# Patient Record
Sex: MALE | NOT HISPANIC OR LATINO | Employment: OTHER | URBAN - NONMETROPOLITAN AREA
[De-identification: names, ages, dates, MRNs, and addresses within clinical notes are randomized per-mention and may not be internally consistent; named-entity substitution may affect disease eponyms.]

---

## 2022-04-04 ENCOUNTER — APPOINTMENT (EMERGENCY)
Dept: RADIOLOGY | Facility: HOSPITAL | Age: 62
End: 2022-04-04
Payer: MEDICARE

## 2022-04-04 ENCOUNTER — HOSPITAL ENCOUNTER (EMERGENCY)
Facility: HOSPITAL | Age: 62
Discharge: HOME | End: 2022-04-04
Attending: EMERGENCY MEDICINE | Admitting: EMERGENCY MEDICINE
Payer: MEDICARE

## 2022-04-04 VITALS
SYSTOLIC BLOOD PRESSURE: 144 MMHG | HEIGHT: 71 IN | HEART RATE: 88 BPM | TEMPERATURE: 97 F | OXYGEN SATURATION: 98 % | WEIGHT: 177.69 LBS | DIASTOLIC BLOOD PRESSURE: 80 MMHG | BODY MASS INDEX: 24.88 KG/M2 | RESPIRATION RATE: 16 BRPM

## 2022-04-04 DIAGNOSIS — S42.212A CLOSED FRACTURE OF NECK OF LEFT HUMERUS, INITIAL ENCOUNTER: Primary | ICD-10-CM
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
## 2022-04-04 PROCEDURE — 99285 EMERGENCY DEPT VISIT HI MDM: CPT | Performed by: EMERGENCY MEDICINE

## 2022-04-04 PROCEDURE — 99284 EMERGENCY DEPT VISIT MOD MDM: CPT

## 2022-04-04 PROCEDURE — 96374 THER/PROPH/DIAG INJ IV PUSH: CPT

## 2022-04-04 PROCEDURE — 73030 X-RAY EXAM OF SHOULDER: CPT

## 2022-04-04 RX ORDER — TAMSULOSIN HYDROCHLORIDE 0.4 MG/1
0.4 CAPSULE ORAL
COMMUNITY

## 2022-04-04 RX ORDER — HYDROCODONE BITARTRATE AND ACETAMINOPHEN 5; 325 MG/1; MG/1
1 TABLET ORAL EVERY 6 HOURS PRN
Qty: 10 TABLET | Refills: 0 | Status: SHIPPED | OUTPATIENT
Start: 2022-04-04 | End: 2022-04-14

## 2022-04-04 RX ORDER — ATORVASTATIN CALCIUM 20 MG/1
20 TABLET, FILM COATED ORAL DAILY
COMMUNITY

## 2022-04-04 RX ORDER — DIAZEPAM 5 MG/1
10 TABLET ORAL EVERY 8 HOURS PRN
COMMUNITY
End: 2022-05-09 | Stop reason: HOSPADM

## 2022-04-04 RX ORDER — HYDROCODONE BITARTRATE AND ACETAMINOPHEN 5; 325 MG/1; MG/1
1 TABLET ORAL ONCE
Status: COMPLETED | OUTPATIENT
Start: 2022-04-04 | End: 2022-04-04

## 2022-04-04 RX ORDER — FENTANYL CITRATE 50 UG/ML
100 INJECTION, SOLUTION INTRAMUSCULAR; INTRAVENOUS ONCE
Status: COMPLETED | OUTPATIENT
Start: 2022-04-04 | End: 2022-04-04

## 2022-04-04 RX ORDER — DULOXETIN HYDROCHLORIDE 60 MG/1
60 CAPSULE, DELAYED RELEASE ORAL DAILY
COMMUNITY

## 2022-04-04 RX ADMIN — FENTANYL CITRATE 100 MCG: 0.05 INJECTION, SOLUTION INTRAMUSCULAR; INTRAVENOUS at 09:33

## 2022-04-04 RX ADMIN — HYDROCODONE BITARTRATE AND ACETAMINOPHEN 1 TABLET: 5; 325 TABLET ORAL at 11:03

## 2022-04-04 NOTE — CASE MANAGEMENT
Case Management Assessment & Discharge Planning Note    Patient name Yaya Alatorre  Location ED 02/ED 02 MRN [id number]  : 1960 Date 2022       Current Admission Date: 2022  Current Admission Diagnosis:Shoulder injury   There are no problems to display for this patient  LOS (days): 0  Geometric Mean LOS (GMLOS) (days):   Days to GMLOS:     OBJECTIVE:              Current admission status: Emergency  Referral Reason:  (dc needs)    Preferred Pharmacy:   [facility], [address] [address]  Phone: [phone] Fax: [phone]    Primary Care Provider: No primary care provider on file  Primary Insurance: MEDICARE  Secondary Insurance: [address]    ASSESSMENT:  [address] Proxies    There are no active Health Care Proxies on file  Patient Information  Mental Status: Alert  During Assessment patient was accompanied by:  Son  Assessment information provided by[de-identified] Spouse,Son  Primary Caregiver: Self  Support Systems: Son  [place] of Residence: [address] [place] [address] do you live in?: [place]  Type of Current Residence: Apartment  Upon entering residence, is there a bedroom on the main floor (no further steps)?: No  Upon entering residence, is there a bathroom on the main floor (no further steps)?: No  In the last 12 months, was there a time when you were not able to pay the mortgage or rent on time?: No  In the last 12 months, how many places have you lived?: 1  In the last 12 months, was there a time when you did not have a steady place to sleep or slept in a shelter (including now)?: No  Homeless/housing insecurity resource given?: Refused  Living Arrangements: Lives Alone  Is patient a ?: No    Activities of Daily Living Prior to Admission  Functional Status: Independent  Completes ADLs independently?: Yes  Ambulates independently?: Yes  Does patient use assisted devices?: Yes  Assisted Devices (DME) used: Lilyan Cea  Does patient have a history of Outpatient Therapy (PT/OT)?: No  Does the patient have a history of Short-Term Rehab?: No  Does patient have a history of HHC?: No  Does patient currently have [address]?: No         Patient Information Continued  Income Source: Government aid  Does patient have prescription coverage?: No  Within the past 12 months, the food you bought just didnt last and you didnt have money to get more : Never true  Food insecurity resource given?: No  Does patient receive dialysis treatments?: No  Does patient have a history of substance abuse?: No (pain addiction)  Does patient have a history of Mental Health Diagnosis?: Yes  Is patient receiving treatment for mental health?: Yes (Sees Dr Fallon Fong)         Means of [place] [employer] of Transport to Appts[de-identified] Family transport  In the past 12 months, has lack of transportation kept you from medical appointments or from getting medications?: No  In the past 12 months, has lack of transportation kept you from meetings, work, or from getting things needed for daily living?: No        DISCHARGE DETAILS:    Discharge planning discussed with[de-identified] patient and son  Freedom of Choice: Yes     CM contacted family/caregiver?: Yes  Were Treatment Team discharge recommendations reviewed with patient/caregiver?: Yes          Contacts  Patient Contacts: Lubna Collazo  Relationship to Patient[de-identified] Family  Contact Method: Phone  Phone Number: [phone]  Reason/Outcome: Discharge Planning,Continuity of [address]         Is the patient interested in [address] at discharge?: No         Other Referral/Resources/Interventions Provided:  Interventions: PCP             Met with son and patient: Pt and son shared that patient has a significant MH hx and seeks care with Dr Fallon Fong  Pt lives alone, per pt had 30+ surgeries  Son is worried about pt living alone, however patient does not think he has an issue living alone  Per son he is forgetful and has been falling  Identified pt does not have a PCP  Pt and son in agreement to see a new PCP in the [address] [place] [address]- PCP appointment was made  Pt is in agreement for for [address] , however must seek a PCP 1st   - PCP appointment was made for 4 PM tomorrow with MD Berg  (information on AVS)    Son is going to go with patient to the PCP office and will request PT/OT and  for his father  Encourage son to call the Office of Aging in [place] for Senior Assessment- Son has tried this however patient declines this  Pt and son were interested in POA/ Advance Directive paperwork, this was provided  Pt needs PT/OT evaluation as he resides alone, uses a cane and now with lt humerus fracture  CM spoke to him about this and he is declining as he said he is Only going home and will not go to a facility    Again agreeable for [address],     Son appreciated information

## 2022-04-04 NOTE — ED NOTES
Patient discharged to home  Verbalized understanding of discharge instructions and need for follow up care  IV dc'd intact         Bruce Cook RN  04/04/22 7559

## 2022-04-04 NOTE — ED PROVIDER NOTES
History  Chief Complaint   Patient presents with    Shoulder Injury     Pt tripped on carpetin his apt,landing on his left shoulder just PTA  Denies head strike, no LOC     Patient is a 45-year-old male presents emergency department due to injury of the left shoulder he was walking in his house and tripped over a rug and fell onto his left shoulder denies any strike on head or loss of consciousness no anticoagulants no other injury complains of pain and deformity in the left shoulder with decreased range of motion  No neck pain no other injury  History provided by:  Patient and EMS personnel  Fall  Mechanism of injury: fall    Injury location:  Shoulder/arm  Shoulder/arm injury location:  L shoulder  Time since incident:  1 hour  Arrived directly from scene: yes    Fall:     Fall occurred:  Walking and tripped  Suspicion of alcohol use: no    Suspicion of drug use: no    Prior to arrival data:     Responsiveness at scene:  Alert    Orientation at scene:  Person, place, time and situation    Loss of consciousness: no      Amnesic to event: no    Associated symptoms: no abdominal pain, no chest pain, no headaches, no nausea and no vomiting        Prior to Admission Medications   Prescriptions Last Dose Informant Patient Reported? Taking?    DULoxetine (CYMBALTA) 60 mg delayed release capsule   Yes Yes   Sig: Take 120 mg by mouth daily   atorvastatin (LIPITOR) 20 mg tablet   Yes Yes   Sig: Take 20 mg by mouth daily   diazepam (VALIUM) 5 mg tablet   Yes Yes   Sig: Take 5 mg by mouth every 8 (eight) hours as needed for anxiety   lurasidone (Latuda) 40 mg tablet   Yes Yes   Sig: Take 20 mg by mouth daily at bedtime   tamsulosin (Flomax) 0 4 mg   Yes Yes   Sig: Take 0 4 mg by mouth daily with dinner      Facility-Administered Medications: None       Past Medical History:   Diagnosis Date    Narcotic dependence ([facility] [address] )        Past Surgical History:   Procedure Laterality Date    BACK SURGERY      JOINT REPLACEMENT Bilateral        History reviewed  No pertinent family history  I have reviewed and agree with the history as documented  E-Cigarette/Vaping    E-Cigarette Use Never User      E-Cigarette/Vaping Substances     Social History     Tobacco Use    Smoking status: Current Every Day Smoker    Smokeless tobacco: Never Used   Vaping Use    Vaping Use: Never used   Substance Use Topics    Alcohol use: Never    Drug use: Never       Review of Systems   Constitutional: Negative for activity change, appetite change, chills, fatigue and fever  HENT: Negative for congestion, ear pain, rhinorrhea and sore throat  Eyes: Negative for discharge, redness and visual disturbance  Respiratory: Negative for cough, chest tightness, shortness of breath and wheezing  Cardiovascular: Negative for chest pain and palpitations  Gastrointestinal: Negative for abdominal pain, constipation, diarrhea, nausea and vomiting  Endocrine: Negative for polydipsia and polyuria  Genitourinary: Negative for difficulty urinating, dysuria, frequency, hematuria and urgency  Musculoskeletal: Negative for arthralgias and myalgias  Left shoulder pain and decreased range of motion   Skin: Negative for color change, pallor and rash  Neurological: Negative for dizziness, weakness, light-headedness, numbness and headaches  Hematological: Negative for adenopathy  Does not bruise/bleed easily  All other systems reviewed and are negative  Physical Exam  Physical Exam  Vitals and nursing note reviewed  Constitutional:       Appearance: He is well-developed  HENT:      Head: Normocephalic and atraumatic  Right Ear: External ear normal       Left Ear: External ear normal       Nose: Nose normal    Eyes:      Conjunctiva/sclera: Conjunctivae normal       Pupils: Pupils are equal, round, and reactive to light  Cardiovascular:      Rate and Rhythm: Normal rate and regular rhythm  Heart sounds: Normal heart sounds  Pulmonary:      Effort: Pulmonary effort is normal  No respiratory distress  Breath sounds: Normal breath sounds  No wheezing or rales  Chest:      Chest wall: No tenderness  Abdominal:      General: Bowel sounds are normal  There is no distension  Palpations: Abdomen is soft  Tenderness: There is no abdominal tenderness  There is no guarding  Musculoskeletal:      Left shoulder: Deformity, tenderness and bony tenderness present  No crepitus  Decreased range of motion  Normal pulse  Cervical back: Normal range of motion and neck supple  Skin:     General: Skin is warm and dry  Neurological:      Mental Status: He is alert and oriented to person, place, and time  Cranial Nerves: No cranial nerve deficit  Sensory: No sensory deficit  Vital Signs  ED Triage Vitals   Temperature Pulse Respirations Blood Pressure SpO2   04/04/22 0853 04/04/22 0853 04/04/22 0853 04/04/22 0853 04/04/22 0853   (!) 97 3 °F (36 3 °C) 99 20 136/95 97 %      Temp Source Heart Rate Source Patient Position - Orthostatic VS BP Location FiO2 (%)   04/04/22 0853 04/04/22 0853 -- 04/04/22 0853 --   Temporal Monitor  Right arm       Pain Score       04/04/22 0933       10 - Worst Possible Pain           Vitals:    04/04/22 0853 04/04/22 0900 04/04/22 1030 04/04/22 1100   BP: 136/95 136/95 148/98 144/80   Pulse: 99 86 99 88         Visual Acuity      ED Medications  Medications   fentanyl citrate (PF) 100 MCG/2ML 100 mcg (100 mcg Intravenous Given 4/4/22 0933)   HYDROcodone-acetaminophen (NORCO) 5-325 mg per tablet 1 tablet (1 tablet Oral Given 4/4/22 1103)       Diagnostic Studies  Results Reviewed     None                 XR shoulder 2+ views LEFT   ED Interpretation by Ashley Julien DO (04/04 4719)   Displaced fracture of neck of the left humerus      Final Result by Karla Mera MD (04/04 1010)      Comminuted, displaced fracture of the proximal left humeral shaft        Workstation performed: [id number]                    Procedures  Procedures         ED Course  ED Course as of 04/04/22 1124   Mon Apr 04, 2022   6411 Spoke with Dr Tremayne Dutta orthopedics on-call reviewed case and findings in the emergency department he viewed x-ray images advised placing the patient in a shoulder immobilizer and having the patient follow-up in the office as an outpatient for further evaluation management for shoulder fracture  04 00 14 32 96 Patient refused to be seen or evaluated by Physical therapy and Occupational therapy in the ED he does not want to go to rehab  There are no grounds for involuntary commitment present on my evaluation the ED the patient reports that he lives at home and he is able to care for himself and feels safe at home he denies any suicidal or homicidal ideation the patient is alert oriented of sound mind and acting and behaving appropriately in the ED  Son was offered to be put in place with crisis for possible involuntary commitment the son feels that there are no grounds for involuntary commitment and myself and case management are also in agreement with this as he was already told this by the patient's psychiatrist      1050 At the son's request case management did become involved with the patient's care and was able to arrange some home care services  SBIRT 20yo+      Most Recent Value   SBIRT (22 yo +)    In order to provide better care to our patients, we are screening all of our patients for alcohol and drug use  Would it be okay to ask you these screening questions? Yes Filed at: 04/04/2022 0900   Initial Alcohol Screen: US AUDIT-C     1  How often do you have a drink containing alcohol? 0 Filed at: 04/04/2022 0900   2  How many drinks containing alcohol do you have on a typical day you are drinking? 0 Filed at: 04/04/2022 0900   3a  Male UNDER 65: How often do you have five or more drinks on one occasion?  0 Filed at: 04/04/2022 0900   Audit-C Score 0 Filed at: 04/04/2022 0900   YSABEL: How many times in the past year have you    Used an illegal drug or used a prescription medication for non-medical reasons? Never Filed at: 04/04/2022 0900                    MDM  Number of Diagnoses or Management Options  Closed fracture of neck of left humerus, initial encounter: new and requires workup  Fall, initial encounter: new and requires workup  Diagnosis management comments: Patient remained clinically and hemodynamically stable in the emergency department he is neurovascularly intact distal to injury placed in shoulder immobilizer for shoulder fracture advised supportive care no use of the left arm for now and follow-up with Orthopedics for further evaluation and treatment patient also provided with PCP appointment for tomorrow to establish care and get assistance with home care services at the son's request case management was involved with son and patient in the ED and discussed his care son had many concerns about the patient's psychiatric care the patient does admit to seeing a psychiatrist feels that he is not in need of inpatient psychiatric treatment at this time there are no grounds for involuntary commitment the patient denies any suicidal thoughts or thoughts of self-harm or thoughts of harm to others and the patient reports that he is caring for himself well at home and has no concerns about his own safety at home although the son does have such concerns he was offered advised that he could talk with [facility] if he was concern for his father's safety from a psychiatric standpoint the son declined to do so at this time and states that he was already told that his father does not be grounds for involuntary commitment  Patient will follow up promptly with Orthopedics and PCP as advised return precautions and anticipatory guidance discussed           Amount and/or Complexity of Data Reviewed  Tests in the radiology section of CPT®: ordered and reviewed  Decide to obtain previous medical records or to obtain history from someone other than the patient: yes  Review and summarize past medical records: yes  Independent visualization of images, tracings, or specimens: yes    Risk of Complications, Morbidity, and/or Mortality  Presenting problems: moderate  Diagnostic procedures: moderate  Management options: moderate        Disposition  Final diagnoses:   Closed fracture of neck of left humerus, initial encounter - Displaced   Fall, initial encounter     Time reflects when diagnosis was documented in both MDM as applicable and the Disposition within this note     Time User Action Codes Description Comment    4/4/2022  9:42 AM Alvena Rota Add [S42 212A] Closed fracture of neck of left humerus, initial encounter     4/4/2022  9:42 AM Alvena Rota Modify [S42 212A] Closed fracture of neck of left humerus, initial encounter Displaced    4/4/2022 10:59 AM Alvena Rota Add Lavell Romojohn [place], initial encounter       ED Disposition     ED Disposition Condition Date/Time Comment    Discharge Stable Mon Apr 4, 2022  9:42 AM Steven Wilkins discharge to home/self care              Follow-up Information     Follow up With Specialties Details Why Contact Info Additional Information    [address] Orthopedic Surgery Schedule an appointment as soon as possible for a visit in 3 days  [facility] [address] [address] [address], Entrance A, 1st Floor, [facility]    Maya Durbin MD Internal Medicine Follow up on 4/5/2022 Appointment at 4 PM-- Please request [facility]  [address] [facility] [address] [phone]             Patient's Medications   Discharge Prescriptions    HYDROCODONE-ACETAMINOPHEN (NORCO) 5-325 MG PER TABLET    Take 1 tablet by mouth every 6 (six) hours as needed for pain for up to 10 days Max Daily Amount: 4 tablets       Start Date: 4/4/2022  End Date: 4/14/2022       Order Dose: 1 tablet       Quantity: 10 tablet    Refills: 0           PDMP Review     None          ED Provider  Electronically Signed by           Mikaela Lam DO  04/04/22 1124

## 2022-04-05 ENCOUNTER — OFFICE VISIT (OUTPATIENT)
Dept: FAMILY MEDICINE CLINIC | Facility: CLINIC | Age: 62
End: 2022-04-05
Payer: MEDICARE

## 2022-04-05 VITALS
SYSTOLIC BLOOD PRESSURE: 124 MMHG | DIASTOLIC BLOOD PRESSURE: 80 MMHG | WEIGHT: 174 LBS | HEART RATE: 102 BPM | HEIGHT: 72 IN | TEMPERATURE: 93.5 F | BODY MASS INDEX: 23.57 KG/M2 | OXYGEN SATURATION: 98 %

## 2022-04-05 DIAGNOSIS — E66.09 CLASS 1 OBESITY DUE TO EXCESS CALORIES WITHOUT SERIOUS COMORBIDITY WITH BODY MASS INDEX (BMI) OF 30.0 TO 30.9 IN ADULT: ICD-10-CM

## 2022-04-05 DIAGNOSIS — R41.3 MEMORY LOSS: ICD-10-CM

## 2022-04-05 DIAGNOSIS — S42.292D CLOSED FRACTURE OF HEAD OF LEFT HUMERUS WITH ROUTINE HEALING, SUBSEQUENT ENCOUNTER: ICD-10-CM

## 2022-04-05 DIAGNOSIS — Z12.11 ENCOUNTER FOR SCREENING COLONOSCOPY: Primary | ICD-10-CM

## 2022-04-05 DIAGNOSIS — E78.49 OTHER HYPERLIPIDEMIA: ICD-10-CM

## 2022-04-05 PROBLEM — S42.292A CLOSED FRACTURE OF HEAD OF LEFT HUMERUS: Status: ACTIVE | Noted: 2022-04-05

## 2022-04-05 PROCEDURE — 99203 OFFICE O/P NEW LOW 30 MIN: CPT | Performed by: INTERNAL MEDICINE

## 2022-04-05 RX ORDER — TRAZODONE HYDROCHLORIDE 150 MG/1
100 TABLET ORAL
COMMUNITY
Start: 2022-03-09

## 2022-04-05 NOTE — PROGRESS NOTES
Assessment/Plan:    Memory loss  This may be pseudodementia related to his ongoing depression  I would encourage him to follow up with his psychiatrist as he is already initiated a workup  I do not have access to those results yet  Other hyperlipidemia  On Lipitor  Once he is done with the fracture care, we can set him up for a lipid panel  Closed fracture of head of left humerus  Follow up with Ortho as scheduled tomorrow  Chief Complaint     Establish Care; Follow-up; home health          Patient ID: Joni Hauser is a 63 y o  male who returns for ER follow-up follow up  He fell yesterday and suffered a displaced fracture of the neck of the left humerus and there is also a comminuted displaced fracture of the proximal left humeral shaft  Care was coordinated with Orthopedics on-call and it was recommended that he be placed in an immobilizer to follow-up with orthopedics as an outpatient  He declined evaluation by PT and OT and was not interested in going to rehab  He has chronic pain and has had spinal fusion and multiple knee replacements  He is here today with his son who has concerns about assuring that his father gets assistance at home  The son is also concerned about his father's memory and shared that his father sometimes calls to ask a question and then calls back to ask the same question  It seems to have been ongoing for a this past couple of years but seems to be getting worse over the past few months  He saw his psychiatrist, Dr Wyatt Elmore last week who ordered lab work and a CT scan of the brain  He does have a history of opiate dependence and has been on suboxone in the past under the direction of the Addiction Medicine Service at [facility]        Objective:    /80 (BP Location: Right arm, Patient Position: Sitting, Cuff Size: Standard)   Pulse 102   Temp (!) 93 5 °F (34 2 °C)   Ht 6' (1 829 m)   Wt 78 9 kg (174 lb)   SpO2 98%   BMI 23 60 kg/m²     Wt Readings from Last 3 Encounters:   04/05/22 78 9 kg (174 lb)   04/04/22 80 6 kg (177 lb 11 1 oz)         Physical Exam    General:  Well-developed, well-nourished, in no acute distress  Musculoskeletal:  Left shoulder is in a sling  Neuro: April 5, 2022, Tuesday, Spring, 1st floor  DLROW  No ifs, ands or buts  [place] correct interlocking pentagons correctly  Wrote grammatically correct sentence

## 2022-04-05 NOTE — ASSESSMENT & PLAN NOTE
This may be pseudodementia related to his ongoing depression  I would encourage him to follow up with his psychiatrist as he is already initiated a workup  I do not have access to those results yet

## 2022-04-07 ENCOUNTER — TELEPHONE (OUTPATIENT)
Dept: FAMILY MEDICINE CLINIC | Facility: CLINIC | Age: 62
End: 2022-04-07

## 2022-04-07 NOTE — TELEPHONE ENCOUNTER
I started the process of getting Emery Horton set up with home health services  [place] will be calling our office to let us know what exactly they would need from us to fax over to them  Please clarify on what type of services this patient needs- such as  PT/OT?

## 2022-04-08 ENCOUNTER — TELEPHONE (OUTPATIENT)
Dept: FAMILY MEDICINE CLINIC | Facility: CLINIC | Age: 62
End: 2022-04-08

## 2022-04-08 NOTE — TELEPHONE ENCOUNTER
Arslan from [address] informed us today that she attempted to contact [facility] to set up a visit this afternoon, however, [facility] was refusing to see them  [facility] stated he does not have a doctor yet to help with his injury and he would think about calling them back sometime next week  She expressed concerns about his safety and how he is a high fall risk  I gave Kristina Min contact information for Onemahesha Tammy son, Mane Greenlandtor Becerril was very adamant about setting home health care up  Kristina Min is going to be reaching out to Mane Becerril today to hopefully schedule a visit for this afternoon

## 2022-04-08 NOTE — TELEPHONE ENCOUNTER
Arslan from [facility] called again with an update  She was able to speak to [facility] and they came to an agreement to have the initial visit for Monday, April 11th  She states since this outside of the window of 48 hour initial visit, the referral will need to be updated and sent over again   She states that it must be a brand new referral

## 2022-04-13 ENCOUNTER — HOSPITAL ENCOUNTER (OUTPATIENT)
Dept: RADIOLOGY | Facility: CLINIC | Age: 62
Discharge: HOME | End: 2022-04-13
Payer: MEDICARE

## 2022-04-13 VITALS
HEIGHT: 72 IN | HEART RATE: 82 BPM | SYSTOLIC BLOOD PRESSURE: 140 MMHG | TEMPERATURE: 97.8 F | DIASTOLIC BLOOD PRESSURE: 98 MMHG | BODY MASS INDEX: 23.57 KG/M2 | WEIGHT: 174 LBS

## 2022-04-13 DIAGNOSIS — S42.222A CLOSED 2-PART DISPLACED FRACTURE OF SURGICAL NECK OF LEFT HUMERUS, INITIAL ENCOUNTER: ICD-10-CM

## 2022-04-13 DIAGNOSIS — M25.512 ACUTE PAIN OF LEFT SHOULDER: Primary | ICD-10-CM

## 2022-04-13 DIAGNOSIS — S42.212A CLOSED FRACTURE OF NECK OF LEFT HUMERUS, INITIAL ENCOUNTER: ICD-10-CM

## 2022-04-13 PROCEDURE — 99204 OFFICE O/P NEW MOD 45 MIN: CPT | Performed by: ORTHOPAEDIC SURGERY

## 2022-04-13 PROCEDURE — 23600 CLTX PROX HUMRL FX W/O MNPJ: CPT | Performed by: ORTHOPAEDIC SURGERY

## 2022-04-13 PROCEDURE — 73030 X-RAY EXAM OF SHOULDER: CPT

## 2022-04-13 RX ORDER — BUPRENORPHINE AND NALOXONE 8; 2 MG/1; MG/1
8 FILM, SOLUBLE BUCCAL; SUBLINGUAL 3 TIMES DAILY
COMMUNITY
Start: 2022-04-11 | End: 2022-05-09 | Stop reason: HOSPADM

## 2022-04-13 NOTE — PROGRESS NOTES
ASSESSMENT/PLAN:    Diagnoses and all orders for this visit:    Acute pain of left shoulder    Closed 2-part displaced fracture of surgical neck of left humerus, initial encounter  Comments:  Displaced  Orders:  -     Ambulatory Referral to Orthopedic Surgery  -     XR shoulder 2+ vw left; Future    Other orders  -     buprenorphine-naloxone (Suboxone) 8-2 mg        Plan:  I discussed his treatment options including both surgical and nonsurgical options  He has no interest in surgical intervention at this time due to the fact that he has had numerous surgical interventions in the past and is actually anticipating additional knee replacement surgeries  He does understand that the fracture will remain displaced and this will likely impede function although he should be able to perform routine daily living switch is his goal   I will see him in 10-14 days for repeat x-rays  At this time, he may begin pendulum exercises and gentle use of the left upper extremity with no lifting more than 1 lb  He is to contact me if questions or concerns arise  I would anticipate initiating physical therapy at the time of his follow-up in 10-14 days  Return for 10-14 days  _____________________________________________________  CHIEF COMPLAINT:  Chief Complaint   Patient presents with    Left Arm - Fracture         SUBJECTIVE:  Paco Bryant is a 63y o  year old right hand on a male who presents for evaluation of his left shoulder injured on 04/04/2022  He states he has chronic bilateral knee problems and was getting up from a seated position when his knee buckled, he fell landing on his left shoulder  He was seen in the emergency room at [address] [place] [address], x-rays were obtained and he was provided with a sling  He now presents for orthopedic evaluation and treatment  He denies any additional injuries of significance  He denies left upper extremity paresthesias    He notes a minimal improvement in symptoms over the last 9 days  He notes swelling in the left upper extremity  He notes that the sling tends to be inconvenient and tends to fall off  He was seen by his primary care physician and states that home physical therapy has been arranged  PAST MEDICAL HISTORY:  Past Medical History:   Diagnosis Date    Narcotic dependence ([facility] [address] )        PAST SURGICAL HISTORY:  Past Surgical History:   Procedure Laterality Date    JOINT REPLACEMENT Bilateral     SPINAL FUSION         FAMILY HISTORY:  History reviewed  No pertinent family history  SOCIAL HISTORY:  Social History     Tobacco Use    Smoking status: Current Every Day Smoker     Packs/day: 0 25     Years: 20 00     Pack years: 5 00     Types: Cigarettes    Smokeless tobacco: Never Used   Vaping Use    Vaping Use: Never used   Substance Use Topics    Alcohol use: Never    Drug use: Never       MEDICATIONS:    Current Outpatient Medications:     atorvastatin (LIPITOR) 20 mg tablet, Take 20 mg by mouth daily, Disp: , Rfl:     buprenorphine-naloxone (Suboxone) 8-2 mg, , Disp: , Rfl:     diazepam (VALIUM) 5 mg tablet, Take 5 mg by mouth every 8 (eight) hours as needed for anxiety, Disp: , Rfl:     DULoxetine (CYMBALTA) 60 mg delayed release capsule, Take 120 mg by mouth daily, Disp: , Rfl:     lurasidone (Latuda) 40 mg tablet, Take 20 mg by mouth daily at bedtime, Disp: , Rfl:     tamsulosin (Flomax) 0 4 mg, Take 0 4 mg by mouth daily with dinner, Disp: , Rfl:     traZODone (DESYREL) 150 mg tablet, Take 150 mg by mouth daily at bedtime , Disp: , Rfl:     HYDROcodone-acetaminophen (NORCO) 5-325 mg per tablet, Take 1 tablet by mouth every 6 (six) hours as needed for pain for up to 10 days Max Daily Amount: 4 tablets (Patient not taking: Reported on 4/13/2022 ), Disp: 10 tablet, Rfl: 0    ALLERGIES:  No Known Allergies    Review of systems:   Constitutional: Negative for fatigue, fever or loss of apetite  HENT: Negative      Respiratory: Negative for shortness of breath, dyspnea  Cardiovascular: Negative for chest pain/tightness  Gastrointestinal: Negative for abdominal pain, N/V  Endocrine: Negative for cold/heat intolerance, unexplained weight loss/gain  Genitourinary: Negative for flank pain, dysuria, hematuria  Musculoskeletal:  Positive as in the HPI   Skin: Negative for rash  Neurological:  Negative  Psychiatric/Behavioral: Negative for agitation  _____________________________________________________  PHYSICAL EXAMINATION:    Blood pressure 140/98, pulse 82, temperature 97 8 °F (36 6 °C), temperature source Temporal, height 6' (1 829 m), weight 78 9 kg (174 lb)  General: well developed and well nourished, alert, oriented times 3 and appears comfortable  Psychiatric: Normal  HEENT: Benign  Cardiovascular: Regular    Pulmonary: No wheezing or stridor  Abdomen: Soft, Nontender  Skin: No masses, erythema, lacerations, fluctation, ulcerations  Neurovascular: Motor and sensory exams are grossly intact except as limited by his injury  Pulses are palpable  MUSCULOSKELETAL EXAMINATION:    The left upper extremity exam demonstrates significant swelling and ecchymosis from the shoulder distally to the hand and wrist   He was able to actively rotate the forearm without restriction, dorsiflex and volar flex the wrist without restriction, flex and extend the fingers without restriction and denies any pain  He did complain of some elbow and shoulder pain with elbow range of motion but had full extension and flexion to 120°  Shoulder range of motion is notably limited with complaints of pain during active and passive motion  Has tenderness to palpation of the proximal left humerus  There is mild deformity appreciated on physical inspection and palpation of the shoulder  The remainder of the upper extremity examination bilaterally is benign        _____________________________________________________  STUDIES REVIEWED:  X-rays dated 04/04/2022 demonstrated a displaced fracture of the surgical neck/proximal humeral shaft with 100% translation of the shaft medially  X-rays obtained today including AP and transthoracic images demonstrate continued displacement on the AP image but no evidence of anterior or posterior translation on the transthoracic image  There is no evidence of significant healing noted on the most recent x-rays  The x-ray reports from 04/04/2022 was reviewed  The ER note was reviewed  PROCEDURES:  Fracture / Dislocation Treatment    Date/Time: 4/13/2022 10:59 AM  Performed by: My Chanel  Authorized by: My Chanel     Patient Location:  Clinic  Verbal consent obtained?: Yes    Written consent obtained?: No    Risks and benefits: Risks, benefits and alternatives were discussed    Consent given by:  Patient  Patient states understanding of procedure being performed: Yes    Test results available and properly labeled: Yes    Radiology Images displayed and confirmed   If images not available, report reviewed: Yes    Injury location:  Shoulder  Location details:  Left shoulder  Injury type:  Fracture  Fracture type: surgical neck    Neurovascular status: Neurovascularly intact    Local anesthesia used?: No    Manipulation performed?: No    Immobilization:  Sling  Patient tolerance:  Patient tolerated the procedure well with no immediate complications          My Chanel

## 2022-04-14 ENCOUNTER — TELEPHONE (OUTPATIENT)
Dept: OBGYN CLINIC | Facility: CLINIC | Age: 62
End: 2022-04-14

## 2022-04-14 NOTE — TELEPHONE ENCOUNTER
Spoke to Blair and advised of above  She stated she will call him back to get him on the schedule if he is willing

## 2022-04-14 NOTE — TELEPHONE ENCOUNTER
Dr Pruitt   RE: Home Health  CB#: [phone]      Mihaela Sizer from PT @ [facility], patient was supposed to see PT but did cancel  He states he was told by ortho to d/c therapy  His pcp had ordered home health due to needing assistant with ADL's , safety, and lower extremity balance concerns  His son is worried about the dad falling  They just want to get the okay from Dr Pruitt to continue this , they are not working on his shoulder      She would like a call back from clinical

## 2022-04-25 ENCOUNTER — HOSPITAL ENCOUNTER (INPATIENT)
Facility: HOSPITAL | Age: 62
LOS: 14 days | Discharge: HOME | DRG: 091 | End: 2022-05-09
Attending: EMERGENCY MEDICINE | Admitting: FAMILY MEDICINE
Payer: MEDICARE

## 2022-04-25 ENCOUNTER — APPOINTMENT (EMERGENCY)
Dept: RADIOLOGY | Facility: HOSPITAL | Age: 62
DRG: 091 | End: 2022-04-25
Payer: MEDICARE

## 2022-04-25 ENCOUNTER — APPOINTMENT (EMERGENCY)
Dept: CT IMAGING | Facility: HOSPITAL | Age: 62
DRG: 091 | End: 2022-04-25
Payer: MEDICARE

## 2022-04-25 DIAGNOSIS — S42.302A LEFT HUMERAL FRACTURE: ICD-10-CM

## 2022-04-25 DIAGNOSIS — S42.292D CLOSED FRACTURE OF HEAD OF LEFT HUMERUS WITH ROUTINE HEALING, SUBSEQUENT ENCOUNTER: ICD-10-CM

## 2022-04-25 DIAGNOSIS — R41.89 IMPAIRED COGNITIVE ABILITY: ICD-10-CM

## 2022-04-25 DIAGNOSIS — M54.50 CHRONIC BILATERAL LOW BACK PAIN WITHOUT SCIATICA: ICD-10-CM

## 2022-04-25 DIAGNOSIS — R60.0 EDEMA OF LEFT UPPER ARM: ICD-10-CM

## 2022-04-25 DIAGNOSIS — F31.9 BIPOLAR AFFECTIVE DISORDER, REMISSION STATUS UNSPECIFIED (HCC): ICD-10-CM

## 2022-04-25 DIAGNOSIS — L89.153 SACRAL DECUBITUS ULCER, STAGE III (HCC): ICD-10-CM

## 2022-04-25 DIAGNOSIS — S22.42XD CLOSED FRACTURE OF MULTIPLE RIBS OF LEFT SIDE WITH ROUTINE HEALING: ICD-10-CM

## 2022-04-25 DIAGNOSIS — T07.XXXA MULTIPLE TRAUMA: ICD-10-CM

## 2022-04-25 DIAGNOSIS — S22.49XA RIB FRACTURES: ICD-10-CM

## 2022-04-25 DIAGNOSIS — R33.9 URINARY RETENTION: ICD-10-CM

## 2022-04-25 DIAGNOSIS — E78.49 OTHER HYPERLIPIDEMIA: ICD-10-CM

## 2022-04-25 DIAGNOSIS — G89.29 CHRONIC BILATERAL LOW BACK PAIN WITHOUT SCIATICA: ICD-10-CM

## 2022-04-25 DIAGNOSIS — R41.0 CONFUSION: Primary | ICD-10-CM

## 2022-04-25 DIAGNOSIS — E23.6 EMPTY SELLA TURCICA (HCC): ICD-10-CM

## 2022-04-25 DIAGNOSIS — S42.309A HUMERUS FRACTURE: ICD-10-CM

## 2022-04-25 DIAGNOSIS — G93.41 ACUTE METABOLIC ENCEPHALOPATHY: ICD-10-CM

## 2022-04-25 DIAGNOSIS — F41.9 ANXIETY: ICD-10-CM

## 2022-04-25 DIAGNOSIS — Z72.0 TOBACCO USE: ICD-10-CM

## 2022-04-25 PROBLEM — M54.9 BACK PAIN: Status: ACTIVE | Noted: 2022-04-25

## 2022-04-25 LAB
2HR DELTA HS TROPONIN: 0 NG/L
ABO GROUP BLD: NORMAL
ABO GROUP BLD: NORMAL
AMPHETAMINES SERPL QL SCN: NEGATIVE
ANION GAP SERPL CALCULATED.3IONS-SCNC: 11 MMOL/L (ref 4–13)
APTT PPP: 34 SECONDS (ref 23–37)
BACTERIA UR QL AUTO: ABNORMAL /HPF
BARBITURATES UR QL: NEGATIVE
BENZODIAZ UR QL: POSITIVE
BILIRUB UR QL STRIP: NEGATIVE
BLD GP AB SCN SERPL QL: NEGATIVE
BUN SERPL-MCNC: 15 MG/DL (ref 5–25)
CALCIUM SERPL-MCNC: 8.5 MG/DL (ref 8.3–10.1)
CARDIAC TROPONIN I PNL SERPL HS: 7 NG/L
CARDIAC TROPONIN I PNL SERPL HS: 7 NG/L
CHLORIDE SERPL-SCNC: 102 MMOL/L (ref 100–108)
CK MB SERPL-MCNC: 1.4 % (ref 0–2.5)
CK MB SERPL-MCNC: 3 NG/ML (ref 0–5)
CK SERPL-CCNC: 213 U/L (ref 39–308)
CLARITY UR: CLEAR
CO2 SERPL-SCNC: 28 MMOL/L (ref 21–32)
COCAINE UR QL: NEGATIVE
COLOR UR: ABNORMAL
CREAT SERPL-MCNC: 0.7 MG/DL (ref 0.6–1.3)
ERYTHROCYTE [DISTWIDTH] IN BLOOD BY AUTOMATED COUNT: 14.6 % (ref 11.6–15.1)
ETHANOL SERPL-MCNC: <3 MG/DL (ref 0–3)
FLUAV RNA RESP QL NAA+PROBE: NEGATIVE
FLUBV RNA RESP QL NAA+PROBE: NEGATIVE
GFR SERPL CREATININE-BSD FRML MDRD: 102 ML/MIN/1.73SQ M
GLUCOSE SERPL-MCNC: 129 MG/DL (ref 65–140)
GLUCOSE SERPL-MCNC: 129 MG/DL (ref 65–140)
GLUCOSE UR STRIP-MCNC: NEGATIVE MG/DL
HCT VFR BLD AUTO: 33.6 % (ref 36.5–49.3)
HGB BLD-MCNC: 11.4 G/DL (ref 12–17)
HGB UR QL STRIP.AUTO: ABNORMAL
HYALINE CASTS #/AREA URNS LPF: ABNORMAL /LPF
INR PPP: 0.98 (ref 0.84–1.19)
KETONES UR STRIP-MCNC: NEGATIVE MG/DL
LACTATE SERPL-SCNC: 0.7 MMOL/L (ref 0.5–2)
LEUKOCYTE ESTERASE UR QL STRIP: NEGATIVE
MCH RBC QN AUTO: 35.2 PG (ref 26.8–34.3)
MCHC RBC AUTO-ENTMCNC: 33.9 G/DL (ref 31.4–37.4)
MCV RBC AUTO: 104 FL (ref 82–98)
METHADONE UR QL: NEGATIVE
MUCOUS THREADS UR QL AUTO: ABNORMAL
NITRITE UR QL STRIP: NEGATIVE
NON-SQ EPI CELLS URNS QL MICRO: ABNORMAL /HPF
OPIATES UR QL SCN: NEGATIVE
OTHER STN SPEC: ABNORMAL
OXYCODONE+OXYMORPHONE UR QL SCN: NEGATIVE
PCP UR QL: NEGATIVE
PH UR STRIP.AUTO: 6 [PH]
PLATELET # BLD AUTO: 355 THOUSANDS/UL (ref 149–390)
PMV BLD AUTO: 9.3 FL (ref 8.9–12.7)
POTASSIUM SERPL-SCNC: 4.2 MMOL/L (ref 3.5–5.3)
PROT UR STRIP-MCNC: NEGATIVE MG/DL
PROTHROMBIN TIME: 12.9 SECONDS (ref 11.6–14.5)
RBC # BLD AUTO: 3.24 MILLION/UL (ref 3.88–5.62)
RBC #/AREA URNS AUTO: ABNORMAL /HPF
RH BLD: POSITIVE
RH BLD: POSITIVE
RSV RNA RESP QL NAA+PROBE: NEGATIVE
SARS-COV-2 RNA RESP QL NAA+PROBE: NEGATIVE
SODIUM SERPL-SCNC: 141 MMOL/L (ref 136–145)
SP GR UR STRIP.AUTO: >=1.03 (ref 1–1.03)
SPECIMEN EXPIRATION DATE: NORMAL
THC UR QL: NEGATIVE
UROBILINOGEN UR QL STRIP.AUTO: 0.2 E.U./DL
VIT B12 SERPL-MCNC: 319 PG/ML (ref 100–900)
WBC # BLD AUTO: 9.02 THOUSAND/UL (ref 4.31–10.16)
WBC #/AREA URNS AUTO: ABNORMAL /HPF

## 2022-04-25 PROCEDURE — 71260 CT THORAX DX C+: CPT

## 2022-04-25 PROCEDURE — 82077 ASSAY SPEC XCP UR&BREATH IA: CPT | Performed by: EMERGENCY MEDICINE

## 2022-04-25 PROCEDURE — 71045 X-RAY EXAM CHEST 1 VIEW: CPT

## 2022-04-25 PROCEDURE — 85610 PROTHROMBIN TIME: CPT | Performed by: EMERGENCY MEDICINE

## 2022-04-25 PROCEDURE — 99285 EMERGENCY DEPT VISIT HI MDM: CPT

## 2022-04-25 PROCEDURE — 73030 X-RAY EXAM OF SHOULDER: CPT

## 2022-04-25 PROCEDURE — 74177 CT ABD & PELVIS W/CONTRAST: CPT

## 2022-04-25 PROCEDURE — 83605 ASSAY OF LACTIC ACID: CPT | Performed by: EMERGENCY MEDICINE

## 2022-04-25 PROCEDURE — 99285 EMERGENCY DEPT VISIT HI MDM: CPT | Performed by: EMERGENCY MEDICINE

## 2022-04-25 PROCEDURE — 82948 REAGENT STRIP/BLOOD GLUCOSE: CPT

## 2022-04-25 PROCEDURE — 82553 CREATINE MB FRACTION: CPT | Performed by: EMERGENCY MEDICINE

## 2022-04-25 PROCEDURE — 72170 X-RAY EXAM OF PELVIS: CPT

## 2022-04-25 PROCEDURE — 70496 CT ANGIOGRAPHY HEAD: CPT

## 2022-04-25 PROCEDURE — 80307 DRUG TEST PRSMV CHEM ANLYZR: CPT | Performed by: EMERGENCY MEDICINE

## 2022-04-25 PROCEDURE — 86850 RBC ANTIBODY SCREEN: CPT | Performed by: EMERGENCY MEDICINE

## 2022-04-25 PROCEDURE — 99223 1ST HOSP IP/OBS HIGH 75: CPT | Performed by: FAMILY MEDICINE

## 2022-04-25 PROCEDURE — 81001 URINALYSIS AUTO W/SCOPE: CPT | Performed by: EMERGENCY MEDICINE

## 2022-04-25 PROCEDURE — 80048 BASIC METABOLIC PNL TOTAL CA: CPT | Performed by: EMERGENCY MEDICINE

## 2022-04-25 PROCEDURE — 87040 BLOOD CULTURE FOR BACTERIA: CPT | Performed by: EMERGENCY MEDICINE

## 2022-04-25 PROCEDURE — 72125 CT NECK SPINE W/O DYE: CPT

## 2022-04-25 PROCEDURE — 36415 COLL VENOUS BLD VENIPUNCTURE: CPT | Performed by: EMERGENCY MEDICINE

## 2022-04-25 PROCEDURE — 86900 BLOOD TYPING SEROLOGIC ABO: CPT | Performed by: EMERGENCY MEDICINE

## 2022-04-25 PROCEDURE — 73564 X-RAY EXAM KNEE 4 OR MORE: CPT

## 2022-04-25 PROCEDURE — 93005 ELECTROCARDIOGRAM TRACING: CPT

## 2022-04-25 PROCEDURE — 82550 ASSAY OF CK (CPK): CPT | Performed by: EMERGENCY MEDICINE

## 2022-04-25 PROCEDURE — 86901 BLOOD TYPING SEROLOGIC RH(D): CPT | Performed by: EMERGENCY MEDICINE

## 2022-04-25 PROCEDURE — 85730 THROMBOPLASTIN TIME PARTIAL: CPT | Performed by: EMERGENCY MEDICINE

## 2022-04-25 PROCEDURE — 0241U HB NFCT DS VIR RESP RNA 4 TRGT: CPT | Performed by: EMERGENCY MEDICINE

## 2022-04-25 PROCEDURE — 51702 INSERT TEMP BLADDER CATH: CPT | Performed by: EMERGENCY MEDICINE

## 2022-04-25 PROCEDURE — 82607 VITAMIN B-12: CPT | Performed by: FAMILY MEDICINE

## 2022-04-25 PROCEDURE — 70498 CT ANGIOGRAPHY NECK: CPT

## 2022-04-25 PROCEDURE — 84484 ASSAY OF TROPONIN QUANT: CPT | Performed by: EMERGENCY MEDICINE

## 2022-04-25 PROCEDURE — 85027 COMPLETE CBC AUTOMATED: CPT | Performed by: EMERGENCY MEDICINE

## 2022-04-25 RX ORDER — ASPIRIN 81 MG/1
81 TABLET, CHEWABLE ORAL DAILY
Status: DISCONTINUED | OUTPATIENT
Start: 2022-04-26 | End: 2022-05-09 | Stop reason: HOSPADM

## 2022-04-25 RX ORDER — TRAZODONE HYDROCHLORIDE 50 MG/1
150 TABLET ORAL
Status: DISCONTINUED | OUTPATIENT
Start: 2022-04-25 | End: 2022-04-25

## 2022-04-25 RX ORDER — SODIUM CHLORIDE 9 MG/ML
100 INJECTION, SOLUTION INTRAVENOUS CONTINUOUS
Status: DISCONTINUED | OUTPATIENT
Start: 2022-04-25 | End: 2022-05-02

## 2022-04-25 RX ORDER — DIAZEPAM 5 MG/1
5 TABLET ORAL EVERY 8 HOURS PRN
Status: DISCONTINUED | OUTPATIENT
Start: 2022-04-25 | End: 2022-05-08

## 2022-04-25 RX ORDER — TAMSULOSIN HYDROCHLORIDE 0.4 MG/1
0.4 CAPSULE ORAL
Status: DISCONTINUED | OUTPATIENT
Start: 2022-04-25 | End: 2022-05-09 | Stop reason: HOSPADM

## 2022-04-25 RX ORDER — ATORVASTATIN CALCIUM 20 MG/1
20 TABLET, FILM COATED ORAL DAILY
Status: DISCONTINUED | OUTPATIENT
Start: 2022-04-26 | End: 2022-04-25

## 2022-04-25 RX ORDER — DULOXETIN HYDROCHLORIDE 60 MG/1
120 CAPSULE, DELAYED RELEASE ORAL DAILY
Status: DISCONTINUED | OUTPATIENT
Start: 2022-04-26 | End: 2022-05-09 | Stop reason: HOSPADM

## 2022-04-25 RX ORDER — ONDANSETRON 2 MG/ML
4 INJECTION INTRAMUSCULAR; INTRAVENOUS EVERY 6 HOURS PRN
Status: DISCONTINUED | OUTPATIENT
Start: 2022-04-25 | End: 2022-05-09 | Stop reason: HOSPADM

## 2022-04-25 RX ORDER — BUPRENORPHINE AND NALOXONE 8; 2 MG/1; MG/1
8 FILM, SOLUBLE BUCCAL; SUBLINGUAL 3 TIMES DAILY
Status: DISCONTINUED | OUTPATIENT
Start: 2022-04-26 | End: 2022-05-08

## 2022-04-25 RX ORDER — BUPRENORPHINE AND NALOXONE 8; 2 MG/1; MG/1
8 FILM, SOLUBLE BUCCAL; SUBLINGUAL 3 TIMES DAILY
Status: DISCONTINUED | OUTPATIENT
Start: 2022-04-25 | End: 2022-04-25

## 2022-04-25 RX ORDER — ATORVASTATIN CALCIUM 40 MG/1
40 TABLET, FILM COATED ORAL EVERY EVENING
Status: DISCONTINUED | OUTPATIENT
Start: 2022-04-25 | End: 2022-05-09 | Stop reason: HOSPADM

## 2022-04-25 RX ORDER — CALCIUM CARBONATE 200(500)MG
1000 TABLET,CHEWABLE ORAL DAILY PRN
Status: DISCONTINUED | OUTPATIENT
Start: 2022-04-25 | End: 2022-05-09 | Stop reason: HOSPADM

## 2022-04-25 RX ORDER — ACETAMINOPHEN 325 MG/1
650 TABLET ORAL EVERY 6 HOURS PRN
Status: DISCONTINUED | OUTPATIENT
Start: 2022-04-25 | End: 2022-05-08

## 2022-04-25 RX ADMIN — IOHEXOL 100 ML: 350 INJECTION, SOLUTION INTRAVENOUS at 11:00

## 2022-04-25 RX ADMIN — SODIUM CHLORIDE 100 ML/HR: 0.9 INJECTION, SOLUTION INTRAVENOUS at 18:16

## 2022-04-25 NOTE — ASSESSMENT & PLAN NOTE
Patient had left humeral fracture diagnosed on 04/04/2022 on an ED visit  Still present on imaging  Will ask Orthopedics for evaluation    Place left arm in the sling and ask PT OT for evaluation

## 2022-04-25 NOTE — ASSESSMENT & PLAN NOTE
Patient noted to have worsening confusion, yelling for help,  Found on the floor by neighbors  Found to be incontinent of stool  Currently in the ED he is having episodes of somnolence however is arousable for few minutes and then he drifts back to sleep  Urine drug screen is positive for benzos  Patient is chronically on Valium 10 mg 3 times daily and also on Suboxone 8/2 mg 1 sublingual film 3 times daily  Unclear whether he was abusing medications are withdrawing from medications  CT brain and CTa head and neck reviewed  No acute pathology noted  Continue neuro checks for now   Resume home medications from tomorrow unless he becomes more awake later tonight and wants to take his Valium and Suboxone and that would be okay  Differential at this time includes drug withdrawal versus intoxication, seizure, less likely stroke  Will do MRI brain  Discussed with Neurology

## 2022-04-25 NOTE — ED PROVIDER NOTES
History  Chief Complaint   Patient presents with    Fall     Pt found on floor by neighbors in his home  Pt was yelling for help   Unknown LOC     53-year-old male from home via EMS with neighbors heard yelling, was found on floor incontinent of stool, confused  Records reviewed and has history of recent fall and left humerus fracture  Seen by orthopedist, was using left upper extremity immobilizer  Also history of substance abuse/opiate use  Patient is poor historian and believes he was up all night  Unaware of how wound upon ground, yelling  Currently denies chest pain and dyspnea  He denies abdominal pain  Notes he has left shoulder and left knee pain  History provided by:  Patient and EMS personnel  History limited by:  Mental status change  Altered Mental Status  Presenting symptoms: confusion    Severity:  Unable to specify  Progression:  Unable to specify  Chronicity:  New  Context: drug use    Associated symptoms: hallucinations    Associated symptoms: no abdominal pain        Prior to Admission Medications   Prescriptions Last Dose Informant Patient Reported? Taking?    DULoxetine (CYMBALTA) 60 mg delayed release capsule   Yes No   Sig: Take 120 mg by mouth daily   atorvastatin (LIPITOR) 20 mg tablet   Yes No   Sig: Take 20 mg by mouth daily   buprenorphine-naloxone (Suboxone) 8-2 mg   Yes No   Sig: Place 8 mg under the tongue 3 (three) times a day     diazepam (VALIUM) 5 mg tablet   Yes No   Sig: Take 10 mg by mouth every 8 (eight) hours as needed for anxiety     lurasidone (Latuda) 40 mg tablet   Yes No   Sig: Take 20 mg by mouth daily at bedtime   tamsulosin (Flomax) 0 4 mg   Yes No   Sig: Take 0 4 mg by mouth daily with dinner   traZODone (DESYREL) 150 mg tablet   Yes No   Sig: Take 150 mg by mouth daily at bedtime       Facility-Administered Medications: None       Past Medical History:   Diagnosis Date    Narcotic dependence [facility])        Past Surgical History:   Procedure Laterality Date  JOINT REPLACEMENT Bilateral     SPINAL FUSION         Family History   Problem Relation Age of Onset    Arthritis Mother      I have reviewed and agree with the history as documented  E-Cigarette/Vaping    E-Cigarette Use Never User      E-Cigarette/Vaping Substances     Social History     Tobacco Use    Smoking status: Current Every Day Smoker     Packs/day: 0 25     Years: 20 00     Pack years: 5 00     Types: Cigarettes    Smokeless tobacco: Never Used   Vaping Use    Vaping Use: Never used   Substance Use Topics    Alcohol use: Never    Drug use: Never       Review of Systems   Unable to perform ROS: Mental status change   Gastrointestinal: Negative for abdominal pain  Psychiatric/Behavioral: Positive for confusion and hallucinations  All other systems reviewed and are negative  Physical Exam  Physical Exam  Vitals and nursing note reviewed  Constitutional:       Comments: Pleasant, uncomfortable-appearing, cervical collar in place, no immobilizer that left upper extremity, lower extremities with stool, left rib strain the larger with proximal bruising and chest wall/arm, generalized swelling   HENT:      Head: Normocephalic and atraumatic  Nose: Nose normal       Mouth/Throat:      Mouth: Mucous membranes are dry  Eyes:      Conjunctiva/sclera: Conjunctivae normal       Pupils: Pupils are equal, round, and reactive to light  Cardiovascular:      Rate and Rhythm: Normal rate and regular rhythm  Pulses: Normal pulses  Heart sounds: Normal heart sounds  Pulmonary:      Effort: Pulmonary effort is normal  No respiratory distress  Breath sounds: Normal breath sounds  Abdominal:      General: Bowel sounds are normal  There is no distension  Palpations: Abdomen is soft  Tenderness: There is no abdominal tenderness  Musculoskeletal:         General: Normal range of motion  Cervical back: Neck supple  No tenderness        Comments: Left proximal humerus tender, no obvious deformity, decreased range of motion    Left knee generally tender with mild increase in size compared to opposite, large S shaped scar anteriorly/midline    No chest wall or pelvic deformity, but generally tender little worse on the left   Skin:     General: Skin is warm and dry  Neurological:      General: No focal deficit present  Mental Status: He is alert and oriented to person, place, and time  Cranial Nerves: No cranial nerve deficit  Sensory: No sensory deficit  Motor: No weakness  Coordination: Coordination normal    Psychiatric:         Behavior: Behavior normal          Thought Content:  Thought content normal          Judgment: Judgment normal          Vital Signs  ED Triage Vitals   Temperature Pulse Respirations Blood Pressure SpO2   04/25/22 1026 04/25/22 1026 04/25/22 1030 04/25/22 1026 04/25/22 1020   (!) 96 8 °F (36 °C) 82 22 131/74 100 %      Temp Source Heart Rate Source Patient Position - Orthostatic VS BP Location FiO2 (%)   04/25/22 1145 04/25/22 1045 04/25/22 1045 04/25/22 1050 --   Temporal Monitor Lying Right arm       Pain Score       04/25/22 1045       10 - Worst Possible Pain           Vitals:    04/25/22 1630 04/25/22 1631 04/25/22 1730 04/25/22 1927   BP: 134/67 134/67 128/70 126/74   Pulse: 88  85 90   Patient Position - Orthostatic VS:   Lying          Visual Acuity  Visual Acuity      Most Recent Value   L Pupil Size (mm) 3   R Pupil Size (mm) 3   L Pupil Shape Round   R Pupil Shape Round          ED Medications  Medications   sodium chloride 0 9 % infusion (100 mL/hr Intravenous New Bag 4/25/22 1816)   acetaminophen (TYLENOL) tablet 650 mg (has no administration in time range)   ondansetron (ZOFRAN) injection 4 mg (has no administration in time range)   calcium carbonate (TUMS) chewable tablet 1,000 mg (has no administration in time range)   DULoxetine (CYMBALTA) delayed release capsule 120 mg (has no administration in time range)   tamsulosin (FLOMAX) capsule 0 4 mg (has no administration in time range)   diazepam (VALIUM) tablet 5 mg (has no administration in time range)   lurasidone (LATUDA) tablet 20 mg (has no administration in time range)   traZODone (DESYREL) tablet 150 mg (has no administration in time range)   buprenorphine-naloxone (Suboxone) film 8 mg (has no administration in time range)   nicotine (NICODERM CQ) 7 mg/24hr TD 24 hr patch 1 patch (has no administration in time range)   atorvastatin (LIPITOR) tablet 40 mg (40 mg Oral Not Given 4/25/22 1940)   aspirin chewable tablet 81 mg (has no administration in time range)   iohexol (OMNIPAQUE) 350 MG/ML injection (SINGLE-DOSE) 100 mL (100 mL Intravenous Given 4/25/22 1100)       Diagnostic Studies  Results Reviewed     Procedure Component Value Units Date/Time    Vitamin B12 [[id number]] Collected: 04/25/22 1047    Lab Status: In process Specimen: Blood from Arm, Right Updated: 04/25/22 1726    Rapid drug screen, urine [[id number]]  (Abnormal) Collected: 04/25/22 1245    Lab Status: Final result Specimen: Urine, Catheter Updated: 04/25/22 1400     Amph/Meth UR Negative     Barbiturate Ur Negative     Benzodiazepine Urine Positive     Cocaine Urine Negative     Methadone Urine Negative     Opiate Urine Negative     PCP Ur Negative     THC Urine Negative     Oxycodone Urine Negative    Narrative:      Presumptive report  If requested, specimen will be sent to reference lab for confirmation  FOR MEDICAL PURPOSES ONLY  IF CONFIRMATION NEEDED PLEASE CONTACT THE LAB WITHIN 5 DAYS      Drug Screen Cutoff Levels:  AMPHETAMINE/METHAMPHETAMINES  1000 ng/mL  BARBITURATES     200 ng/mL  BENZODIAZEPINES     200 ng/mL  COCAINE      300 ng/mL  METHADONE      300 ng/mL  OPIATES      300 ng/mL  PHENCYCLIDINE     25 ng/mL  THC       50 ng/mL  OXYCODONE      100 ng/mL    CKMB [[id number]]  (Normal) Collected: 04/25/22 1047    Lab Status: Final result Specimen: Blood from Arm, Right Updated: 04/25/22 1338     CK-MB Index 1 4 %      CK-MB 3 0 ng/mL     HS Troponin I 2hr [[id number]]  (Normal) Collected: 04/25/22 1303    Lab Status: Final result Specimen: Blood from Arm, Right Updated: 04/25/22 1329     hs TnI 2hr 7 ng/L      Delta 2hr hsTnI 0 ng/L     CK (with reflex to MB) [[id number]]  (Normal) Collected: 04/25/22 1047    Lab Status: Final result Specimen: Blood from Arm, Right Updated: 04/25/22 1318     Total  U/L     Urine Microscopic [[id number]]  (Abnormal) Collected: 04/25/22 1245    Lab Status: Final result Specimen: Urine, Straight Cath Updated: 04/25/22 1307     RBC, UA 4-10 /hpf      WBC, UA 0-1 /hpf      Epithelial Cells Occasional /hpf      Bacteria, UA Occasional /hpf      Hyaline Casts, UA 0-1 /lpf      OTHER OBSERVATIONS Sperm Present     MUCUS THREADS Occasional    UA w Reflex to Microscopic w Reflex to Culture [[id number]]  (Abnormal) Collected: 04/25/22 1245    Lab Status: Final result Specimen: Urine, Straight Cath Updated: 04/25/22 1259     Color, UA Dk Yellow     Clarity, UA Clear     Specific Gravity, UA >=1 030     pH, UA 6 0     Leukocytes, UA Negative     Nitrite, UA Negative     Protein, UA Negative mg/dl      Glucose, UA Negative mg/dl      Ketones, UA Negative mg/dl      Urobilinogen, UA 0 2 E U /dl      Bilirubin, UA Negative     Blood, UA Trace-Intact    COVID/FLU/RSV - 2 hour TAT [[id number]]  (Normal) Collected: 04/25/22 1046    Lab Status: Final result Specimen: Nares from Nose Updated: 04/25/22 1136     SARS-CoV-2 Negative     INFLUENZA A PCR Negative     INFLUENZA B PCR Negative     RSV PCR Negative    Narrative:      FOR PEDIATRIC PATIENTS - copy/paste COVID Guidelines URL to browser: [email or web address]    SARS-CoV-2 assay is a Nucleic Acid Amplification assay intended for the  qualitative detection of nucleic acid from SARS-CoV-2 in nasopharyngeal  swabs   Results are for the presumptive identification of SARS-CoV-2 RNA  Positive results are indicative of infection with SARS-CoV-2, the virus  causing COVID-19, but do not rule out bacterial infection or co-infection  with other viruses  Laboratories within the [place] and its  territories are required to report all positive results to the appropriate  public health authorities  Negative results do not preclude SARS-CoV-2  infection and should not be used as the sole basis for treatment or other  patient management decisions  Negative results must be combined with  clinical observations, patient history, and epidemiological information  This test has not been FDA cleared or approved  This test has been authorized by FDA under an Emergency Use Authorization  (EUA)  This test is only authorized for the duration of time the  declaration that circumstances exist justifying the authorization of the  emergency use of an in vitro diagnostic tests for detection of SARS-CoV-2  virus and/or diagnosis of COVID-19 infection under section 564(b)(1) of  the Act, 21 U  S C  306NGC-2(V)(6), unless the authorization is terminated  or revoked sooner  The test has been validated but independent review by FDA  and CLIA is pending  Test performed using [employer] GeneXpert: This RT-PCR assay targets N2,  a region unique to SARS-CoV-2  A conserved region in the E-gene was chosen  for pan-Sarbecovirus detection which includes SARS-CoV-2  HS Troponin 0hr (reflex protocol) [[id number]]  (Normal) Collected: 04/25/22 1046    Lab Status: Final result Specimen: Blood from Arm, Right Updated: 04/25/22 1126     hs TnI 0hr 7 ng/L     Blood culture #1 [[id number]] Collected: 04/25/22 1121    Lab Status:  In process Specimen: Blood from Arm, Right Updated: 04/25/22 1124    Lactic acid [[id number]]  (Normal) Collected: 04/25/22 1046    Lab Status: Final result Specimen: Blood from Arm, Right Updated: 04/25/22 1122     LACTIC ACID 0 7 mmol/L     Narrative:      Result may be elevated if tourniquet was used during collection  Protime-INR [[id number]]  (Normal) Collected: 04/25/22 1047    Lab Status: Final result Specimen: Blood from Arm, Right Updated: 04/25/22 1114     Protime 12 9 seconds      INR 0 98    APTT [[id number]]  (Normal) Collected: 04/25/22 1047    Lab Status: Final result Specimen: Blood from Arm, Right Updated: 04/25/22 1114     PTT 34 seconds     Basic metabolic panel [[id number]] Collected: 04/25/22 1047    Lab Status: Final result Specimen: Blood from Arm, Right Updated: 04/25/22 1114     Sodium 141 mmol/L      Potassium 4 2 mmol/L      Chloride 102 mmol/L      CO2 28 mmol/L      ANION GAP 11 mmol/L      BUN 15 mg/dL      Creatinine 0 70 mg/dL      Glucose 129 mg/dL      Calcium 8 5 mg/dL      eGFR 102 ml/min/1 73sq m     Narrative:      [place] guidelines for Chronic Kidney Disease (CKD):     Stage 1 with normal or high GFR (GFR > 90 mL/min/1 73 square meters)    Stage 2 Mild CKD (GFR = 60-89 mL/min/1 73 square meters)    Stage 3A Moderate CKD (GFR = 45-59 mL/min/1 73 square meters)    Stage 3B Moderate CKD (GFR = 30-44 mL/min/1 73 square meters)    Stage 4 Severe CKD (GFR = 15-29 mL/min/1 73 square meters)    Stage 5 End Stage CKD (GFR <15 mL/min/1 73 square meters)  Note: GFR calculation is accurate only with a steady state creatinine    Ethanol [[id number]]  (Normal) Collected: 04/25/22 1047    Lab Status: Final result Specimen: Blood from Arm, Right Updated: 04/25/22 1114     Ethanol Lvl <3 mg/dL     CBC and Platelet [[id number]]  (Abnormal) Collected: 04/25/22 1046    Lab Status: Final result Specimen: Blood from Arm, Right Updated: 04/25/22 1059     WBC 9 02 Thousand/uL      RBC 3 24 Million/uL      Hemoglobin 11 4 g/dL      Hematocrit 33 6 %       fL      MCH 35 2 pg      MCHC 33 9 g/dL      RDW 14 6 %      Platelets 152 Thousands/uL      MPV 9 3 fL     Blood culture #2 [[id number]] Collected: 04/25/22 1053    Lab Status:  In process Specimen: Blood from Line, Venous Updated: 04/25/22 1055    Fingerstick Glucose (POCT) [[id number]]  (Normal) Collected: 04/25/22 1044    Lab Status: Final result Updated: 04/25/22 1048     POC Glucose 129 mg/dl                  XR shoulder 2+ views LEFT   ED Interpretation by Sheryle Babe, DO (04/25 1125)   Stable appearing proximal left humeral fracture      Final Result by Cornell Murray MD (04/25 1226)      Persistent medially displaced previously acute left humeral head and neck fracture      Workstation performed: [id number]         XR knee 4+ views left injury   ED Interpretation by Sheryle Babe, DO (04/25 1126)   No obvious acute fracture or malalignment      Final Result by Cornell Murray MD (04/25 1225)   Intact total knee arthroplasty  Healed distal femoral fracture status post ORIF      No acute osseous abnormality  Workstation performed: [id number]         CT stroke alert brain   Final Result by Eleazar Morales MD (04/25 1111)      1  No acute intracranial hemorrhage, mass effect or edema  2   Mild,, age determinant microangiopathy  Consider MRI of the brain for further assessment, as clinically indicated  Workstation performed: [id number]         CTA stroke alert (head/neck)   Final Result by Eleazar Morales MD (04/25 1121)         1  No hemodynamically significant stenosis in the major arteries of the neck  2   No intracranial aneurysm or major intracranial arterial stenosis  I personally communicated the results this study with Dr Deshawn Ferrer on 4/25/2022 at 11:17 AM                            Workstation performed: [id number]         TRAUMA - CT spine cervical wo contrast   Final Result by Ceferino Reyes MD (04/25 1226)      No cervical spine fracture or traumatic malalignment                     Workstation performed: [id number]         TRAUMA - CT chest abdomen pelvis w contrast   Final Result by Ceferino Reyes MD (04/25 1238)      Probable subacute left-sided rib fractures  Left proximal humerus fracture, incompletely evaluated  Otherwise no acute fractures identified  No traumatic visceral injury in the chest, abdomen or pelvis  Marked distention of the urinary bladder  Tiny nonobstructing intrarenal calculi  Workstation performed: [id number]         XR Trauma chest portable   ED Interpretation by Yeny Phelps DO (04/25 1126)   No obvious fracture or pneumothorax      Final Result by Racquel June MD (04/25 1221)      No acute cardiopulmonary disease  Workstation performed: [id number]         XR Trauma pelvis ap only 1 or 2 vw   Final Result by Racquel June MD (04/25 1245)      No acute osseous abnormality  Workstation performed: [id number]         MRI Inpatient Order    (Results Pending)              Procedures  Procedures         ED Course  ED Course as of 04/25/22 1955 Mon Apr 25, 2022   1057 EKG 10:47 a m   Normal sinus rhythm rate 93 normal axis normal intervals T-wave inversions V1 V2 no ST elevation or depression interpreted by me   1123 MEDICAL ALCOHOL: <3   1123 LACTIC ACID: 0 7   1123 WBC: 9 02   1123 Creatinine: 0 70   1130 Neurology Devarinti discussed, not TPA candidate based on timing / clinical findings / NIH 0   1245 Intact saddle area sensation  Deep tendon reflexes at knees 1-2/4 bilaterally ankles 2/4 bilaterally  Straight cath resulted in 900 cc output  He is unaware of fever chills or back injury   1307 HM Zak accepts   1307 Leukocytes, UA: Negative   1307 Nitrite, UA: Negative   1408 Patient remains stable, son Nakul Michel aware of hospitalization and agreeable   1426 Initially trauma alerted and stroke alert added once evaluated secondary to confusion / history of humerus fracture / chest wall injury   1429 Total CK: 213                  Stroke Assessment     Row Name 04/25/22 1046             NIH Stroke Scale    Interval Baseline      Level of Consciousness (1a ) 0      LOC Questions (1b ) 0      LOC Commands (1c ) 0      Best Gaze (2 ) 0      Visual (3 ) 0      Facial Palsy (4 ) 0      Motor Arm, Left (5a ) 0      Motor Arm, Right (5b ) 0      Motor Leg, Left (6a ) 0      Motor Leg, Right (6b ) 0      Limb Ataxia (7 ) 0      Sensory (8 ) 0      Best Language (9 ) 0      Dysarthria (10 ) 0      Extinction and Inattention (11 ) (Formerly Neglect) 0      Total 0                                          MDM    Disposition  Final diagnoses:   Confusion   Multiple trauma   Humerus fracture   Urinary retention   Rib fractures     Time reflects when diagnosis was documented in both MDM as applicable and the Disposition within this note     Time User Action Codes Description Comment    4/25/2022 10:38 AM Vernon Burow Add [R41 0] Confusion     4/25/2022 10:38 AM Afshan Mancuso Walker Vu Add [T07  XXXA] Multiple trauma     4/25/2022 11:43 AM Vernon Burow Add [S42 309A] Humerus fracture     4/25/2022  1:06 PM Vernon Burow Add [R33 9] Urinary retention     4/25/2022  1:06 PM [place] Burow Add [S22 49XA] Rib fractures     4/25/2022  5:20 PM Venetta Nickel Add Karma Dole Left humeral fracture     4/25/2022  5:43 PM Venetta Nickel Add [U77 97] Acute metabolic encephalopathy       ED Disposition     ED Disposition Condition Date/Time Comment    Admit Stable Mon Apr 25, 2022  1:06 PM Case was discussed with Zak and the patient's admission status was agreed to be Admission Status: inpatient status to the service of Dr Denny Boyer          Follow-up Information    None         Current Discharge Medication List      CONTINUE these medications which have NOT CHANGED    Details   atorvastatin (LIPITOR) 20 mg tablet Take 20 mg by mouth daily      buprenorphine-naloxone (Suboxone) 8-2 mg Place 8 mg under the tongue 3 (three) times a day        diazepam (VALIUM) 5 mg tablet Take 10 mg by mouth every 8 (eight) hours as needed for anxiety        DULoxetine (CYMBALTA) 60 mg delayed release capsule Take 120 mg by mouth daily      lurasidone (Latuda) 40 mg tablet Take 20 mg by mouth daily at bedtime      tamsulosin (Flomax) 0 4 mg Take 0 4 mg by mouth daily with dinner      traZODone (DESYREL) 150 mg tablet Take 150 mg by mouth daily at bedtime              No discharge procedures on file      PDMP Review     None          ED Provider  Electronically Signed by           Toribio Kahn,   04/25/22 Dariela Walsh 1420,   04/25/22 Dariela Walsh 1420,   04/25/22 1429       Toribio Kahn,   04/25/22 1956

## 2022-04-25 NOTE — ASSESSMENT & PLAN NOTE
Patient is complaining of chronic back pain  Continue Suboxone as per home regimen starting tomorrow  Patient is seen by addition clinic [facility]  Note reviewed    He is supposed to be on 08/02 mg 1 film sublingual t i d  if patient is more awake later today he may receive a dose tonight as well

## 2022-04-25 NOTE — QUICK NOTE
Stroke alert called: unclear time/ I did not receive alert until radiology messaged me on stat ct/cta 1117 AM  Neurology response immediate 1118 AM  CT/CTA H/N no LVO, no acute findings  LKW: last night  NIHSS 0  UDS pending    Patient at home found incontinent of stool, yelling and had a fall per ED report, pt poor historian due to confusion and unable to tell why he was on floor  CTH/CTA H/N not acute and no LVO    IVtPa: no - out of window and exam less concerning for stroke    A/P    Possible toxic metabolic encephalopathy (UDS pending I am told prior hx drug use) causing falls confusion  CT CAP with multiple rib fractures also  Less likely stroke however would need to rule out  Okay to place patient on stroke protocol for now, mri brain no contrast given confusion and unclear etiology of falls, if no ischemic stroke noted then okay to cancel mri brain    Infectious other appropriate work up per primary team    Spoke to ED physician at time of alert

## 2022-04-25 NOTE — ASSESSMENT & PLAN NOTE
CT chest and pelvis shows There are fractures of the left 6, 7th, 8th, and 9th ribs with reactive bone formation present suggesting that these fractures are subacute or less likely chronic  Continue pain control    These do not appear to be acute fractures and hence continue supportive care

## 2022-04-25 NOTE — H&P
[address]  H&P- Jeremy Brown 1960, 63 y o  male MRN: [id number]  Unit/Bed#: TR 13B Encounter: [id number]  Primary Care Provider: Sandra Quiroga MD   Date and time admitted to hospital: 4/25/2022 10:18 AM    * Acute metabolic encephalopathy  Assessment & Plan  Patient noted to have worsening confusion, yelling for help,  Found on the floor by neighbors  Found to be incontinent of stool  Currently in the ED he is having episodes of somnolence however is arousable for few minutes and then he drifts back to sleep  Urine drug screen is positive for benzos  Patient is chronically on Valium 10 mg 3 times daily and also on Suboxone 8/2 mg 1 sublingual film 3 times daily  Unclear whether he was abusing medications are withdrawing from medications  CT brain and CTa head and neck reviewed  No acute pathology noted  Continue neuro checks for now   Resume home medications from tomorrow unless he becomes more awake later tonight and wants to take his Valium and Suboxone and that would be okay  Differential at this time includes drug withdrawal versus intoxication, seizure, less likely stroke  Will do MRI brain  Discussed with Neurology  Left humeral fracture  Assessment & Plan  Patient had left humeral fracture diagnosed on 04/04/2022 on an ED visit  Still present on imaging  Will ask Orthopedics for evaluation  Place left arm in the sling and ask PT OT for evaluation    Closed fracture of multiple ribs of left side with routine healing  Assessment & Plan  CT chest and pelvis shows There are fractures of the left 6, 7th, 8th, and 9th ribs with reactive bone formation present suggesting that these fractures are subacute or less likely chronic  Continue pain control  These do not appear to be acute fractures and hence continue supportive care    Back pain  Assessment & Plan  Patient is complaining of chronic back pain    Continue Suboxone as per home regimen starting tomorrow  Patient is seen by addition clinic [facility]  Note reviewed  He is supposed to be on 08/02 mg 1 film sublingual t i d  if patient is more awake later today he may receive a dose tonight as well    Other hyperlipidemia  Assessment & Plan  Continue statin therapy    Bipolar affective disorder ([facility] [address] )  Assessment & Plan  Continue home regimen of Valium, Cymbalta and Latuda    VTE Prophylaxis: Enoxaparin (Lovenox)  / sequential compression device   Code Status:  Full code  POLST: There is no POLST form on file for this patient (pre-hospital)  Discussion with family:  ed doctor discussed with son    Anticipated Length of Stay:  Patient will be admitted on an Inpatient basis with an anticipated length of stay of  more than 2 midnights  Justification for Hospital Stay:  Acute metabolic encephalopathy    Total Time for Visit, including Counseling / Coordination of Care: 60 minutes  Greater than 50% of this total time spent on direct patient counseling and coordination of care  Chief Complaint:   Confusion    History of Present Illness:    Justyna Oconnor is a 63 y o  male who presents with confusion  Patient was found yelling by neighbors who found him on the floor with stool incontinence  Unclear exactly what happened  Patient still quite confused and somnolent and at times wakes up briefly says a few words and then goes back to sleep  Denies drug overdose  Complains of pain in his back  Unable to get an accurate history due to current change in mental status  Recently found to have left humeral fracture  Also takes Suboxone and Valium      Review of Systems:    Review of Systems   Unable to perform ROS: Mental status change       Past Medical and Surgical History:     Past Medical History:   Diagnosis Date    Narcotic dependence ([facility] [address] )        Past Surgical History:   Procedure Laterality Date    JOINT REPLACEMENT Bilateral     SPINAL FUSION         Meds/Allergies:    Prior to Admission medications    Medication Sig Start Date End Date Taking? Authorizing Provider   atorvastatin (LIPITOR) 20 mg tablet Take 20 mg by mouth daily    Historical Provider, MD   buprenorphine-naloxone (Suboxone) 8-2 mg Place 8 mg under the tongue 3 (three) times a day   4/11/22   Historical Provider, MD   diazepam (VALIUM) 5 mg tablet Take 10 mg by mouth every 8 (eight) hours as needed for anxiety      Historical Provider, MD   DULoxetine (CYMBALTA) 60 mg delayed release capsule Take 120 mg by mouth daily    Historical Provider, MD   lurasidone (Latuda) 40 mg tablet Take 20 mg by mouth daily at bedtime    Historical Provider, MD   tamsulosin (Flomax) 0 4 mg Take 0 4 mg by mouth daily with dinner    Historical Provider, MD   traZODone (DESYREL) 150 mg tablet Take 150 mg by mouth daily at bedtime  3/9/22   Historical Provider, MD     I have reviewed home medications with a medical source (PCP, Pharmacy, other)  Allergies: No Known Allergies    Social History:     Marital Status:    Social History     Substance and Sexual Activity   Alcohol Use Never     Social History     Tobacco Use   Smoking Status Current Every Day Smoker    Packs/day: 0 25    Years: 20 00    Pack years: 5 00    Types: Cigarettes   Smokeless Tobacco Never Used     Social History     Substance and Sexual Activity   Drug Use Never       Family History:    Family History   Problem Relation Age of Onset    Arthritis Mother        Physical Exam:     Vitals:   Blood Pressure: 134/67 (04/25/22 1631)  Pulse: 88 (04/25/22 1630)  Temperature: 97 8 °F (36 6 °C) (04/25/22 1145)  Temp Source: Temporal (04/25/22 1145)  Respirations: 22 (04/25/22 1630)  Weight - Scale: 87 kg (191 lb 12 8 oz) (04/25/22 1147)  SpO2: 97 % (04/25/22 1630)    Physical Exam  Vitals and nursing note reviewed  Constitutional:       Appearance: He is ill-appearing  Comments: Somnolent  Barely arousable  HENT:      Head: Normocephalic and atraumatic        Right Ear: External ear normal       Left Ear: External ear normal       Nose: Nose normal       Mouth/Throat:      Pharynx: Oropharynx is clear  Eyes:      Pupils: Pupils are equal, round, and reactive to light  Comments: Pupils are equal and reactive to light  No pinpoint pupils noted   Cardiovascular:      Rate and Rhythm: Normal rate and regular rhythm  Heart sounds: Normal heart sounds  Pulmonary:      Effort: Pulmonary effort is normal       Breath sounds: Normal breath sounds  Comments: Bruising noted on the left side of the chest  Abdominal:      General: Bowel sounds are normal       Palpations: Abdomen is soft  Tenderness: There is no abdominal tenderness  Musculoskeletal:      Cervical back: Normal range of motion and neck supple  Comments: Able to move his arms  Also able to lift his legs above gravity  Skin:     General: Skin is warm and dry  Capillary Refill: Capillary refill takes less than 2 seconds  Neurological:      Mental Status: He is disoriented  Comments: Somnolent  Arousable  Oriented to person place  drifts back to sleep  Psychiatric:      Comments: Psychomotor slowing noted           Additional Data:     Lab Results: I have personally reviewed pertinent reports  Results from last 7 days   Lab Units 04/25/22  1046   WBC Thousand/uL 9 02   HEMOGLOBIN g/dL 11 4*   HEMATOCRIT % 33 6*   PLATELETS Thousands/uL 355     Results from last 7 days   Lab Units 04/25/22  1047   SODIUM mmol/L 141   POTASSIUM mmol/L 4 2   CHLORIDE mmol/L 102   CO2 mmol/L 28   BUN mg/dL 15   CREATININE mg/dL 0 70   ANION GAP mmol/L 11   CALCIUM mg/dL 8 5   GLUCOSE RANDOM mg/dL 129     Results from last 7 days   Lab Units 04/25/22  1047   INR  0 98     Results from last 7 days   Lab Units 04/25/22  1044   POC GLUCOSE mg/dl 129         Results from last 7 days   Lab Units 04/25/22  1046   LACTIC ACID mmol/L 0 7       Imaging: I have personally reviewed pertinent reports        XR shoulder 2+ views LEFT   ED Interpretation by Mian Mcgee DO (04/25 1125)   Stable appearing proximal left humeral fracture      Final Result by Goran Hunt MD (04/25 1226)      Persistent medially displaced previously acute left humeral head and neck fracture      Workstation performed: [id number]         XR knee 4+ views left injury   ED Interpretation by Mian Mcgee DO (04/25 1126)   No obvious acute fracture or malalignment      Final Result by Goran Hunt MD (04/25 1225)   Intact total knee arthroplasty  Healed distal femoral fracture status post ORIF      No acute osseous abnormality  Workstation performed: [id number]         CT stroke alert brain   Final Result by Araceli Wright MD (04/25 1111)      1  No acute intracranial hemorrhage, mass effect or edema  2   Mild,, age determinant microangiopathy  Consider MRI of the brain for further assessment, as clinically indicated  Workstation performed: [id number]         CTA stroke alert (head/neck)   Final Result by Araceli Wright MD (04/25 1121)         1  No hemodynamically significant stenosis in the major arteries of the neck  2   No intracranial aneurysm or major intracranial arterial stenosis  I personally communicated the results this study with Dr Khloe Guerrero on 4/25/2022 at 11:17 AM                            Workstation performed: [id number]         TRAUMA - CT spine cervical wo contrast   Final Result by Jian Quintero MD (04/25 1226)      No cervical spine fracture or traumatic malalignment  Workstation performed: [id number]         TRAUMA - CT chest abdomen pelvis w contrast   Final Result by Jian Quintero MD (04/25 1238)      Probable subacute left-sided rib fractures  Left proximal humerus fracture, incompletely evaluated  Otherwise no acute fractures identified  No traumatic visceral injury in the chest, abdomen or pelvis  Marked distention of the urinary bladder  Tiny nonobstructing intrarenal calculi  Workstation performed: [id number]         XR Trauma chest portable   ED Interpretation by Omid Serna DO (04/25 1126)   No obvious fracture or pneumothorax      Final Result by Robert Martinez MD (04/25 1221)      No acute cardiopulmonary disease  Workstation performed: [id number]         XR Trauma pelvis ap only 1 or 2 vw   Final Result by Robert Martinez MD (04/25 1245)      No acute osseous abnormality  Workstation performed: [id number]             EKG, Pathology, and Other Studies Reviewed on Admission:   · EKG:  Normal sinus rhythm at 80 beats per minute    Allscripts / Epic Records Reviewed: Yes     ** Please Note: This note has been constructed using a voice recognition system   **

## 2022-04-26 ENCOUNTER — APPOINTMENT (INPATIENT)
Dept: MRI IMAGING | Facility: HOSPITAL | Age: 62
DRG: 091 | End: 2022-04-26
Payer: MEDICARE

## 2022-04-26 LAB
ALBUMIN SERPL BCP-MCNC: 2.9 G/DL (ref 3.5–5)
ALP SERPL-CCNC: 96 U/L (ref 46–116)
ALT SERPL W P-5'-P-CCNC: 21 U/L (ref 12–78)
AMMONIA PLAS-SCNC: 22 UMOL/L (ref 11–35)
ANION GAP SERPL CALCULATED.3IONS-SCNC: 8 MMOL/L (ref 4–13)
AST SERPL W P-5'-P-CCNC: 17 U/L (ref 5–45)
BILIRUB SERPL-MCNC: 0.48 MG/DL (ref 0.2–1)
BUN SERPL-MCNC: 11 MG/DL (ref 5–25)
CALCIUM ALBUM COR SERPL-MCNC: 9 MG/DL (ref 8.3–10.1)
CALCIUM SERPL-MCNC: 8.1 MG/DL (ref 8.3–10.1)
CHLORIDE SERPL-SCNC: 105 MMOL/L (ref 100–108)
CHOLEST SERPL-MCNC: 180 MG/DL
CK SERPL-CCNC: 61 U/L (ref 39–308)
CO2 SERPL-SCNC: 26 MMOL/L (ref 21–32)
CREAT SERPL-MCNC: 0.67 MG/DL (ref 0.6–1.3)
ERYTHROCYTE [DISTWIDTH] IN BLOOD BY AUTOMATED COUNT: 15.1 % (ref 11.6–15.1)
EST. AVERAGE GLUCOSE BLD GHB EST-MCNC: 97 MG/DL
GFR SERPL CREATININE-BSD FRML MDRD: 104 ML/MIN/1.73SQ M
GLUCOSE SERPL-MCNC: 100 MG/DL (ref 65–140)
HBA1C MFR BLD: 5 %
HCT VFR BLD AUTO: 32.2 % (ref 36.5–49.3)
HDLC SERPL-MCNC: 43 MG/DL
HGB BLD-MCNC: 10.7 G/DL (ref 12–17)
LDLC SERPL CALC-MCNC: 105 MG/DL (ref 0–100)
MCH RBC QN AUTO: 34.7 PG (ref 26.8–34.3)
MCHC RBC AUTO-ENTMCNC: 33.2 G/DL (ref 31.4–37.4)
MCV RBC AUTO: 105 FL (ref 82–98)
PLATELET # BLD AUTO: 342 THOUSANDS/UL (ref 149–390)
PMV BLD AUTO: 9.2 FL (ref 8.9–12.7)
POTASSIUM SERPL-SCNC: 3.5 MMOL/L (ref 3.5–5.3)
PROT SERPL-MCNC: 5.7 G/DL (ref 6.4–8.2)
RBC # BLD AUTO: 3.08 MILLION/UL (ref 3.88–5.62)
SODIUM SERPL-SCNC: 139 MMOL/L (ref 136–145)
TRIGL SERPL-MCNC: 162 MG/DL
TSH SERPL DL<=0.05 MIU/L-ACNC: 1.23 UIU/ML (ref 0.45–4.5)
WBC # BLD AUTO: 6.22 THOUSAND/UL (ref 4.31–10.16)

## 2022-04-26 PROCEDURE — 82550 ASSAY OF CK (CPK): CPT | Performed by: FAMILY MEDICINE

## 2022-04-26 PROCEDURE — 99233 SBSQ HOSP IP/OBS HIGH 50: CPT | Performed by: FAMILY MEDICINE

## 2022-04-26 PROCEDURE — 97163 PT EVAL HIGH COMPLEX 45 MIN: CPT

## 2022-04-26 PROCEDURE — 83036 HEMOGLOBIN GLYCOSYLATED A1C: CPT | Performed by: FAMILY MEDICINE

## 2022-04-26 PROCEDURE — 80053 COMPREHEN METABOLIC PANEL: CPT | Performed by: FAMILY MEDICINE

## 2022-04-26 PROCEDURE — 99024 POSTOP FOLLOW-UP VISIT: CPT | Performed by: ORTHOPAEDIC SURGERY

## 2022-04-26 PROCEDURE — 85027 COMPLETE CBC AUTOMATED: CPT | Performed by: FAMILY MEDICINE

## 2022-04-26 PROCEDURE — 80061 LIPID PANEL: CPT | Performed by: FAMILY MEDICINE

## 2022-04-26 PROCEDURE — 82140 ASSAY OF AMMONIA: CPT | Performed by: FAMILY MEDICINE

## 2022-04-26 PROCEDURE — 84443 ASSAY THYROID STIM HORMONE: CPT | Performed by: FAMILY MEDICINE

## 2022-04-26 RX ORDER — HEPARIN SODIUM 5000 [USP'U]/ML
5000 INJECTION, SOLUTION INTRAVENOUS; SUBCUTANEOUS EVERY 8 HOURS SCHEDULED
Status: DISCONTINUED | OUTPATIENT
Start: 2022-04-26 | End: 2022-04-27

## 2022-04-26 RX ORDER — IBUPROFEN 600 MG/1
600 TABLET ORAL EVERY 6 HOURS PRN
Status: DISCONTINUED | OUTPATIENT
Start: 2022-04-26 | End: 2022-04-27

## 2022-04-26 RX ORDER — LIDOCAINE 50 MG/G
1 PATCH TOPICAL DAILY
Status: DISCONTINUED | OUTPATIENT
Start: 2022-04-26 | End: 2022-05-09 | Stop reason: HOSPADM

## 2022-04-26 RX ADMIN — IBUPROFEN 600 MG: 600 TABLET ORAL at 11:39

## 2022-04-26 RX ADMIN — BUPRENORPHINE AND NALOXONE 8 MG: 8; 2 FILM BUCCAL; SUBLINGUAL at 09:00

## 2022-04-26 RX ADMIN — BUPRENORPHINE AND NALOXONE 8 MG: 8; 2 FILM BUCCAL; SUBLINGUAL at 20:23

## 2022-04-26 RX ADMIN — DIAZEPAM 5 MG: 5 TABLET ORAL at 11:40

## 2022-04-26 RX ADMIN — BUPRENORPHINE AND NALOXONE 8 MG: 8; 2 FILM BUCCAL; SUBLINGUAL at 16:13

## 2022-04-26 RX ADMIN — SODIUM CHLORIDE 100 ML/HR: 0.9 INJECTION, SOLUTION INTRAVENOUS at 06:18

## 2022-04-26 RX ADMIN — TAMSULOSIN HYDROCHLORIDE 0.4 MG: 0.4 CAPSULE ORAL at 16:13

## 2022-04-26 RX ADMIN — LURASIDONE HYDROCHLORIDE 20 MG: 20 TABLET, FILM COATED ORAL at 22:10

## 2022-04-26 RX ADMIN — TRAZODONE HYDROCHLORIDE 150 MG: 100 TABLET ORAL at 22:10

## 2022-04-26 RX ADMIN — HEPARIN SODIUM 5000 UNITS: 5000 INJECTION INTRAVENOUS; SUBCUTANEOUS at 16:13

## 2022-04-26 RX ADMIN — ATORVASTATIN CALCIUM 40 MG: 40 TABLET, FILM COATED ORAL at 16:13

## 2022-04-26 RX ADMIN — LIDOCAINE 5% 1 PATCH: 700 PATCH TOPICAL at 11:39

## 2022-04-26 RX ADMIN — SODIUM CHLORIDE 100 ML/HR: 0.9 INJECTION, SOLUTION INTRAVENOUS at 17:10

## 2022-04-26 RX ADMIN — HEPARIN SODIUM 5000 UNITS: 5000 INJECTION INTRAVENOUS; SUBCUTANEOUS at 22:10

## 2022-04-26 RX ADMIN — IBUPROFEN 600 MG: 600 TABLET ORAL at 19:32

## 2022-04-26 NOTE — CONSULTS
Consult received for stroke pathway  Pt passed RN dysphagia screen per nursing flowsheets, pending ST eval  Altered TRIG and LDL levels noted  Pt tearful during time of visit, "I've had 26 surgeries and I need to have 4 more  I'm in so much pain!" Reports his pain has affected his appetite though did not explain further, continuing to report pain and tearful  Noting previous significant weight loss per chart review: 4/21/21 219lb, 6/30/21 209lb, 4/5/22 174lb, 20 5% weight loss x 1 y? 4/25/22 191lb, will monitor weights  Pt unable to give weight hx at this time  Will order cardiac diet given abnormal TRIG and LDLs  Will monitor intake and supplement as indicated, noting 50% meal completion x 1 per nursing flowsheets  Will provide diet ed as appropriate

## 2022-04-26 NOTE — PROGRESS NOTES
[address]  Progress Note - Silvia Benítez 1960, 63 y o  male MRN: [id number]  Unit/Bed#: MS [facility] Encounter: [id number]  Primary Care Provider: Andre Alcaraz MD   Date and time admitted to hospital: 4/25/2022 10:18 AM    * Acute metabolic encephalopathy  Assessment & Plan  Patient noted to have worsening confusion, yelling for help,  Found on the floor by neighbors  Found to be incontinent of stool  Currently in the ED he is having episodes of somnolence however is arousable for few minutes and then he drifts back to sleep  Urine drug screen is positive for benzos  Patient is chronically on Valium 10 mg 3 times daily and also on Suboxone 8/2 mg 1 sublingual film 3 times daily  Unclear whether he was abusing medications are withdrawing from medications  CT brain and CTa head and neck reviewed  No acute pathology noted  Continue neuro checks for now   Resume home medications  unless he becomes more awake later tonight and wants to take his Valium and Suboxone and that would be okay  Differential at this time includes drug withdrawal versus intoxication, seizure, less likely stroke  Will do MRI brain  Discussed with Neurology  Left humeral fracture  Assessment & Plan  Patient had left humeral fracture diagnosed on 04/04/2022 on an ED visit  Still present on imaging  Will ask Orthopedics for evaluation  Place left arm in the sling and ask PT OT for evaluation  Orthopedic recommendation appreciated    Closed fracture of multiple ribs of left side with routine healing  Assessment & Plan  CT chest and pelvis shows There are fractures of the left 6, 7th, 8th, and 9th ribs with reactive bone formation present suggesting that these fractures are subacute or less likely chronic  Continue pain control  These do not appear to be acute fractures and hence continue supportive care    Back pain  Assessment & Plan  Patient is complaining of chronic back pain    Continue Suboxone as per home regimen starting tomorrow  Patient is seen by addition clinic [facility]  Note reviewed  He is supposed to be on  mg 1 film sublingual t i d  if patient is more awake later today he may receive a dose tonight as well    Other hyperlipidemia  Assessment & Plan  Continue statin therapy    Bipolar affective disorder ([facility] [address] )  Assessment & Plan  Continue home regimen of Valium, Cymbalta and Latuda          VTE Pharmacologic Prophylaxis: VTE Score: 2 Moderate Risk (Score 3-4) - Pharmacological DVT Prophylaxis Ordered: heparin  Patient Centered Rounds: I performed bedside rounds with nursing staff today  Discussions with Specialists or Other Care Team Provider:  None    Education and Discussions with Family / Patient: Updated  (son) via phone  Time Spent for Care: 20 minutes  More than 50% of total time spent on counseling and coordination of care as described above  Current Length of Stay: 1 day(s)  Current Patient Status: Inpatient   Certification Statement: The patient will continue to require additional inpatient hospital stay due to To monitor above condition  Discharge Plan: Anticipate discharge in 48-72 hrs to To be determined    Code Status: Level 1 - Full Code    Subjective:   Seen and evaluated during the round  Patient complaining of pain all over the body, and he feels also depress but denies any suicidal homicidal ideation  Objective:     Vitals:   Temp (24hrs), Av 3 °F (36 8 °C), Min:97 7 °F (36 5 °C), Max:99 4 °F (37 4 °C)    Temp:  [97 7 °F (36 5 °C)-99 4 °F (37 4 °C)] 97 7 °F (36 5 °C)  HR:  [80-97] 80  Resp:  [] 20  BP: (114-137)/(64-80) 114/64  SpO2:  [95 %-99 %] 95 %  Body mass index is 26 01 kg/m²  Input and Output Summary (last 24 hours):      Intake/Output Summary (Last 24 hours) at 2022 1519  Last data filed at 2022 0615  Gross per 24 hour   Intake 1438 33 ml   Output 600 ml   Net 838 33 ml       Physical Exam: Physical Exam  Vitals and nursing note reviewed  HENT:      Nose: No congestion  Mouth/Throat:      Mouth: Mucous membranes are moist       Pharynx: No oropharyngeal exudate  Eyes:      General: No scleral icterus  Conjunctiva/sclera: Conjunctivae normal       Pupils: Pupils are equal, round, and reactive to light  Cardiovascular:      Rate and Rhythm: Normal rate  Heart sounds: No friction rub  No gallop  Pulmonary:      Effort: Pulmonary effort is normal  No respiratory distress  Breath sounds: No stridor  No wheezing or rhonchi  Abdominal:      General: Abdomen is flat  Bowel sounds are normal  There is no distension  Palpations: There is no mass  Tenderness: There is no abdominal tenderness  Hernia: No hernia is present  Musculoskeletal:         General: Tenderness (Multiple sites of the body) present  Cervical back: Normal range of motion  Lymphadenopathy:      Cervical: No cervical adenopathy  Skin:     General: Skin is warm  Capillary Refill: Capillary refill takes less than 2 seconds  Neurological:      General: No focal deficit present  Mental Status: He is alert and oriented to person, place, and time           Additional Data:     Labs:  Results from last 7 days   Lab Units 04/26/22  0430   WBC Thousand/uL 6 22   HEMOGLOBIN g/dL 10 7*   HEMATOCRIT % 32 2*   PLATELETS Thousands/uL 342     Results from last 7 days   Lab Units 04/26/22  0430   SODIUM mmol/L 139   POTASSIUM mmol/L 3 5   CHLORIDE mmol/L 105   CO2 mmol/L 26   BUN mg/dL 11   CREATININE mg/dL 0 67   ANION GAP mmol/L 8   CALCIUM mg/dL 8 1*   ALBUMIN g/dL 2 9*   TOTAL BILIRUBIN mg/dL 0 48   ALK PHOS U/L 96   ALT U/L 21   AST U/L 17   GLUCOSE RANDOM mg/dL 100     Results from last 7 days   Lab Units 04/25/22  1047   INR  0 98     Results from last 7 days   Lab Units 04/25/22  1044   POC GLUCOSE mg/dl 129     Results from last 7 days   Lab Units 04/26/22  0430   HEMOGLOBIN A1C % 5 0 Results from last 7 days   Lab Units 04/25/22  1046   LACTIC ACID mmol/L 0 7       Lines/Drains:  Invasive Devices  Report    Peripheral Intravenous Line            Peripheral IV 04/25/22 Right Antecubital 1 day    Peripheral IV 04/26/22 Right Wrist <1 day                  Telemetry:  Telemetry Orders (From admission, onward)             48 Hour Telemetry Monitoring  Continuous x 48 hours        References:    Telemetry Guidelines   Question:  Reason for 48 Hour Telemetry  Answer:  Acute CVA (<24 hrs old, hemispheric strokes, selected brainstem strokes, cardiac arrhythmias)                 Telemetry Reviewed: Normal Sinus Rhythm  Indication for Continued Telemetry Use: No indication for continued use  Will discontinue  Imaging: No pertinent imaging reviewed  Recent Cultures (last 7 days):   Results from last 7 days   Lab Units 04/25/22  1121 04/25/22  1053   BLOOD CULTURE  Received in Microbiology Lab  Culture in Progress  Received in Microbiology Lab  Culture in Progress         Last 24 Hours Medication List:   Current Facility-Administered Medications   Medication Dose Route Frequency Provider Last Rate    acetaminophen  650 mg Oral Q6H PRN Veronique Cotto MD      aspirin  81 mg Oral Daily Veronique Cotto MD      atorvastatin  40 mg Oral QPM Veronique Cotto MD      buprenorphine-naloxone  8 mg Sublingual TID Veronique Cotto MD      calcium carbonate  1,000 mg Oral Daily PRN Veronique Cotto MD      diazepam  5 mg Oral Q8H PRN Veronique Cotto MD      DULoxetine  120 mg Oral Daily Veornique Cotto MD      ibuprofen  600 mg Oral Q6H PRN Tal Noel MD      lidocaine  1 patch Topical Daily Tal Noel MD      lurasidone  20 mg Oral HS Veronique Cotto MD      nicotine  1 patch Transdermal Daily Veronique Cotto MD      ondansetron  4 mg Intravenous Q6H PRN Veronique Cotto MD      sodium chloride  100 mL/hr Intravenous Continuous Veronique Cotto  mL/hr (04/26/22 0618)    tamsulosin  0 4 mg Oral Daily With Crispin Crespo MD      traZODone  150 mg Oral HS Jake Villafuerte MD          Today, Patient Was Seen By: Albert Daugherty MD    **Please Note: This note may have been constructed using a voice recognition system  **

## 2022-04-26 NOTE — ASSESSMENT & PLAN NOTE
Patient noted to have worsening confusion, yelling for help,  Found on the floor by neighbors  Found to be incontinent of stool  Currently in the ED he is having episodes of somnolence however is arousable for few minutes and then he drifts back to sleep  Urine drug screen is positive for benzos  Patient is chronically on Valium 10 mg 3 times daily and also on Suboxone 8/2 mg 1 sublingual film 3 times daily  Unclear whether he was abusing medications are withdrawing from medications  CT brain and CTa head and neck reviewed  No acute pathology noted  Continue neuro checks for now   Resume home medications  unless he becomes more awake later tonight and wants to take his Valium and Suboxone and that would be okay  Differential at this time includes drug withdrawal versus intoxication, seizure, less likely stroke  Will do MRI brain  Discussed with Neurology

## 2022-04-26 NOTE — ASSESSMENT & PLAN NOTE
Patient had left humeral fracture diagnosed on 04/04/2022 on an ED visit  Still present on imaging  Will ask Orthopedics for evaluation    Place left arm in the sling and ask PT OT for evaluation  Orthopedic recommendation appreciated

## 2022-04-26 NOTE — PLAN OF CARE
Problem: MOBILITY - ADULT  Goal: Maintain or return to baseline ADL function  Description: INTERVENTIONS:  -  Assess patient's ability to carry out ADLs; assess patient's baseline for ADL function and identify physical deficits which impact ability to perform ADLs (bathing, care of mouth/teeth, toileting, grooming, dressing, etc )  - Assess/evaluate cause of self-care deficits   - Assess range of motion  - Assess patient's mobility; develop plan if impaired  - Assess patient's need for assistive devices and provide as appropriate  - Encourage maximum independence but intervene and supervise when necessary  - Involve family in performance of ADLs  - Assess for home care needs following discharge   - Consider OT consult to assist with ADL evaluation and planning for discharge  - Provide patient education as appropriate  Outcome: Progressing  Goal: Maintains/Returns to pre admission functional level  Description: INTERVENTIONS:  - Perform BMAT or MOVE assessment daily    - Set and communicate daily mobility goal to care team and patient/family/caregiver  - Collaborate with rehabilitation services on mobility goals if consulted  - Perform Range of Motion   times a day  - Reposition patient every   hours  - Dangle patient   times a day  - Stand patient   times a day  - Ambulate patient   times a day  - Out of bed to chair   times a day   - Out of bed for meals   Wolfgang Campanile    times a day  - Out of bed for toileting  - Record patient progress and toleration of activity level   Outcome: Progressing     Problem: PAIN - ADULT  Goal: Verbalizes/displays adequate comfort level or baseline comfort level  Description: Interventions:  - Encourage patient to monitor pain and request assistance  - Assess pain using appropriate pain scale  - Administer analgesics based on type and severity of pain and evaluate response  - Implement non-pharmacological measures as appropriate and evaluate response  - Consider cultural and social influences on pain and pain management  - Notify physician/advanced practitioner if interventions unsuccessful or patient reports new pain  Outcome: Progressing     Problem: INFECTION - ADULT  Goal: Absence or prevention of progression during hospitalization  Description: INTERVENTIONS:  - Assess and monitor for signs and symptoms of infection  - Monitor lab/diagnostic results  - Monitor all insertion sites, i e  indwelling lines, tubes, and drains  - Monitor endotracheal if appropriate and nasal secretions for changes in amount and color  - [place] appropriate cooling/warming therapies per order  - Administer medications as ordered  - Instruct and encourage patient and family to use good hand hygiene technique  - Identify and instruct in appropriate isolation precautions for identified infection/condition  Outcome: Progressing  Goal: Absence of fever/infection during neutropenic period  Description: INTERVENTIONS:  - Monitor WBC    Outcome: Progressing     Problem: DISCHARGE PLANNING  Goal: Discharge to home or other facility with appropriate resources  Description: INTERVENTIONS:  - Identify barriers to discharge w/patient and caregiver  - Arrange for needed discharge resources and transportation as appropriate  - Identify discharge learning needs (meds, wound care, etc )  - Arrange for interpretive services to assist at discharge as needed  - Refer to Case Management Department for coordinating discharge planning if the patient needs post-hospital services based on physician/advanced practitioner order or complex needs related to functional status, cognitive ability, or social support system  Outcome: Progressing     Problem: Knowledge Deficit  Goal: Patient/family/caregiver demonstrates understanding of disease process, treatment plan, medications, and discharge instructions  Description: Complete learning assessment and assess knowledge base    Interventions:  - Provide teaching at level of understanding  - Provide teaching via preferred learning methods  Outcome: Progressing     Problem: NEUROSENSORY - ADULT  Goal: Achieves stable or improved neurological status  Description: INTERVENTIONS  - Monitor and report changes in neurological status  - Monitor vital signs such as temperature, blood pressure, glucose, and any other labs ordered   - Initiate measures to prevent increased intracranial pressure  - Monitor for seizure activity and implement precautions if appropriate      Outcome: Progressing  Goal: Remains free of injury related to seizures activity  Description: INTERVENTIONS  - Maintain airway, patient safety  and administer oxygen as ordered  - Monitor patient for seizure activity, document and report duration and description of seizure to physician/advanced practitioner  - If seizure occurs,  ensure patient safety during seizure  - Reorient patient post seizure  - Seizure pads on all 4 side rails  - Instruct patient/family to notify RN of any seizure activity including if an aura is experienced  - Instruct patient/family to call for assistance with activity based on nursing assessment  - Administer anti-seizure medications if ordered    Outcome: Progressing  Goal: Achieves maximal functionality and self care  Description: INTERVENTIONS  - Monitor swallowing and airway patency with patient fatigue and changes in neurological status  - Encourage and assist patient to increase activity and self care     - Encourage visually impaired, hearing impaired and aphasic patients to use assistive/communication devices  Outcome: Progressing     Problem: MUSCULOSKELETAL - ADULT  Goal: Maintain or return mobility to safest level of function  Description: INTERVENTIONS:  - Assess patient's ability to carry out ADLs; assess patient's baseline for ADL function and identify physical deficits which impact ability to perform ADLs (bathing, care of mouth/teeth, toileting, grooming, dressing, etc )  - Assess/evaluate cause of self-care deficits   - Assess range of motion  - Assess patient's mobility  - Assess patient's need for assistive devices and provide as appropriate  - Encourage maximum independence but intervene and supervise when necessary  - Involve family in performance of ADLs  - Assess for home care needs following discharge   - Consider OT consult to assist with ADL evaluation and planning for discharge  - Provide patient education as appropriate  Outcome: Progressing  Goal: Maintain proper alignment of affected body part  Description: INTERVENTIONS:  - Support, maintain and protect limb and body alignment  - Provide patient/ family with appropriate education  Outcome: Progressing     Problem: Neurological Deficit  Goal: Neurological status is stable or improving  Description: Interventions:  - Monitor and assess patient's level of consciousness, motor function, sensory function, and level of assistance needed for ADLs  - Monitor and report changes from baseline  Collaborate with interdisciplinary team to initiate plan and implement interventions as ordered  - Provide and maintain a safe environment  - Consider seizure precautions  - Consider fall precautions  - Consider aspiration precautions  - Consider bleeding precautions  Outcome: Progressing     Problem: Activity Intolerance/Impaired Mobility  Goal: Mobility/activity is maintained at optimum level for patient  Description: Interventions:  - Assess and monitor patient  barriers to mobility and need for assistive/adaptive devices  - Assess patient's emotional response to limitations  - Collaborate with interdisciplinary team and initiate plans and interventions as ordered  - Encourage independent activity per ability   - Maintain proper body alignment  - Perform active/passive rom as tolerated/ordered    - Plan activities to conserve energy   - Turn patient as appropriate  Outcome: Progressing     Problem: Communication Impairment  Goal: Ability to express needs and understand communication  Description: Assess patient's communication skills and ability to understand information  Patient will demonstrate use of effective communication techniques, alternative methods of communication and understanding even if not able to speak  - Encourage communication and provide alternate methods of communication as needed  - Collaborate with case management/ for discharge needs  - Include patient/family/caregiver in decisions related to communication  Outcome: Progressing     Problem: Potential for Aspiration  Goal: Non-ventilated patient's risk of aspiration is minimized  Description: Assess and monitor vital signs, respiratory status, and labs (WBC)  Monitor for signs of aspiration (tachypnea, cough, rales, wheezing, cyanosis, fever)  - Assess and monitor patient's ability to swallow  - Place patient up in chair to eat if possible  - HOB up at 90 degrees to eat if unable to get patient up into chair   - Supervise patient during oral intake  - Instruct patient/ family to take small bites  - Instruct patient/ family to take small single sips when taking liquids  - Follow patient-specific strategies generated by speech pathologist   Outcome: Progressing  Goal: Ventilated patient's risk of aspiration is minimized  Description: Assess and monitor vital signs, respiratory status, airway cuff pressure, and labs (WBC)  Monitor for signs of aspiration (tachypnea, cough, rales, wheezing, cyanosis, fever)  - Elevate head of bed 30 degrees if patient has tube feeding   - Monitor tube feeding  Outcome: Progressing     Problem: Nutrition  Goal: Nutrition/Hydration status is improving  Description: Monitor and assess patient's nutrition/hydration status for malnutrition (ex- brittle hair, bruises, dry skin, pale skin and conjunctiva, muscle wasting, smooth red tongue, and disorientation)   Collaborate with interdisciplinary team and initiate plan and interventions as ordered  Monitor patient's weight and dietary intake as ordered or per policy  Utilize nutrition screening tool and intervene per policy  Determine patient's food preferences and provide high-protein, high-caloric foods as appropriate  - Assist patient with eating   - Allow adequate time for meals   - Encourage patient to take dietary supplement as ordered  - Collaborate with clinical nutritionist   - Include patient/family/caregiver in decisions related to nutrition    Outcome: Progressing     Problem: Prexisting or High Potential for Compromised Skin Integrity  Goal: Skin integrity is maintained or improved  Description: INTERVENTIONS:  - Identify patients at risk for skin breakdown  - Assess and monitor skin integrity  - Assess and monitor nutrition and hydration status  - Monitor labs   - Assess for incontinence   - Turn and reposition patient  - Assist with mobility/ambulation  - Relieve pressure over bony prominences  - Avoid friction and shearing  - Provide appropriate hygiene as needed including keeping skin clean and dry  - Evaluate need for skin moisturizer/barrier cream  - Collaborate with interdisciplinary team   - Patient/family teaching  - Consider wound care consult   Outcome: Progressing

## 2022-04-26 NOTE — PLAN OF CARE
Problem: MOBILITY - ADULT  Goal: Maintain or return to baseline ADL function  Description: INTERVENTIONS:  -  Assess patient's ability to carry out ADLs; assess patient's baseline for ADL function and identify physical deficits which impact ability to perform ADLs (bathing, care of mouth/teeth, toileting, grooming, dressing, etc )  - Assess/evaluate cause of self-care deficits   - Assess range of motion  - Assess patient's mobility; develop plan if impaired  - Assess patient's need for assistive devices and provide as appropriate  - Encourage maximum independence but intervene and supervise when necessary  - Involve family in performance of ADLs  - Assess for home care needs following discharge   - Consider OT consult to assist with ADL evaluation and planning for discharge  - Provide patient education as appropriate  Outcome: Not Progressing  Goal: Maintains/Returns to pre admission functional level  Description: INTERVENTIONS:  - Perform BMAT or MOVE assessment daily    - Set and communicate daily mobility goal to care team and patient/family/caregiver     - Collaborate with rehabilitation services on mobility goals if consulted  - Out of bed for meals 3 times a day  - Out of bed for toileting  - Record patient progress and toleration of activity level   Outcome: Not Progressing     Problem: PAIN - ADULT  Goal: Verbalizes/displays adequate comfort level or baseline comfort level  Description: Interventions:  - Encourage patient to monitor pain and request assistance  - Assess pain using appropriate pain scale  - Administer analgesics based on type and severity of pain and evaluate response  - Implement non-pharmacological measures as appropriate and evaluate response  - Consider cultural and social influences on pain and pain management  - Notify physician/advanced practitioner if interventions unsuccessful or patient reports new pain  Outcome: Not Progressing     Problem: INFECTION - ADULT  Goal: Absence or prevention of progression during hospitalization  Description: INTERVENTIONS:  - Assess and monitor for signs and symptoms of infection  - Monitor lab/diagnostic results  - Monitor all insertion sites, i e  indwelling lines, tubes, and drains  - Monitor endotracheal if appropriate and nasal secretions for changes in amount and color  - [facility] appropriate cooling/warming therapies per order  - Administer medications as ordered  - Instruct and encourage patient and family to use good hand hygiene technique  - Identify and instruct in appropriate isolation precautions for identified infection/condition  Outcome: Progressing  Goal: Absence of fever/infection during neutropenic period  Description: INTERVENTIONS:  - Monitor WBC    Outcome: Progressing     Problem: Knowledge Deficit  Goal: Patient/family/caregiver demonstrates understanding of disease process, treatment plan, medications, and discharge instructions  Description: Complete learning assessment and assess knowledge base    Interventions:  - Provide teaching at level of understanding  - Provide teaching via preferred learning methods  Outcome: Not Progressing     Problem: NEUROSENSORY - ADULT  Goal: Achieves stable or improved neurological status  Description: INTERVENTIONS  - Monitor and report changes in neurological status  - Monitor vital signs such as temperature, blood pressure, glucose, and any other labs ordered   - Initiate measures to prevent increased intracranial pressure  - Monitor for seizure activity and implement precautions if appropriate      Outcome: Not Progressing  Goal: Remains free of injury related to seizures activity  Description: INTERVENTIONS  - Maintain airway, patient safety  and administer oxygen as ordered  - Monitor patient for seizure activity, document and report duration and description of seizure to physician/advanced practitioner  - If seizure occurs,  ensure patient safety during seizure  - Reorient patient post seizure  - Seizure pads on all 4 side rails  - Instruct patient/family to notify RN of any seizure activity including if an aura is experienced  - Instruct patient/family to call for assistance with activity based on nursing assessment  - Administer anti-seizure medications if ordered    Outcome: Not Progressing  Goal: Achieves maximal functionality and self care  Description: INTERVENTIONS  - Monitor swallowing and airway patency with patient fatigue and changes in neurological status  - Encourage and assist patient to increase activity and self care     - Encourage visually impaired, hearing impaired and aphasic patients to use assistive/communication devices  Outcome: Not Progressing     Problem: MUSCULOSKELETAL - ADULT  Goal: Maintain or return mobility to safest level of function  Description: INTERVENTIONS:  - Assess patient's ability to carry out ADLs; assess patient's baseline for ADL function and identify physical deficits which impact ability to perform ADLs (bathing, care of mouth/teeth, toileting, grooming, dressing, etc )  - Assess/evaluate cause of self-care deficits   - Assess range of motion  - Assess patient's mobility  - Assess patient's need for assistive devices and provide as appropriate  - Encourage maximum independence but intervene and supervise when necessary  - Involve family in performance of ADLs  - Assess for home care needs following discharge   - Consider OT consult to assist with ADL evaluation and planning for discharge  - Provide patient education as appropriate  Outcome: Not Progressing  Goal: Maintain proper alignment of affected body part  Description: INTERVENTIONS:  - Support, maintain and protect limb and body alignment  - Provide patient/ family with appropriate education  Outcome: Not Progressing     Problem: Neurological Deficit  Goal: Neurological status is stable or improving  Description: Interventions:  - Monitor and assess patient's level of consciousness, motor function, sensory function, and level of assistance needed for ADLs  - Monitor and report changes from baseline  Collaborate with interdisciplinary team to initiate plan and implement interventions as ordered  - Provide and maintain a safe environment  - Consider seizure precautions  - Consider fall precautions  - Consider aspiration precautions  - Consider bleeding precautions  Outcome: Not Progressing     Problem: Activity Intolerance/Impaired Mobility  Goal: Mobility/activity is maintained at optimum level for patient  Description: Interventions:  - Assess and monitor patient  barriers to mobility and need for assistive/adaptive devices  - Assess patient's emotional response to limitations  - Collaborate with interdisciplinary team and initiate plans and interventions as ordered  - Encourage independent activity per ability   - Maintain proper body alignment  - Perform active/passive rom as tolerated/ordered  - Plan activities to conserve energy   - Turn patient as appropriate  Outcome: Not Progressing     Problem: Communication Impairment  Goal: Ability to express needs and understand communication  Description: Assess patient's communication skills and ability to understand information  Patient will demonstrate use of effective communication techniques, alternative methods of communication and understanding even if not able to speak  - Encourage communication and provide alternate methods of communication as needed  - Collaborate with case management/ for discharge needs  - Include patient/family/caregiver in decisions related to communication  Outcome: Progressing     Problem: Potential for Aspiration  Goal: Non-ventilated patient's risk of aspiration is minimized  Description: Assess and monitor vital signs, respiratory status, and labs (WBC)  Monitor for signs of aspiration (tachypnea, cough, rales, wheezing, cyanosis, fever)      - Assess and monitor patient's ability to swallow  - Place patient up in chair to eat if possible  - HOB up at 90 degrees to eat if unable to get patient up into chair   - Supervise patient during oral intake  - Instruct patient/ family to take small bites  - Instruct patient/ family to take small single sips when taking liquids  - Follow patient-specific strategies generated by speech pathologist   Outcome: Progressing  Goal: Ventilated patient's risk of aspiration is minimized  Description: Assess and monitor vital signs, respiratory status, airway cuff pressure, and labs (WBC)  Monitor for signs of aspiration (tachypnea, cough, rales, wheezing, cyanosis, fever)  - Elevate head of bed 30 degrees if patient has tube feeding   - Monitor tube feeding  Outcome: Progressing     Problem: Nutrition  Goal: Nutrition/Hydration status is improving  Description: Monitor and assess patient's nutrition/hydration status for malnutrition (ex- brittle hair, bruises, dry skin, pale skin and conjunctiva, muscle wasting, smooth red tongue, and disorientation)  Collaborate with interdisciplinary team and initiate plan and interventions as ordered  Monitor patient's weight and dietary intake as ordered or per policy  Utilize nutrition screening tool and intervene per policy  Determine patient's food preferences and provide high-protein, high-caloric foods as appropriate  - Assist patient with eating   - Allow adequate time for meals   - Encourage patient to take dietary supplement as ordered  - Collaborate with clinical nutritionist   - Include patient/family/caregiver in decisions related to nutrition    Outcome: Progressing

## 2022-04-26 NOTE — NURSING NOTE
Pt in bed, tearful, stating "I hurt all over, I just don't feel good " When asked pt to be more specific with pain, stated that he "hurt all over " Requesting something for pain  Suboxone given as scheduled  Dr Godwin Doing aware of same
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
## 2022-04-26 NOTE — CONSULTS
Orthopedics   Joni Hauser 63 y o  male MRN: [id number]  Unit/Bed#: -01      Chief Complaint:   Left upper extremity pain    HPI:  63 y o  male admitted to the hospital on 04/25/2022 due to an episode of altered mental status and acute metabolic encephalopathy  The patient is currently following in outpatient orthopedic clinic for a left humeral surgical neck fracture sustained on 04/04/2022  Patient is now 3 weeks post initial injury  Orthopedics was consulted today for evaluation of his left upper extremity  Today the patient states that his left shoulder pain has significantly improved, though he complains of pain along his back and hips  The patient denies any numbness or tingling in the upper left extremity  The patient has been utilizing the sling as instructed    He denies any new injury or trauma to the left upper extremity since his last office visit on 04/13/2022, though it is noted per the ED note the patient was found on his floor by his neighbors prior to hospital arrival     Review Of Systems:   · Skin: Normal  · Neuro: See HPI  · Musculoskeletal: See HPI  · 14 point review of systems negative except as stated above     Past Medical History:   Past Medical History:   Diagnosis Date    Narcotic dependence ([facility] [address] )        Past Surgical History:   Past Surgical History:   Procedure Laterality Date    JOINT REPLACEMENT Bilateral     SPINAL FUSION         Family History:  Family history reviewed and non-contributory  Family History   Problem Relation Age of Onset    Arthritis Mother        Social History:  Social History     Socioeconomic History    Marital status:      Spouse name: None    Number of children: None    Years of education: None    Highest education level: None   Occupational History    None   Tobacco Use    Smoking status: Current Every Day Smoker     Packs/day: 0 25     Years: 20 00     Pack years: 5 00     Types: Cigarettes    Smokeless tobacco: Never Used Vaping Use    Vaping Use: Never used   Substance and Sexual Activity    Alcohol use: Never    Drug use: Never    Sexual activity: Not Currently   Other Topics Concern    None   Social History Narrative    , 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  Social Determinants of Health     Financial Resource Strain: Not on file   Food Insecurity: Unknown    Worried About Running Out of Food in the Last Year: Not on file    Maco of Food in the Last Year: Never true   Transportation Needs: No Transportation Needs    Lack of Transportation (Medical): No    Lack of Transportation (Non-Medical):  No   Physical Activity: Not on file   Stress: Not on file   Social Connections: Not on file   Intimate Partner Violence: Not on file   Housing Stability: Low Risk     Unable to Pay for Housing in the Last Year: No    Number of Places Lived in the Last Year: 1    Unstable Housing in the Last Year: No       Allergies:   No Known Allergies        Labs:  0   Lab Value Date/Time    HCT 32 2 (L) 04/26/2022 0430    HCT 33 6 (L) 04/25/2022 1046    HGB 10 7 (L) 04/26/2022 0430    HGB 11 4 (L) 04/25/2022 1046    INR 0 98 04/25/2022 1047    WBC 6 22 04/26/2022 0430    WBC 9 02 04/25/2022 1046       Meds:    Current Facility-Administered Medications:     acetaminophen (TYLENOL) tablet 650 mg, 650 mg, Oral, Q6H PRN, Seble Mackey MD    aspirin chewable tablet 81 mg, 81 mg, Oral, Daily, Seble Mackey MD    atorvastatin (LIPITOR) tablet 40 mg, 40 mg, Oral, QPM, Seble Mackey MD    buprenorphine-naloxone (Suboxone) film 8 mg, 8 mg, Sublingual, TID, Seble Mackey MD, 8 mg at 04/26/22 0900    calcium carbonate (TUMS) chewable tablet 1,000 mg, 1,000 mg, Oral, Daily PRN, Seble Mackey MD    diazepam (VALIUM) tablet 5 mg, 5 mg, Oral, Q8H PRN, Seble Mackey MD    DULoxetine (CYMBALTA) delayed release capsule 120 mg, 120 mg, Oral, Daily, Seble Mackey MD    lurasidone (LATUDA) tablet 20 mg, 20 mg, Oral, HS, Seble Mackey MD  [place] nicotine (NICODERM CQ) 7 mg/24hr TD 24 hr patch 1 patch, 1 patch, Transdermal, Daily, Dank Robles MD    ondansetron (ZOFRAN) injection 4 mg, 4 mg, Intravenous, Q6H PRN, Dank Robles MD    sodium chloride 0 9 % infusion, 100 mL/hr, Intravenous, Continuous, Dank Robles MD, Last Rate: 100 mL/hr at 04/26/22 0618, 100 mL/hr at 04/26/22 0618    tamsulosin (FLOMAX) capsule 0 4 mg, 0 4 mg, Oral, Daily With Dinner, Dank Robles MD    traZODone (DESYREL) tablet 150 mg, 150 mg, Oral, HS, Dank Robles MD    Blood Culture:   Lab Results   Component Value Date    BLOODCX Received in Microbiology Lab  Culture in Progress  04/25/2022       Wound Culture:   No results found for: WOUNDCULT    Ins and Outs:  I/O last 24 hours: In: 1438 3 [P O :240; I V :1198 3]  Out: 1500 [Urine:1500]          Physical Exam:   /78 (BP Location: Right arm)   Pulse 87   Temp 98 4 °F (36 9 °C) (Oral)   Resp (!) 118   Wt 87 kg (191 lb 12 8 oz)   SpO2 96%   BMI 26 01 kg/m²   Gen: Alert and oriented to person, place, time  HEENT: EOMI, eyes clear, moist mucus membranes, hearing intact  Respiratory: Bilateral chest rise  No audible wheezing found  Cardiovascular: Regular Rate and Rhythm  Abdomen: soft nontender/nondistended    Musculoskeletal: left upper extremity  · Patient currently lying in bed, teary, but in no acute distress  He currently has sling in place on the left upper extremity  · There is mild residual swelling along the hand and fingers  Skin without lesions, effusion, ecchymosis, erythema, warmth, or other signs of infection  · Sensation motor intact along the radial, median, ulnar nerve distributions  · Motor intact ain/pin/m/r/u  · Intact wrist range of motion without pain  · 2+ rad pulse  · Brisk cap refill in all fingers  · 5/5  strength    Tertiary: no tenderness over all other joints/long bones as except already stated  Radiology:   I personally reviewed the films    X-rays of the left shoulder taken on 04/25/2022 demonstrate left humerus surgical neck fracture with early signs of interval healing, unchanged alignment compared to previous imaging    _*_*_*_*_*_*_*_*_*_*_*_*_*_*_*_*_*_*_*_*_*_*_*_*_*_*_*_*_*_*_*_*_*_*_*_*_*_*_*_*_*    Assessment:  62 y o male with a left humeral surgical neck fracture, now 3 weeks out from initial injury; acute metabolic encephalopathy; subacute fracture of multiple ribs of left side; chronic back pain    Plan:   · Patient may not lift more than 1 lb with the left upper extremity  · PT/OT pendulum exercises, active assisted range of motion, below shoulder height left upper extremity  · Pain control per primary team  · DVT ppx per primary team  · Body mass index is 26 01 kg/m²  · Dispo:  · The patient may begin working with physical therapy as noted above, restrictions as noted above  Sling as needed for comfort    · He will continue to follow-up with Dr Denita Larkin as outpatient in 2-3 weeks for recheck and repeat x-rays of the left shoulder    Misbah Sheikh PA-C

## 2022-04-26 NOTE — PLAN OF CARE
Problem: PHYSICAL THERAPY ADULT  Goal: Performs mobility at highest level of function for planned discharge setting  See evaluation for individualized goals  Description: Treatment/Interventions: ADL retraining,Functional transfer training,LE strengthening/ROM,Elevations,Therapeutic exercise,Cognitive reorientation,Endurance training,Patient/family training,Equipment eval/education,Bed mobility,Gait training,Compensatory technique education,Spoke to nursing  Equipment Recommended:  (pt has cane)       See flowsheet documentation for full assessment, interventions and recommendations  Note: Prognosis: Good  Problem List: Decreased strength,Decreased endurance,Impaired balance,Decreased mobility,Decreased cognition,Impaired judgement,Decreased safety awareness,Decreased skin integrity,Pain  Assessment: Pt is a 63 y o  male seen for PT evaluation s/p admission to [address] [place] [address] on 4/25/2022 with Acute metabolic encephalopathy  Order placed for PT services  Upon evaluation: Pt is presenting with impaired functional mobility due to pain, decreased strength, decreased endurance, impaired balance, gait deviations, impaired cognition, decreased safety awareness, impaired judgment, orthopedic restrictions, fall risk and impaired skin integrity requiring supervision assistance for bed mobility, steadying to stand by assistance for transfers and steadying to stand by assistance for ambulation with spc   Pt's clinical presentation is currently unpredictable given the functional mobility deficits above, especially weakness, decreased skin integrity, decreased endurance, gait deviations, pain, decreased activity tolerance, decreased functional mobility tolerance, decreased safety awareness, impaired judgement, decreased cognition and orthopedic restrictions, coupled with fall risks as indicated by AM-PAC 6-Clicks: 02/66 as well as hx of falls, impulsivity, impaired balance, polypharmacy, impaired judgement, decreased safety awareness and decreased cognition and combined with medical complications of pain impacting overall mobility status, abnormal H&H, impulsivity during admission, need for input for mobility technique/safety and acute metabolic encephalopathy  Pt's PMHx and comorbidities that may affect physical performance and progress include: recent left humeral fx, closed fx of multiple ribs of left side (6-9), bipolar affective disorder, chronic back pain, narcotic dependence  Personal factors affecting pt at time of IE include: limited home support, inability to perform IADLs, inability to perform ADLs, inability to navigate level surfaces without external assistance, inability to ambulate household distances and recent fall(s)/fall history  Pt will benefit from continued skilled PT services to address deficits as defined above and to maximize level of functional mobility to facilitate return toward PLOF and improved QOL  From PT/mobility standpoint, recommendation at time of d/c would be Home PT, with cane and home with increased support pending progress in order to reduce fall risk and maximize pt's functional independence and consistency with mobility in order to facilitate return to PLOF  Recommend ther ex next 1-2 sessions  Barriers to Discharge: Decreased caregiver support  Barriers to Discharge Comments: lives alone  would beenfit from increased support of family, community     PT Discharge Recommendation: Home with home health rehabilitation          See flowsheet documentation for full assessment

## 2022-04-27 ENCOUNTER — APPOINTMENT (INPATIENT)
Dept: MRI IMAGING | Facility: HOSPITAL | Age: 62
DRG: 091 | End: 2022-04-27
Payer: MEDICARE

## 2022-04-27 PROBLEM — L89.153 SACRAL DECUBITUS ULCER, STAGE III (HCC): Status: ACTIVE | Noted: 2022-04-27

## 2022-04-27 PROBLEM — F11.20 OPIOID DEPENDENCE (HCC): Status: ACTIVE | Noted: 2022-04-27

## 2022-04-27 PROBLEM — R58 ECCHYMOSIS: Status: ACTIVE | Noted: 2022-04-27

## 2022-04-27 PROBLEM — G92.9 TOXIC ENCEPHALOPATHY: Status: ACTIVE | Noted: 2022-04-25

## 2022-04-27 PROCEDURE — 87205 SMEAR GRAM STAIN: CPT | Performed by: FAMILY MEDICINE

## 2022-04-27 PROCEDURE — 87186 SC STD MICRODIL/AGAR DIL: CPT | Performed by: FAMILY MEDICINE

## 2022-04-27 PROCEDURE — 70551 MRI BRAIN STEM W/O DYE: CPT

## 2022-04-27 PROCEDURE — G1004 CDSM NDSC: HCPCS

## 2022-04-27 PROCEDURE — 99233 SBSQ HOSP IP/OBS HIGH 50: CPT | Performed by: FAMILY MEDICINE

## 2022-04-27 PROCEDURE — 87077 CULTURE AEROBIC IDENTIFY: CPT | Performed by: FAMILY MEDICINE

## 2022-04-27 PROCEDURE — 87070 CULTURE OTHR SPECIMN AEROBIC: CPT | Performed by: FAMILY MEDICINE

## 2022-04-27 RX ORDER — IBUPROFEN 600 MG/1
600 TABLET ORAL EVERY 6 HOURS PRN
Status: DISCONTINUED | OUTPATIENT
Start: 2022-04-27 | End: 2022-05-09 | Stop reason: HOSPADM

## 2022-04-27 RX ADMIN — HEPARIN SODIUM 5000 UNITS: 5000 INJECTION INTRAVENOUS; SUBCUTANEOUS at 06:15

## 2022-04-27 RX ADMIN — IBUPROFEN 600 MG: 600 TABLET ORAL at 16:48

## 2022-04-27 RX ADMIN — ACETAMINOPHEN 650 MG: 325 TABLET ORAL at 01:24

## 2022-04-27 RX ADMIN — BUPRENORPHINE AND NALOXONE 8 MG: 8; 2 FILM BUCCAL; SUBLINGUAL at 08:35

## 2022-04-27 RX ADMIN — TRAZODONE HYDROCHLORIDE 150 MG: 100 TABLET ORAL at 21:51

## 2022-04-27 RX ADMIN — NICOTINE 7 MG/24 HR DAILY TRANSDERMAL PATCH 1 PATCH: at 08:34

## 2022-04-27 RX ADMIN — DIAZEPAM 5 MG: 5 TABLET ORAL at 16:48

## 2022-04-27 RX ADMIN — TAMSULOSIN HYDROCHLORIDE 0.4 MG: 0.4 CAPSULE ORAL at 16:10

## 2022-04-27 RX ADMIN — BUPRENORPHINE AND NALOXONE 8 MG: 8; 2 FILM BUCCAL; SUBLINGUAL at 21:51

## 2022-04-27 RX ADMIN — SODIUM CHLORIDE 100 ML/HR: 0.9 INJECTION, SOLUTION INTRAVENOUS at 13:31

## 2022-04-27 RX ADMIN — SODIUM CHLORIDE 100 ML/HR: 0.9 INJECTION, SOLUTION INTRAVENOUS at 01:00

## 2022-04-27 RX ADMIN — ATORVASTATIN CALCIUM 40 MG: 40 TABLET, FILM COATED ORAL at 16:10

## 2022-04-27 RX ADMIN — LURASIDONE HYDROCHLORIDE 20 MG: 20 TABLET, FILM COATED ORAL at 21:51

## 2022-04-27 RX ADMIN — BUPRENORPHINE AND NALOXONE 8 MG: 8; 2 FILM BUCCAL; SUBLINGUAL at 16:10

## 2022-04-27 RX ADMIN — ASPIRIN 81 MG CHEWABLE TABLET 81 MG: 81 TABLET CHEWABLE at 08:35

## 2022-04-27 RX ADMIN — LIDOCAINE 5% 1 PATCH: 700 PATCH TOPICAL at 08:35

## 2022-04-27 RX ADMIN — DULOXETINE HYDROCHLORIDE 120 MG: 60 CAPSULE, DELAYED RELEASE ORAL at 08:35

## 2022-04-27 RX ADMIN — IBUPROFEN 600 MG: 600 TABLET ORAL at 10:47

## 2022-04-27 NOTE — PROGRESS NOTES
[address]  Progress Note - Silvia Benítez 1960, 63 y o  male MRN: [id number]  Unit/Bed#: MS [facility] Encounter: [id number]  Primary Care Provider: Andre Alcaraz MD   Date and time admitted to hospital: 4/25/2022 10:18 AM    * Toxic encephalopathy  Assessment & Plan  Most likely secondary to Suboxone and Valium  Patient noted to have worsening confusion, yelling for help,  Found on the floor by neighbors  Found to be incontinent of stool  Currently in the ED he is having episodes of somnolence however is arousable for few minutes and then he drifts back to sleep  Urine drug screen is positive for benzos  Patient is chronically on Valium 10 mg 3 times daily and also on Suboxone 8/2 mg 1 sublingual film 3 times daily  Unclear whether he was abusing medications are withdrawing from medications  CT brain and CTa head and neck reviewed  No acute pathology noted  Continue neuro checks for now   Resume home medications  unless he becomes more awake later tonight and wants to take his Valium and Suboxone and that would be okay  Differential at this time includes drug withdrawal versus intoxication, seizure, less likely stroke  Pending MRI of the brain, pending Neurology consult      Sacral decubitus ulcer, stage III ([facility] [address] )  Assessment & Plan          Continue wound care  Follow wound culture  Will hold off antibiotics at this time    Ecchymosis  Assessment & Plan  Affecting left flank, left lateral chest, left upper thigh-hemoglobin is slowly trending down, will hold heparin at this time, and monitor    Opioid dependence [facility])  Assessment & Plan  chronic back pain, evidenced by daily use of Suboxone 8mg three times a day, requiring continued regime in hospital        Left humeral fracture  Assessment & Plan  Patient had left humeral fracture diagnosed on 04/04/2022 on an ED visit  Still present on imaging      · Orthopedic recommendation appreciated:Patient may not lift more than 1 lb with the left upper extremity  · PT/OT pendulum exercises, active assisted range of motion, below shoulder height left upper extremity      Closed fracture of multiple ribs of left side with routine healing  Assessment & Plan  CT chest and pelvis shows There are fractures of the left 6, 7th, 8th, and 9th ribs with reactive bone formation present suggesting that these fractures are subacute or less likely chronic  Continue pain control  These do not appear to be acute fractures and hence continue supportive care    Back pain  Assessment & Plan  Patient is complaining of chronic back pain  Continue Suboxone as per home regimen starting tomorrow  Patient is seen by addition clinic [facility]  Note reviewed  He is supposed to be on 08/02 mg 1 film sublingual t i d  if patient is more awake later today he may receive a dose tonight as well    Other hyperlipidemia  Assessment & Plan  Continue statin therapy    Bipolar affective disorder ([facility] [address] )  Assessment & Plan  Continue home regimen of Valium, Cymbalta and Latuda  Follow psychiatry consult      VTE Pharmacologic Prophylaxis: VTE Score: 2 Patient was on heparin, remain on hold due to ecchymosis and trending down hemoglobin    Patient Centered Rounds: I performed bedside rounds with nursing staff today  Discussions with Specialists or Other Care Team Provider:  None    Education and Discussions with Family / Patient: Updated  (son) via phone  Time Spent for Care: 20 minutes  More than 50% of total time spent on counseling and coordination of care as described above  Current Length of Stay: 2 day(s)  Current Patient Status: Inpatient   Certification Statement: The patient will continue to require additional inpatient hospital stay due to To monitor above condition  Discharge Plan: To be determined    Code Status: Level 1 - Full Code    Subjective:   Seen and evaluated during the round  Patient resting comfortably  Walking with assistance  Still having depression, but denies any suicidal homicidal ideation  Still complaining of pain in multiple sites of the body  Objective:     Vitals:   Temp (24hrs), Av 8 °F (36 6 °C), Min:97 6 °F (36 4 °C), Max:98 °F (36 7 °C)    Temp:  [97 6 °F (36 4 °C)-98 °F (36 7 °C)] 97 8 °F (36 6 °C)  HR:  [60-89] 89  Resp:  [18-20] 19  BP: (114-135)/(63-79) 123/63  SpO2:  [94 %-97 %] 97 %  Body mass index is 26 01 kg/m²  Input and Output Summary (last 24 hours): Intake/Output Summary (Last 24 hours) at 2022 1239  Last data filed at 2022 1013  Gross per 24 hour   Intake 2721 67 ml   Output 1000 ml   Net 1721 67 ml       Physical Exam:   Physical Exam  Vitals and nursing note reviewed  Constitutional:       Appearance: He is not diaphoretic  HENT:      Head: Normocephalic  Nose: Nose normal  No congestion  Mouth/Throat:      Mouth: Mucous membranes are moist       Pharynx: No oropharyngeal exudate or posterior oropharyngeal erythema  Eyes:      General: No scleral icterus  Conjunctiva/sclera: Conjunctivae normal       Pupils: Pupils are equal, round, and reactive to light  Neck:      Vascular: No carotid bruit  Cardiovascular:      Rate and Rhythm: Normal rate  Heart sounds: No murmur heard  No friction rub  No gallop  Pulmonary:      Effort: Pulmonary effort is normal  No respiratory distress  Breath sounds: No stridor  No wheezing or rhonchi  Abdominal:      General: Abdomen is flat  Bowel sounds are normal  There is no distension  Palpations: There is no mass  Tenderness: There is no abdominal tenderness  Hernia: No hernia is present  Musculoskeletal:         General: Tenderness (Multiple sites of the body) present  Cervical back: Normal range of motion  No tenderness  Comments: Left upper extremity is in sling  Well-healed scar leida noted on lower back   Lymphadenopathy:      Cervical: No cervical adenopathy     Skin:     General: Skin is warm  Comments: Ecchymosis affecting left lateral chest, left flank and left upper extremity   Neurological:      Mental Status: He is alert and oriented to person, place, and time  Cranial Nerves: No cranial nerve deficit  Sensory: No sensory deficit  Motor: No weakness  Coordination: Coordination normal       Comments: Patient is alert and answering questions-some of the questions, patient takes longer time to give answer,         Additional Data:     Labs:  Results from last 7 days   Lab Units 04/26/22  0430   WBC Thousand/uL 6 22   HEMOGLOBIN g/dL 10 7*   HEMATOCRIT % 32 2*   PLATELETS Thousands/uL 342     Results from last 7 days   Lab Units 04/26/22  0430   SODIUM mmol/L 139   POTASSIUM mmol/L 3 5   CHLORIDE mmol/L 105   CO2 mmol/L 26   BUN mg/dL 11   CREATININE mg/dL 0 67   ANION GAP mmol/L 8   CALCIUM mg/dL 8 1*   ALBUMIN g/dL 2 9*   TOTAL BILIRUBIN mg/dL 0 48   ALK PHOS U/L 96   ALT U/L 21   AST U/L 17   GLUCOSE RANDOM mg/dL 100     Results from last 7 days   Lab Units 04/25/22  1047   INR  0 98     Results from last 7 days   Lab Units 04/25/22  1044   POC GLUCOSE mg/dl 129     Results from last 7 days   Lab Units 04/26/22  0430   HEMOGLOBIN A1C % 5 0     Results from last 7 days   Lab Units 04/25/22  1046   LACTIC ACID mmol/L 0 7       Lines/Drains:  Invasive Devices  Report    Peripheral Intravenous Line            Peripheral IV 04/25/22 Right Antecubital 2 days    Peripheral IV 04/26/22 Right Wrist <1 day                      Imaging: No pertinent imaging reviewed  Recent Cultures (last 7 days):   Results from last 7 days   Lab Units 04/25/22  1121 04/25/22  1053   BLOOD CULTURE  No Growth at 24 hrs  No Growth at 24 hrs         Last 24 Hours Medication List:   Current Facility-Administered Medications   Medication Dose Route Frequency Provider Last Rate    acetaminophen  650 mg Oral Q6H PRN Josee Schneider MD      aspirin  81 mg Oral Daily MD Jan Morrow atorvastatin  40 mg Oral QPM Nathan King MD      buprenorphine-naloxone  8 mg Sublingual TID Nathan King MD      calcium carbonate  1,000 mg Oral Daily PRN Nathan King MD      diazepam  5 mg Oral Q8H PRN Nathan King MD      DULoxetine  120 mg Oral Daily Nathan King MD      ibuprofen  600 mg Oral Q6H PRN Damaso Pan MD      lidocaine  1 patch Topical Daily Damaso Pan MD      lurasidone  20 mg Oral HS Nathan King MD      nicotine  1 patch Transdermal Daily Nathan King MD      ondansetron  4 mg Intravenous Q6H PRN Nathan King MD      sodium chloride  100 mL/hr Intravenous Continuous Nathan King  mL/hr (04/27/22 0100)    tamsulosin  0 4 mg Oral Daily With Meg Singleton MD      traZODone  150 mg Oral HS Nathan King MD          Today, Patient Was Seen By: Damaso Pan MD    **Please Note: This note may have been constructed using a voice recognition system  **

## 2022-04-27 NOTE — ASSESSMENT & PLAN NOTE
Affecting left flank, left lateral chest, left upper thigh-hemoglobin is slowly trending down, will hold heparin at this time, and monitor

## 2022-04-27 NOTE — ASSESSMENT & PLAN NOTE
Patient is complaining of chronic back pain  Continue Suboxone as per home regimen starting tomorrow  Patient is seen by addition clinic [facility]  Note reviewed    He is supposed to be on 08/02 mg 1 film sublingual t i d  if patient is more awake later today he may receive a dose tonight as well

## 2022-04-27 NOTE — CASE MANAGEMENT
Case Management Discharge Planning Note    Patient name Kike Service  Location [id number] MRN [id number]  : 1960 Date 2022       Current Admission Date: 2022  Current Admission Diagnosis:Toxic encephalopathy   Patient Active Problem List    Diagnosis Date Noted    Opioid dependence ([facility] ) 2022    Ecchymosis 2022    Sacral decubitus ulcer, stage III ([facility] ) 2022    Back pain 2022    Toxic encephalopathy 2022    Closed fracture of multiple ribs of left side with routine healing 2022    Left humeral fracture 2022    Acute pain of left shoulder 2022    Closed 2-part displaced fracture of surgical neck of left humerus 2022    Other hyperlipidemia 2022    Memory loss 2022    Closed fracture of head of left humerus 2022    Class 1 obesity due to excess calories without serious comorbidity with body mass index (BMI) of 30 0 to 30 9 in adult 03/10/2016    Bipolar affective disorder ([facility] ) 2013      LOS (days): 2  Geometric Mean LOS (GMLOS) (days): 4 20  Days to GMLOS:2 1     OBJECTIVE:  Risk of Unplanned Readmission Score: 10         Current admission status: Inpatient   Preferred Pharmacy:   [facility] [id number] [address] [address] [facility] [address]  Phone: [phone] Fax: [phone]    Primary Care Provider: David Macias MD    Primary Insurance: MEDICARE  Secondary Insurance: [address] [place] [address] Floor DETAILS:     Patient discussed in grace , jb dc 48 hrs   Neurp phych referral and MRI pending    PT/OT recommendations home with home health    A post acute care recommendation was made by your care team for [address]  Discussed Freedom of Choice with patient  List of agencies given to patient via in person  patient aware the list is custom filtered for them by preference  and that [facility] post acute providers are designated  Patient stated his preference is [address] [place] [address] health,  Declined [facility] home health  Patient stated he wants to get stronger and do this right but at home  Patient also asked for his son to be updated before decisions are made  And to keep patient in the loop             cm placed referral to [address] [place] [address] on ecin         CM to follow

## 2022-04-27 NOTE — PLAN OF CARE
Problem: MOBILITY - ADULT  Goal: Maintain or return to baseline ADL function  Description: INTERVENTIONS:  -  Assess patient's ability to carry out ADLs; assess patient's baseline for ADL function and identify physical deficits which impact ability to perform ADLs (bathing, care of mouth/teeth, toileting, grooming, dressing, etc )  - Assess/evaluate cause of self-care deficits   - Assess range of motion  - Assess patient's mobility; develop plan if impaired  - Assess patient's need for assistive devices and provide as appropriate  - Encourage maximum independence but intervene and supervise when necessary  - Involve family in performance of ADLs  - Assess for home care needs following discharge   - Consider OT consult to assist with ADL evaluation and planning for discharge  - Provide patient education as appropriate  Outcome: Progressing  Goal: Maintains/Returns to pre admission functional level  Description: INTERVENTIONS:  - Perform BMAT or MOVE assessment daily    - Set and communicate daily mobility goal to care team and patient/family/caregiver  - Collaborate with rehabilitation services on mobility goals if consulted  - Perform Range of Motion   times a day  - Reposition patient every   hours  - Dangle patient   times a day  - Stand patient   times a day  - Ambulate patient   times a day  - Out of bed to chair   times a day   - Out of bed for meals    times a day  - Out of bed for toileting  - Record patient progress and toleration of activity level   Outcome: Progressing     Problem: PAIN - ADULT  Goal: Verbalizes/displays adequate comfort level or baseline comfort level  Description: Interventions:  - Encourage patient to monitor pain and request assistance  - Assess pain using appropriate pain scale  - Administer analgesics based on type and severity of pain and evaluate response  - Implement non-pharmacological measures as appropriate and evaluate response  - Consider cultural and social influences on pain and pain management  - Notify physician/advanced practitioner if interventions unsuccessful or patient reports new pain  Outcome: Progressing     Problem: INFECTION - ADULT  Goal: Absence or prevention of progression during hospitalization  Description: INTERVENTIONS:  - Assess and monitor for signs and symptoms of infection  - Monitor lab/diagnostic results  - Monitor all insertion sites, i e  indwelling lines, tubes, and drains  - Monitor endotracheal if appropriate and nasal secretions for changes in amount and color  - Peck appropriate cooling/warming therapies per order  - Administer medications as ordered  - Instruct and encourage patient and family to use good hand hygiene technique  - Identify and instruct in appropriate isolation precautions for identified infection/condition  Outcome: Progressing  Goal: Absence of fever/infection during neutropenic period  Description: INTERVENTIONS:  - Monitor WBC    Outcome: Progressing     Problem: DISCHARGE PLANNING  Goal: Discharge to home or other facility with appropriate resources  Description: INTERVENTIONS:  - Identify barriers to discharge w/patient and caregiver  - Arrange for needed discharge resources and transportation as appropriate  - Identify discharge learning needs (meds, wound care, etc )  - Arrange for interpretive services to assist at discharge as needed  - Refer to Case Management Department for coordinating discharge planning if the patient needs post-hospital services based on physician/advanced practitioner order or complex needs related to functional status, cognitive ability, or social support system  Outcome: Progressing     Problem: Knowledge Deficit  Goal: Patient/family/caregiver demonstrates understanding of disease process, treatment plan, medications, and discharge instructions  Description: Complete learning assessment and assess knowledge base    Interventions:  - Provide teaching at level of understanding  - Provide teaching via preferred learning methods  Outcome: Progressing     Problem: NEUROSENSORY - ADULT  Goal: Achieves stable or improved neurological status  Description: INTERVENTIONS  - Monitor and report changes in neurological status  - Monitor vital signs such as temperature, blood pressure, glucose, and any other labs ordered   - Initiate measures to prevent increased intracranial pressure  - Monitor for seizure activity and implement precautions if appropriate      Outcome: Progressing  Goal: Remains free of injury related to seizures activity  Description: INTERVENTIONS  - Maintain airway, patient safety  and administer oxygen as ordered  - Monitor patient for seizure activity, document and report duration and description of seizure to physician/advanced practitioner  - If seizure occurs,  ensure patient safety during seizure  - Reorient patient post seizure  - Seizure pads on all 4 side rails  - Instruct patient/family to notify RN of any seizure activity including if an aura is experienced  - Instruct patient/family to call for assistance with activity based on nursing assessment  - Administer anti-seizure medications if ordered    Outcome: Progressing  Goal: Achieves maximal functionality and self care  Description: INTERVENTIONS  - Monitor swallowing and airway patency with patient fatigue and changes in neurological status  - Encourage and assist patient to increase activity and self care     - Encourage visually impaired, hearing impaired and aphasic patients to use assistive/communication devices  Outcome: Progressing     Problem: MUSCULOSKELETAL - ADULT  Goal: Maintain or return mobility to safest level of function  Description: INTERVENTIONS:  - Assess patient's ability to carry out ADLs; assess patient's baseline for ADL function and identify physical deficits which impact ability to perform ADLs (bathing, care of mouth/teeth, toileting, grooming, dressing, etc )  - Assess/evaluate cause of self-care deficits   - Assess range of motion  - Assess patient's mobility  - Assess patient's need for assistive devices and provide as appropriate  - Encourage maximum independence but intervene and supervise when necessary  - Involve family in performance of ADLs  - Assess for home care needs following discharge   - Consider OT consult to assist with ADL evaluation and planning for discharge  - Provide patient education as appropriate  Outcome: Progressing  Goal: Maintain proper alignment of affected body part  Description: INTERVENTIONS:  - Support, maintain and protect limb and body alignment  - Provide patient/ family with appropriate education  Outcome: Progressing     Problem: Neurological Deficit  Goal: Neurological status is stable or improving  Description: Interventions:  - Monitor and assess patient's level of consciousness, motor function, sensory function, and level of assistance needed for ADLs  - Monitor and report changes from baseline  Collaborate with interdisciplinary team to initiate plan and implement interventions as ordered  - Provide and maintain a safe environment  - Consider seizure precautions  - Consider fall precautions  - Consider aspiration precautions  - Consider bleeding precautions  Outcome: Progressing     Problem: Activity Intolerance/Impaired Mobility  Goal: Mobility/activity is maintained at optimum level for patient  Description: Interventions:  - Assess and monitor patient  barriers to mobility and need for assistive/adaptive devices  - Assess patient's emotional response to limitations  - Collaborate with interdisciplinary team and initiate plans and interventions as ordered  - Encourage independent activity per ability   - Maintain proper body alignment  - Perform active/passive rom as tolerated/ordered    - Plan activities to conserve energy   - Turn patient as appropriate  Outcome: Progressing     Problem: Communication Impairment  Goal: Ability to express needs and understand communication  Description: Assess patient's communication skills and ability to understand information  Patient will demonstrate use of effective communication techniques, alternative methods of communication and understanding even if not able to speak  - Encourage communication and provide alternate methods of communication as needed  - Collaborate with case management/ for discharge needs  - Include patient/family/caregiver in decisions related to communication  Outcome: Progressing     Problem: Potential for Aspiration  Goal: Non-ventilated patient's risk of aspiration is minimized  Description: Assess and monitor vital signs, respiratory status, and labs (WBC)  Monitor for signs of aspiration (tachypnea, cough, rales, wheezing, cyanosis, fever)  - Assess and monitor patient's ability to swallow  - Place patient up in chair to eat if possible  - HOB up at 90 degrees to eat if unable to get patient up into chair   - Supervise patient during oral intake  - Instruct patient/ family to take small bites  - Instruct patient/ family to take small single sips when taking liquids  - Follow patient-specific strategies generated by speech pathologist   Outcome: Progressing  Goal: Ventilated patient's risk of aspiration is minimized  Description: Assess and monitor vital signs, respiratory status, airway cuff pressure, and labs (WBC)  Monitor for signs of aspiration (tachypnea, cough, rales, wheezing, cyanosis, fever)  - Elevate head of bed 30 degrees if patient has tube feeding   - Monitor tube feeding  Outcome: Progressing     Problem: Nutrition  Goal: Nutrition/Hydration status is improving  Description: Monitor and assess patient's nutrition/hydration status for malnutrition (ex- brittle hair, bruises, dry skin, pale skin and conjunctiva, muscle wasting, smooth red tongue, and disorientation)   Collaborate with interdisciplinary team and initiate plan and interventions as ordered  Monitor patient's weight and dietary intake as ordered or per policy  Utilize nutrition screening tool and intervene per policy  Determine patient's food preferences and provide high-protein, high-caloric foods as appropriate  - Assist patient with eating   - Allow adequate time for meals   - Encourage patient to take dietary supplement as ordered  - Collaborate with clinical nutritionist   - Include patient/family/caregiver in decisions related to nutrition  Outcome: Progressing     Problem: Prexisting or High Potential for Compromised Skin Integrity  Goal: Skin integrity is maintained or improved  Description: INTERVENTIONS:  - Identify patients at risk for skin breakdown  - Assess and monitor skin integrity  - Assess and monitor nutrition and hydration status  - Monitor labs   - Assess for incontinence   - Turn and reposition patient  - Assist with mobility/ambulation  - Relieve pressure over bony prominences  - Avoid friction and shearing  - Provide appropriate hygiene as needed including keeping skin clean and dry  - Evaluate need for skin moisturizer/barrier cream  - Collaborate with interdisciplinary team   - Patient/family teaching  - Consider wound care consult   Outcome: Progressing     Problem: Potential for Falls  Goal: Patient will remain free of falls  Description: INTERVENTIONS:  - Educate patient/family on patient safety including physical limitations  - Instruct patient to call for assistance with activity   - Consult OT/PT to assist with strengthening/mobility   - Keep Call bell within reach  - Keep bed low and locked with side rails adjusted as appropriate  - Keep care items and personal belongings within reach  - Initiate and maintain comfort rounds  - Make Fall Risk Sign visible to staff  - Offer Toileting every   Hours, in advance of need  - Initiate/Maintain   alarm  - Obtain necessary fall risk management equipment:     - Apply yellow socks and bracelet for high fall risk patients  - Consider moving patient to room near nurses station  Outcome: Progressing     Problem: Nutrition/Hydration-ADULT  Goal: Nutrient/Hydration intake appropriate for improving, restoring or maintaining nutritional needs  Description: Monitor and assess patient's nutrition/hydration status for malnutrition  Collaborate with interdisciplinary team and initiate plan and interventions as ordered  Monitor patient's weight and dietary intake as ordered or per policy  Utilize nutrition screening tool and intervene as necessary  Determine patient's food preferences and provide high-protein, high-caloric foods as appropriate       INTERVENTIONS:  - Monitor oral intake, urinary output, labs, and treatment plans  - Assess nutrition and hydration status and recommend course of action  - Evaluate amount of meals eaten  - Assist patient with eating if necessary   - Allow adequate time for meals  - Recommend/ encourage appropriate diets, oral nutritional supplements, and vitamin/mineral supplements  - Order, calculate, and assess calorie counts as needed  - Recommend, monitor, and adjust tube feedings and TPN/PPN based on assessed needs  - Assess need for intravenous fluids  - Provide specific nutrition/hydration education as appropriate  - Include patient/family/caregiver in decisions related to nutrition  Outcome: Progressing

## 2022-04-27 NOTE — WOUND OSTOMY CARE
Consult Note - Wound   Cristal Nair 63 y o  male MRN: [id number]  Unit/Bed#: -01 Encounter: [id number]        History and Present Illness:63year-old male from home via EMS with neighbors heard yelling, was found on floor incontinent of stool, confused  Records reviewed and has history of recent fall and left humerus fracture,patient poor historian unaware of events leading to fall  Seen today for initial wound consult and repeating conversation, confusion noted  Out of bed in recliner, able stand with minimal assist  Wearing adult brief for bladder management  Assessment Findings:   1)Bilateral heels intact  2)POA Left  buttocks wound presenting as a stage 3 wound with 20% tan slough and 10% brown adhered to wound bed, 70% wound bed is red with partial thickness skin loss  3)POA Right buttocks irregular shaped partial thickness skin loss with pink wound bed  No induration of wounds  Wounds may evolve to a full thickness skin loss as well as unstageable, stage 3 stage 4 due to patients unknown down time and unwitnessed  fall at home  Skin care plans:  1-Hydraguard to bilateralheels BID and PRN  2-Elevate heels to offload pressure  3-Ehob cushion in chair when out of bed  4-Moisturize skin daily with skin nourishing cream   5-Turn/reposition q2h or when medically stable for pressure re-distribution on skin  6-Cleanse right buttocks with saline, apply Dermagran wound then cover with ABD change daily and prn soil or dislodgment*  7-Cleanse left buttocks wound with normal saline,apply Maxorb Ag cut to size of wound bed, then cover with ABD pad, change daily and prn soil or dislodgment      Wounds:  Wound 04/25/22 Abrasion(s) Flank Right (Active)   Wound Description Intact;Pink;Light purple 04/27/22 0843   Anila-wound Assessment Clean;Dry; Intact; Purple 04/27/22 0843   Drainage Amount None 04/27/22 0843   Dressing Open to air 04/27/22 0843       Wound 04/25/22 Abrasion(s) Knee Anterior;Right (Active)   Wound Image   04/27/22 1110   Wound Description Dry; Intact; Beefy red 04/27/22 1110   Anila-wound Assessment Clean;Dry; Intact 04/27/22 1110   Wound Length (cm) 6 5 cm 04/27/22 1110   Wound Width (cm) 1 cm 04/27/22 1110   Wound Surface Area (cm^2) 6 5 cm^2 04/27/22 1110   Drainage Amount None 04/27/22 1110   Treatments Cleansed 04/27/22 1110   Dressing Open to air 04/27/22 1110   Patient Tolerance Tolerated well 04/27/22 1110       Wound 04/26/22 Buttocks Right (Active)   Wound Image   04/27/22 1114   Wound Description Beefy red;Fragile 04/27/22 1114   Anila-wound Assessment Clean;Dry; Intact 04/27/22 1114   Wound Length (cm) 3 cm 04/27/22 1114   Wound Width (cm) 4 cm 04/27/22 1114   Wound Surface Area (cm^2) 12 cm^2 04/27/22 1114   Drainage Amount None 04/27/22 1114   Drainage Description Serous 04/27/22 0843   Non-staged Wound Description Partial thickness 04/27/22 1114   Treatments Cleansed;Site care 04/27/22 1114   Dressing ABD; Dermagran gauze 04/27/22 1114   Dressing Changed Changed 04/27/22 1114   Patient Tolerance Tolerated well 04/27/22 1114   Dressing Status Clean;Dry; Intact 04/27/22 1114       Wound 04/26/22 Buttocks Left (Active)   Wound Image   04/27/22 1114   Wound Description Beefy red;Fragile;Slough; Yellow 04/27/22 1114   Pressure Injury Stage 3 04/27/22 1114   Anila-wound Assessment Clean;Dry; Intact 04/27/22 1114   Wound Length (cm) 4 cm 04/27/22 1114   Wound Width (cm) 5 cm 04/27/22 1114   Wound Surface Area (cm^2) 20 cm^2 04/27/22 1114   Drainage Amount Scant 04/27/22 1114   Drainage Description Clear; Tan 04/27/22 1114   Treatments Cleansed;Site care 04/27/22 1114   Dressing ABD;Calcium Alginate with Silver 04/27/22 1114   Dressing Changed Changed 04/27/22 1114   Patient Tolerance Tolerated well 04/27/22 1114   Dressing Status Clean;Dry; Intact 04/27/22 1114     Call or tigertext with any questions  Wound Care will continue to follow    [facility]

## 2022-04-27 NOTE — NURSING NOTE
Pt refused Incentive spirometry, stated "I will not be using that, don't even keep it in my room" pt was educated on the importance of it but continued to refuse

## 2022-04-27 NOTE — ASSESSMENT & PLAN NOTE
Most likely secondary to Suboxone and Valium  Patient noted to have worsening confusion, yelling for help,  Found on the floor by neighbors  Found to be incontinent of stool  Currently in the ED he is having episodes of somnolence however is arousable for few minutes and then he drifts back to sleep  Urine drug screen is positive for benzos  Patient is chronically on Valium 10 mg 3 times daily and also on Suboxone 8/2 mg 1 sublingual film 3 times daily  Unclear whether he was abusing medications are withdrawing from medications  CT brain and CTa head and neck reviewed  No acute pathology noted  Continue neuro checks for now   Resume home medications  unless he becomes more awake later tonight and wants to take his Valium and Suboxone and that would be okay    Differential at this time includes drug withdrawal versus intoxication, seizure, less likely stroke  Pending MRI of the brain, pending Neurology consult

## 2022-04-27 NOTE — ASSESSMENT & PLAN NOTE
chronic back pain, evidenced by daily use of Suboxone 8mg three times a day, requiring continued regime in hospital

## 2022-04-27 NOTE — TELEMEDICINE
TeleConsultation - Magen [age] y o  male MRN: [id number]  Unit/Bed#: -01 Encounter: [id number]        REQUIRED DOCUMENTATION:     1  This service was provided via Telemedicine  2  Provider located at [facility]   3  TeleMed provider: Tiffanie Araya MD   4  Identify all parties in room with patient during tele consult:  pt  5  Patient was then informed that this was a Telemedicine visit and that the exam was being conducted confidentially over secure lines  My office door was closed  No one else was in the room  Patient acknowledged consent and understanding of privacy and security of the Telemedicine visit, and gave us permission to have the assistant stay in the room in order to assist with the history and to conduct the exam   I informed the patient that I have reviewed their record in Epic and presented the opportunity for them to ask any questions regarding the visit today  The patient agreed to participate  Assessment/Plan     Assessment:  72-year-old male with history of bipolar disorder and opioid use disorder on Suboxone for inmates he who appears to be experiencing an overlay of encephalopathy/delirium of undetermined etiology  Plan:   Risks, benefits and possible side effects of Medications:   Risks, benefits, and possible side effects of medications explained to patient and patient verbalizes understanding  Recommend trial reduction of Cymbalta to 60 mg p o  every day while further evaluating and monitoring the course possible encephalopathy/delirium  Would continue other psychiatric medications as currently ordered  Re-consult Psychiatry as needed      Chief Complaint:  The patient states he has no psychiatric complaint    History of Present Illness     Reason for Consult / Principal Problem:  Bipolar disorder    Patient is a 63 y o  male recently evaluated by Neurology who felt patient may be possibly explains in toxic metabolic encephalopathy leading to his falls  The emergency department provider had documented the following upon patient's presentation their:  63year-old male from home via EMS with neighbors heard yelling, was found on floor incontinent of stool, confused  Records reviewed and has history of recent fall and left humerus fracture  Seen by orthopedist, was using left upper extremity immobilizer  Also history of substance abuse/opiate use  Patient is poor historian and believes he was up all night  Unaware of how wound upon ground, yelling  Currently denies chest pain and dyspnea  He denies abdominal pain  Notes he has left shoulder and left knee pain       Nursing reports varying levels of orientation and mental status staying typically the patient is only been oriented to person and place  Nursing reports patient has been tearful at times  Past psychiatric history:  The patient admits to seeing an outpatient psychiatrist   He had difficulty recalling what his diagnosis has been  He states he is on Suboxone because he got tired of taking all the different pain medications for his chronic pain  Social history:  The patient states he is single with 1 child and lives alone at this time  Family history:  Unremarkable per patient     Substance use history:  The patient states he does not consuming alcohol  He admits he has consumed some alcohol back in college  He denies use of other substances  He states some Suboxone because he got tired of taking so many pain medications for his chronic pain  Mental status examination:  Patient is alert and oriented to person and hospital   He was able to tell the name of the town the hospital is located and  He did not tell me name of the hospital   He did not know any aspect of the date  He frequently had to pause mid sentence in attempting to answer question and did derail at times  He was frequently at a loss for the answer to my questions  Affect is pleasant    He denies any psychiatric concerns  He reports euthymic mood  He denies suicidal homicidal ideation  He denies any hallucinations and other psychotic features  He appeared to have frequent word-finding difficulty  Vocabulary and sentence structure and syntax were consistent with average intellectual functioning  Insight and judgment are currently impaired by his cognitive dysfunction which appears consistent with delirium  Inpatient consult to Psychiatry  Consult performed by: Jacob Kasper MD  Consult ordered by: Tomy Proctor MD            Past Medical History:   Diagnosis Date    Narcotic dependence [facility])        Medical Review Of Systems:  Review of Systems    Meds/Allergies   all current active meds have been reviewed  No Known Allergies    Objective   Vital signs in last 24 hours:  Temp:  [97 6 °F (36 4 °C)-98 °F (36 7 °C)] 97 9 °F (36 6 °C)  HR:  [60-89] 88  Resp:  [18-19] 18  BP: (119-135)/(63-84) 128/84      Intake/Output Summary (Last 24 hours) at 4/27/2022 1601  Last data filed at 4/27/2022 1331  Gross per 24 hour   Intake 3829 67 ml   Output 1300 ml   Net 2529 67 ml         Lab Results: reviewed  Imaging Studies: reviewed  EKG, Pathology, and Other Studies: reviewed    Code Status: Level 1 - Full Code  Advance Directive and Living Will:      Power of :    POLST:      Counseling / Coordination of Care  Total floor / unit time spent today 30 minutes  Greater than 50% of total time was spent with the patient and / or family counseling and / or coordination of care  A description of the counseling / coordination of care:  Chart review, patient evaluation, coordination communication with staff, nursing and provider

## 2022-04-27 NOTE — ASSESSMENT & PLAN NOTE
Patient had left humeral fracture diagnosed on 04/04/2022 on an ED visit  Still present on imaging      · Orthopedic recommendation appreciated:Patient may not lift more than 1 lb with the left upper extremity  · PT/OT pendulum exercises, active assisted range of motion, below shoulder height left upper extremity

## 2022-04-28 LAB
BASOPHILS # BLD AUTO: 0.03 THOUSANDS/ΜL (ref 0–0.1)
BASOPHILS NFR BLD AUTO: 1 % (ref 0–1)
EOSINOPHIL # BLD AUTO: 0.44 THOUSAND/ΜL (ref 0–0.61)
EOSINOPHIL NFR BLD AUTO: 10 % (ref 0–6)
ERYTHROCYTE [DISTWIDTH] IN BLOOD BY AUTOMATED COUNT: 14.6 % (ref 11.6–15.1)
HCT VFR BLD AUTO: 27.5 % (ref 36.5–49.3)
HGB BLD-MCNC: 9.2 G/DL (ref 12–17)
IMM GRANULOCYTES # BLD AUTO: 0.01 THOUSAND/UL (ref 0–0.2)
IMM GRANULOCYTES NFR BLD AUTO: 0 % (ref 0–2)
LYMPHOCYTES # BLD AUTO: 0.93 THOUSANDS/ΜL (ref 0.6–4.47)
LYMPHOCYTES NFR BLD AUTO: 21 % (ref 14–44)
MCH RBC QN AUTO: 34.2 PG (ref 26.8–34.3)
MCHC RBC AUTO-ENTMCNC: 33.5 G/DL (ref 31.4–37.4)
MCV RBC AUTO: 102 FL (ref 82–98)
MONOCYTES # BLD AUTO: 0.32 THOUSAND/ΜL (ref 0.17–1.22)
MONOCYTES NFR BLD AUTO: 7 % (ref 4–12)
NEUTROPHILS # BLD AUTO: 2.64 THOUSANDS/ΜL (ref 1.85–7.62)
NEUTS SEG NFR BLD AUTO: 61 % (ref 43–75)
NRBC BLD AUTO-RTO: 0 /100 WBCS
PLATELET # BLD AUTO: 364 THOUSANDS/UL (ref 149–390)
PMV BLD AUTO: 9 FL (ref 8.9–12.7)
RBC # BLD AUTO: 2.69 MILLION/UL (ref 3.88–5.62)
WBC # BLD AUTO: 4.37 THOUSAND/UL (ref 4.31–10.16)

## 2022-04-28 PROCEDURE — 97116 GAIT TRAINING THERAPY: CPT

## 2022-04-28 PROCEDURE — 97167 OT EVAL HIGH COMPLEX 60 MIN: CPT

## 2022-04-28 PROCEDURE — 97110 THERAPEUTIC EXERCISES: CPT

## 2022-04-28 PROCEDURE — G0427 INPT/ED TELECONSULT70: HCPCS | Performed by: PSYCHIATRY & NEUROLOGY

## 2022-04-28 PROCEDURE — 99232 SBSQ HOSP IP/OBS MODERATE 35: CPT | Performed by: FAMILY MEDICINE

## 2022-04-28 PROCEDURE — 97530 THERAPEUTIC ACTIVITIES: CPT

## 2022-04-28 PROCEDURE — 85025 COMPLETE CBC W/AUTO DIFF WBC: CPT | Performed by: FAMILY MEDICINE

## 2022-04-28 RX ADMIN — SODIUM CHLORIDE 100 ML/HR: 0.9 INJECTION, SOLUTION INTRAVENOUS at 01:53

## 2022-04-28 RX ADMIN — BUPRENORPHINE AND NALOXONE 8 MG: 8; 2 FILM BUCCAL; SUBLINGUAL at 20:38

## 2022-04-28 RX ADMIN — TRAZODONE HYDROCHLORIDE 150 MG: 100 TABLET ORAL at 20:38

## 2022-04-28 RX ADMIN — NICOTINE 7 MG/24 HR DAILY TRANSDERMAL PATCH 1 PATCH: at 08:44

## 2022-04-28 RX ADMIN — ATORVASTATIN CALCIUM 40 MG: 40 TABLET, FILM COATED ORAL at 17:44

## 2022-04-28 RX ADMIN — ASPIRIN 81 MG CHEWABLE TABLET 81 MG: 81 TABLET CHEWABLE at 08:44

## 2022-04-28 RX ADMIN — TAMSULOSIN HYDROCHLORIDE 0.4 MG: 0.4 CAPSULE ORAL at 17:44

## 2022-04-28 RX ADMIN — DIAZEPAM 5 MG: 5 TABLET ORAL at 23:42

## 2022-04-28 RX ADMIN — IBUPROFEN 600 MG: 600 TABLET ORAL at 13:33

## 2022-04-28 RX ADMIN — BUPRENORPHINE AND NALOXONE 8 MG: 8; 2 FILM BUCCAL; SUBLINGUAL at 17:44

## 2022-04-28 RX ADMIN — BUPRENORPHINE AND NALOXONE 8 MG: 8; 2 FILM BUCCAL; SUBLINGUAL at 08:44

## 2022-04-28 RX ADMIN — DIAZEPAM 5 MG: 5 TABLET ORAL at 13:33

## 2022-04-28 RX ADMIN — DULOXETINE HYDROCHLORIDE 120 MG: 60 CAPSULE, DELAYED RELEASE ORAL at 08:44

## 2022-04-28 RX ADMIN — LIDOCAINE 5% 1 PATCH: 700 PATCH TOPICAL at 08:44

## 2022-04-28 RX ADMIN — LURASIDONE HYDROCHLORIDE 20 MG: 20 TABLET, FILM COATED ORAL at 22:45

## 2022-04-28 RX ADMIN — IBUPROFEN 600 MG: 600 TABLET ORAL at 23:42

## 2022-04-28 RX ADMIN — SODIUM CHLORIDE 100 ML/HR: 0.9 INJECTION, SOLUTION INTRAVENOUS at 20:40

## 2022-04-28 NOTE — NURSING NOTE
Pt more cooperative doing incentive spirometer  Pt educated on the importance of using  Pt able to reach 2000 on IS  Pt declining to get into bed at this time despite encouragement made by this nurse  Pt educated that pt has wound on buttocks and the importance of turning and repositioning frequently in order to offload buttocks  Pt stated "I cannot sleep in the bed"  Waffle cushion placed on recliner

## 2022-04-28 NOTE — PHYSICAL THERAPY NOTE
PHYSICAL THERAPY TREATMENT NOTE  NAME:  Greyson Deluca  DATE: 04/28/22    Length Of Stay: 3  Performed at least 2 patient identifiers during session: Name and ID bracelet    TREATMENT FLOWSHEET:    04/28/22 1424   PT Last Visit   PT Visit Date 04/28/22   Note Type   Note Type Treatment   Pain Assessment   Pain Assessment Tool 0-10   Pain Score No Pain   Restrictions/Precautions   Weight Bearing Precautions Per Order Yes   LUE Weight Bearing Per Order NWB   Braces or Orthoses Sling  (PRN for comfort)   Other Precautions Chair Alarm; Bed Alarm;Cognitive;Multiple lines; Fall Risk   General   Chart Reviewed Yes   Response to Previous Treatment Patient with no complaints from previous session  Family/Caregiver Present No   Cognition   Overall Cognitive Status Impaired   Arousal/Participation Alert; Cooperative   Attention Within functional limits   Orientation Level Oriented to person;Oriented to place   Memory Decreased recall of biographical information;Decreased recall of recent events   Following Commands Follows one step commands with increased time or repetition   Subjective   Subjective "I am a good tia "   Transfers   Sit to Stand 5  Supervision   Additional items Assist x 1; Armrests; Increased time required;Verbal cues   Stand to Sit 5  Supervision   Additional items Assist x 1; Armrests; Increased time required;Verbal cues   Stand pivot 5  Supervision   Additional items Assist x 1; Armrests; Increased time required;Verbal cues  [facility])   Toilet transfer 5  Supervision   Additional items Assist x 1; Increased time required;Verbal cues;Standard toilet  [facility])   Additional Comments Pt  was able to manage clothing and hygiene with min Ax1    Ambulation/Elevation   Gait pattern Improper Weight shift;Decreased foot clearance; Wide BRANDON; Excessively slow; Short stride  (deviates from straight path)   Gait Assistance   (SBA)   Additional items Assist x 1;Verbal cues   Assistive Device SPC   Distance 600 ft abd 15ftx2   Balance Static Sitting Good   Dynamic Sitting Fair +   Static Standing Fair   Dynamic Standing Fair -   Ambulatory Fair -   Endurance Deficit   Endurance Deficit No   Activity Tolerance   Activity Tolerance Patient tolerated treatment well   Nurse Made Aware RN aware   Exercises   Quad Sets Sitting;20 reps;AROM; Bilateral   Heelslides Sitting;20 reps;AROM; Bilateral   Glute Sets Sitting;20 reps;AROM; Bilateral   Hip Flexion Sitting;20 reps;AROM; Bilateral   Hip Abduction Sitting;20 reps;AROM; Bilateral   Hip Adduction Sitting;20 reps;AROM; Bilateral   Knee AROM Long Arc Quad Sitting;20 reps;AROM; Bilateral   Ankle Pumps Sitting;20 reps;AROM; Bilateral   Marching Sitting;20 reps;AROM; Bilateral   Assessment   Prognosis Good   Problem List Decreased strength;Decreased endurance; Impaired balance;Decreased mobility; Impaired judgement;Decreased safety awareness;Decreased skin integrity   Goals   Patient Goals to go home and read   PT Treatment Day 1   Plan   Treatment/Interventions Functional transfer training;LE strengthening/ROM; Elevations; Therapeutic exercise; Endurance training;Patient/family training;Equipment eval/education; Bed mobility;Gait training;Spoke to nursing   Progress Progressing toward goals   PT Frequency 3-5x/wk   Recommendation   PT Discharge Recommendation Home with home health rehabilitation   Equipment Recommended Cane  (Pt  reports brother took cane which he purchased from [employer])   [place] 8 in Bed Without Bedrails 4   Lying on Back to Sitting on Edge of Flat Bed 3   Moving Bed to Chair 3   Standing Up From Chair 4   Walk in Room 3   Climb 3-5 Stairs 3   Basic Mobility Inpatient Raw Score 20   Basic Mobility Standardized Score 43 99   Highest Level Of Mobility   [facility] Goal 6: Walk 10 steps or more       The patient's AM-PAC Basic Mobility Inpatient Short Form Raw Score is 20, Standardized Score is 43 99   A standardized score greater than 42 9 suggests the patient may benefit from discharge to home  Please also refer to the recommendation of the Physical Therapist for safe discharge planning  Pt seen for PT treatment session this date with interventions consisting of transfer training, toilet transfer, gait training, seated  TE, and education provided as needed for safety and direction to improve functional mobility and activity tolerance   Pt agreeable to PT treatment session upon arrival, pt found sitting OOB in recliner  At end of session, pt left sitting OOB in recliner with chair alarm activated, SCD's applied, and all needs in reach  In comparison to previous session, pt with improvements in ambulation distance  Continue to recommend Home PT at time of d/c in order to maximize pt's functional independence and safety w/ mobility  Pt continues to be functioning below baseline level  PT will continue to see pt while here in order to address the deficits listed above and provide interventions consistent w/ POC in effort to achieve STGs      Gorge James PTA

## 2022-04-28 NOTE — CONSULTS
TeleConsultation - Neurology   Sally Ortiz 63 y o  male MRN: [id number]   Encounter: [id number]      REQUIRED DOCUMENTATION:     1  This service was provided via Telemedicine  2  Provider located at [place], [place]  3  TeleMed provider: Yu Ferguson  4  Identify all parties in room with patient during tele consult:  Patient   5  After connecting through [employer], patient was identified by name and date of birth and assistant checked wristband  Patient was then informed that this was a Telemedicine visit and that the exam was being conducted confidentially over secure lines  My office door was closed  The patient was notified the following individuals were present in the room myself  Patient acknowledged consent and understanding of privacy and security of the Telemedicine visit, and gave us permission to have the assistant stay in the room in order to assist with the history and to conduct the exam   I informed the patient that I have reviewed their record in Epic and presented the opportunity for them to ask any questions regarding the visit today  The patient agreed to participate  Assessment/Plan   Assessment:  Suspect drug induced encephalopathy in the setting of possible neurodegenerative disorder, in a patient with opioid use disorder in the past- on suboxone now, and chronic gait dysfunction in the setting of multiple knee surgeries and back surgery   Pt came in with multiple falls and found down at home and confused  - UDS + BDZs however he is on valium at home, patient typically lives by himself  - Unclear if patient took more valium that his TID dosing on admit  - MRI brain with no new ischemia and empty sella noted which can be seen in the setting of radiographic intracranial hypertension   No evidence of IIH based on history and clinical exam today     (Clinically patient is a limited historian with recent events however is appropriate with his long term medical hx when I check in chart review  He denies any progressive vision changes- states he keeps close monitoring with eye doctor due to family hx macular degeneration and in recent visit no abnormalities noted  His exam today does not show any obvious VF loss or acuity loss however limited in the setting of tele visit )    - CBC with mild anemia- further work up per primary team   - No UTI, TSH, CK, CMP now, ammonia with no significant abnormalities other than low albumin and mild hypocalcemia  - BCx negative  - B12 low normal at 319  - Recommend OP neuropsych testing in 6-8 weeks from now once acute medical issues address and pt hopefully in less pain after numerous rib fractures healed  - Consider MRI brain neuroquant OP    Falls     Suspect multifactorial in the setting of chronic weakness with hx L spine surgery and knee surgery possibly medication induced a/e with valium use  Pt also reports not using cane as recommended as does not wish to   - Recommend therapies  - PT/OT  - Pt should not be driving    Pt should follow up with general neurology OP within 6-8 weeks    Plan:  Mr Greyson Deluca is a pleasant 63 y o with HLD, bipolar, chronic back pain currently on valium and narcotic dependence currently on Suboxone  Patient presents after being found by neighbors, confused, and on the ground at home  He was incontinent of stool and yelling out  Upon arrival to ED, he was having episodes of somnolence, however was arousable  BP normotensive  CTH/CTA head/neck negative for acute intracranial abnormality, significant stenosis of LVO  NIHSS 0  He was deemed not a tPA candidate due to being out of the window and non focal exam  Trauma work up revealed multiple subacute/chronic rib fractures  UDS was positive for benzo's  Patient was admitted on the stroke pathway  MRI brain was unremarkable for acute infarct, hemorrhage or mass       Etiology of presenting symptoms/fall is suspicious for acute toxic metabolic encephalopathy secondary to Suboxone and Valium intoxication vs drug withdrawel, less likely stroke given negative work up thus far  Plan:  - Continue Aspirin 81 mg and Lipitor 40 mg   - , Cholesterol 180, Hemoglobin A1c 5 0, TSH normal   - Maintain normotensive, euglycemic, normothermic goal  - Continue telemetry  - PT/OT/ST  - Frequent neuro checks  Continue to monitor and notify neurology with any changes   - STAT CT head for any acute change in neuro exam  - Medical management and supportive care per primary team  Correction of any metabolic or infectious disturbances  History of Present Illness     Reason for Consult / Principal Problem: Acute metabolic encephalopathy   Hx and PE limited by: Patient is poor historian   HPI: Greyson Deluca is a 63 y o  male with HLD, bipolar, chronic back pain currently on valium and narcotic dependence currently on Suboxone who presents after being found by neighbors confused and on the ground at home  He was incontinent of stool and yelling out  Upon arrival to ED, BP normotensive  CTH/CTA head/neck negative for acute intracranial abnormality, significant stenosis of LVO  NIHSS 0  He was deemed not a tPA candidate due to being out of the window and non focal exam  Trauma work up revealed multiple subacute/chronic rib fractures  UDS was positive for benzo's  Patient states that he may be confused but believes this to be age related  He cannot tell me if he took more medication or not    States he fell down and could not get up  States has not been using a cane as recommended as he does not wish to at this point in life  Denies any new weakness  Denies headaches  Denies vision changes    He is oriented to person and place but does not answer time or recall questions stating he is in too much pain and fatigued to think about these things  He initially referred to his son as his brother then corrected himself    He is able to tell me he is on suboxone  Also reports numerous knee surgeries and L spine surgery history and secondary chronic gait dysfunction and sensory reduction    Inpatient consult to Neurology  Consult performed by: Yousif Gu DO  Consult ordered by: Jose Antonio Burden MD          Review of Systems ROS reviewed    Historical Information   Past Medical History:   Diagnosis Date    Narcotic dependence [facility])      Past Surgical History:   Procedure Laterality Date    JOINT REPLACEMENT Bilateral     SPINAL FUSION       Social History   Social History     Substance and Sexual Activity   Alcohol Use Never     Social History     Substance and Sexual Activity   Drug Use Never     Social History     Tobacco Use   Smoking Status Current Every Day Smoker    Packs/day: 0 25    Years: 20 00    Pack years: 5 00    Types: Cigarettes   Smokeless Tobacco Never Used     Family History:   Family History   Problem Relation Age of Onset    Arthritis Mother        Review of previous medical records was completed       Meds/Allergies   all current active meds have been reviewed, current meds:   Current Facility-Administered Medications   Medication Dose Route Frequency    acetaminophen (TYLENOL) tablet 650 mg  650 mg Oral Q6H PRN    aspirin chewable tablet 81 mg  81 mg Oral Daily    atorvastatin (LIPITOR) tablet 40 mg  40 mg Oral QPM    buprenorphine-naloxone (Suboxone) film 8 mg  8 mg Sublingual TID    calcium carbonate (TUMS) chewable tablet 1,000 mg  1,000 mg Oral Daily PRN    diazepam (VALIUM) tablet 5 mg  5 mg Oral Q8H PRN    DULoxetine (CYMBALTA) delayed release capsule 120 mg  120 mg Oral Daily    ibuprofen (MOTRIN) tablet 600 mg  600 mg Oral Q6H PRN    lidocaine (LIDODERM) 5 % patch 1 patch  1 patch Topical Daily    lurasidone (LATUDA) tablet 20 mg  20 mg Oral HS    nicotine (NICODERM CQ) 7 mg/24hr TD 24 hr patch 1 patch  1 patch Transdermal Daily    ondansetron (ZOFRAN) injection 4 mg  4 mg Intravenous Q6H PRN    sodium chloride 0 9 % infusion  100 mL/hr Intravenous Continuous    tamsulosin (FLOMAX) capsule 0 4 mg  0 4 mg Oral Daily With Dinner    traZODone (DESYREL) tablet 150 mg  150 mg Oral HS    and PTA meds:   Prior to Admission Medications   Prescriptions Last Dose Informant Patient Reported? Taking? DULoxetine (CYMBALTA) 60 mg delayed release capsule   Yes No   Sig: Take 120 mg by mouth daily   atorvastatin (LIPITOR) 20 mg tablet   Yes No   Sig: Take 20 mg by mouth daily   buprenorphine-naloxone (Suboxone) 8-2 mg   Yes No   Sig: Place 8 mg under the tongue 3 (three) times a day     diazepam (VALIUM) 5 mg tablet   Yes No   Sig: Take 10 mg by mouth every 8 (eight) hours as needed for anxiety     lurasidone (Latuda) 40 mg tablet   Yes No   Sig: Take 20 mg by mouth daily at bedtime   tamsulosin (Flomax) 0 4 mg   Yes No   Sig: Take 0 4 mg by mouth daily with dinner   traZODone (DESYREL) 150 mg tablet   Yes No   Sig: Take 150 mg by mouth daily at bedtime       Facility-Administered Medications: None       No Known Allergies    Objective   Vitals:Blood pressure 130/81, pulse 97, temperature (!) 97 3 °F (36 3 °C), temperature source Oral, resp  rate 20, height 6' (1 829 m), weight 87 kg (191 lb 12 8 oz), SpO2 98 %  ,Body mass index is 26 01 kg/m²  Intake/Output Summary (Last 24 hours) at 4/28/2022 1535  Last data filed at 4/28/2022 1501  Gross per 24 hour   Intake 1122 ml   Output 650 ml   Net 472 ml       Invasive Devices: Invasive Devices  Report    Peripheral Intravenous Line            Peripheral IV 04/26/22 Right Wrist 2 days    Peripheral IV 04/27/22 Right;Ventral (anterior) Forearm <1 day                Physical Exam  Neurologic Exam    Modified PE as this is a video consultation:  Gen:   NAD  Tangential  HEENT:  No Septal deviation EOMI NCAT    Resp:  Symmetric chest rise and patient in no obvious respiratory distress  MSK: ROM normal  Skin: No rash noted in visualized portion of this exam    Neuroexam: RN helped with testing  AO X person and place only does not answer time or recall questions, slower to process, mild word finding difficulty noted but no aphasia or dysarthria that is clear  Follows one and two step commands confused easily with three step commands  CN 2-12 grossly intact  Motor:  Intact antigravity bl UE  Bl LE with drift- pain limited, LUE fracture cannot test  Sensation: Intact to light touch, all ext but reports diminished above knee, intact below knee per him ( states chronic since remote L spine surgery)  Cerebellar: No dysmetria b/l with FNF testing  Cannot perform with left hand as fractured  Gait:  Deferred due to fall risk    Lab Results:   I have personally reviewed pertinent reports    , CBC:   Results from last 7 days   Lab Units 04/26/22  0430 04/25/22  1046   WBC Thousand/uL 6 22 9 02   RBC Million/uL 3 08* 3 24*   HEMOGLOBIN g/dL 10 7* 11 4*   HEMATOCRIT % 32 2* 33 6*   MCV fL 105* 104*   PLATELETS Thousands/uL 342 355   , BMP/CMP:   Results from last 7 days   Lab Units 04/26/22  0430 04/25/22  1047   SODIUM mmol/L 139 141   POTASSIUM mmol/L 3 5 4 2   CHLORIDE mmol/L 105 102   CO2 mmol/L 26 28   BUN mg/dL 11 15   CREATININE mg/dL 0 67 0 70   CALCIUM mg/dL 8 1* 8 5   AST U/L 17  --    ALT U/L 21  --    ALK PHOS U/L 96  --    EGFR ml/min/1 73sq m 104 102   , Vitamin B12:   Results from last 7 days   Lab Units 04/25/22  1047   VITAMIN B 12 pg/mL 319   , HgBA1C:   Results from last 7 days   Lab Units 04/26/22  0430   HEMOGLOBIN A1C % 5 0   , TSH:   Results from last 7 days   Lab Units 04/26/22  0430   TSH 3RD GENERATON uIU/mL 1 234   , Coagulation:   Results from last 7 days   Lab Units 04/25/22  1047   INR  0 98   , Lipid Profile:   Results from last 7 days   Lab Units 04/26/22  0430   HDL mg/dL 43   LDL CALC mg/dL 105*   TRIGLYCERIDES mg/dL 162*   , Ammonia:   Results from last 7 days   Lab Units 04/26/22  0430   AMMONIA umol/L 22   , Urinalysis:   Results from last 7 days   Lab Units 04/25/22  1245   COLOR UA  Dk Yellow   CLARITY UA  Clear   SPEC GRAV UA >=1 030   PH UA  6 0   LEUKOCYTES UA  Negative   NITRITE UA  Negative   GLUCOSE UA mg/dl Negative   KETONES UA mg/dl Negative   BILIRUBIN UA  Negative   BLOOD UA  Trace-Intact*   , Drug Screen:   Results from last 7 days   Lab Units 04/25/22  1245   BARBITURATE UR  Negative   BENZODIAZEPINE UR  Positive*   THC UR  Negative   COCAINE UR  Negative   METHADONE URINE  Negative   OPIATE UR  Negative   PCP UR  Negative     Imaging Studies: I have personally reviewed pertinent reports  and I have personally reviewed pertinent films in PACS [facility], CTA head/neck, MRI brain   EKG, Pathology, and Other Studies: I have personally reviewed pertinent reports  and I have personally reviewed pertinent films in PACS  VTE Prophylaxis: Sequential compression device (Venodyne)     Code Status: Level 1 - Full Code    Counseling / Coordination of Care  Total time spent today 60 minutes  Greater than 50% of total time was spent with the patient and / or family counseling and / or coordination of care  A description of the counseling / coordination of care: as detailed above in A/P on day of exam 4/29/22  This is a late note, I called son multiple times to obtain baseline however recieved voicemail  Will try again as patient's son- Vear Holiday did want to report recent memory decline and other details over the last few months

## 2022-04-28 NOTE — CONSULTS
Consultation - Neuropsychology/Psychology Department  Nigel aPtel 63 y o  male MRN: [id number]  Unit/Bed#: -01 Encounter: [id number]        Reason for Consultation:  Nigel Patel is a 63y o  year old male who was referred for a Neuropsychological Exam to assess cognitive functioning and comment on capacity to make informed medical decisions        History of Present Illness  Presented with confusion; acute metabolic encephalopathy    Physician Requesting Consult: Lars Eden MD    PROBLEM LIST:  Patient Active Problem List   Diagnosis    Class 1 obesity due to excess calories without serious comorbidity with body mass index (BMI) of 30 0 to 30 9 in adult    Bipolar affective disorder ([address] )    Other hyperlipidemia    Memory loss    Closed fracture of head of left humerus    Acute pain of left shoulder    Closed 2-part displaced fracture of surgical neck of left humerus    Back pain    Toxic encephalopathy    Closed fracture of multiple ribs of left side with routine healing    Left humeral fracture    Opioid dependence ([address] )    Ecchymosis    Sacral decubitus ulcer, stage III ([address] )         Historical Information   Past Medical History:   Diagnosis Date    N[address] [address]      Past Surgical History:   Procedure Laterality Date    JOINT REPLACEMENT Bilateral     SPINAL FUSION       Social History   Social History     Substance and Sexual Activity   Alcohol Use Never     Social History     Substance and Sexual Activity   Drug Use Never     Social History     Tobacco Use   Smoking Status Current Every Day Smoker    Packs/day: 0 25    Years: 20 00    Pack years: 5 00    Types: Cigarettes   Smokeless Tobacco Never Used     Family History:   Family History   Problem Relation Age of Onset    Arthritis Mother        Meds/Allergies   current meds:   Current Facility-Administered Medications   Medication Dose Route Frequency    acetaminophen (TYLENOL) tablet 650 mg  650 mg Oral Q6H PRN    aspirin chewable tablet 81 mg  81 mg Oral Daily    atorvastatin (LIPITOR) tablet 40 mg  40 mg Oral QPM    buprenorphine-naloxone (Suboxone) film 8 mg  8 mg Sublingual TID    calcium carbonate (TUMS) chewable tablet 1,000 mg  1,000 mg Oral Daily PRN    diazepam (VALIUM) tablet 5 mg  5 mg Oral Q8H PRN    DULoxetine (CYMBALTA) delayed release capsule 120 mg  120 mg Oral Daily    ibuprofen (MOTRIN) tablet 600 mg  600 mg Oral Q6H PRN    lidocaine (LIDODERM) 5 % patch 1 patch  1 patch Topical Daily    lurasidone (LATUDA) tablet 20 mg  20 mg Oral HS    nicotine (NICODERM CQ) 7 mg/24hr TD 24 hr patch 1 patch  1 patch Transdermal Daily    ondansetron (ZOFRAN) injection 4 mg  4 mg Intravenous Q6H PRN    sodium chloride 0 9 % infusion  100 mL/hr Intravenous Continuous    tamsulosin (FLOMAX) capsule 0 4 mg  0 4 mg Oral Daily With Dinner    traZODone (DESYREL) tablet 150 mg  150 mg Oral HS       No Known Allergies      Family and Social Support:   No data recorded    Behavioral Observations: Alert, UNABLE to accurately state the year, season, day/[facility] name of [facility], cooperative; patient denied depressed mood and anxiety; staff reports patient tearful and with mood fluctuations; patient appeared to have difficulty expressing thoughts and forgot questions quite easily; easily distracted  Cognitive Examination    General Cognitive Functioning MMSE = Deficits in orientation, working memory and recall ; Attention/Concentration Auditory Selective Attention = Average; Auditory Vigilance = Impaired; Information Processing Speed = Impaired    Frontal Systems/Executive Functioning Mental Flexibility/Cognitive Control = Impaired; Working Memory = Impaired Abstract Reasoning = Impaired; Generative Ability = Impaired,  Commonsense Reasoning and Judgement = Impaired    Language Functioning  Phonemic Fluency = Impaired; Semantic Retrieval = Impaired;  Comprehension of Complex Ideational Material = Impaired; Memory Functioning Narrative Recall - Short Delay = Impaired; Long Delay Narrative Recall = Impaired;     Visuo-Spatial Abilities Not Assessed    Functional Knowledge  Health & Safety Knowledge = Impaired;     Summary/Impression:  Results of Neuropsychological Exam revealed diffuse cognitive dysfunction and on a measure assessing awareness of personal health status and ability to evaluate health problems, handle medical emergencies and take safety precautions, patient performed in the IMPAIRED range of functioning  At this time, patient does not appear to have capacity to make fully informed medical decisions  Unspecified Neurocognitive Disorder

## 2022-04-28 NOTE — PLAN OF CARE
Problem: OCCUPATIONAL THERAPY ADULT  Goal: Performs self-care activities at highest level of function for planned discharge setting  See evaluation for individualized goals  Description: Treatment Interventions: ADL retraining,Functional transfer training,UE strengthening/ROM,Endurance training,Patient/family training,Neuromuscular reeducation,Energy conservation,Activityengagement          See flowsheet documentation for full assessment, interventions and recommendations     Note: Limitation: Decreased ADL status,Decreased UE ROM,Decreased UE strength,Decreased Safe judgement during ADL,Decreased cognition,Decreased endurance,Decreased self-care trans,Decreased high-level ADLs  Prognosis: [address]        OT Discharge Recommendation: Home with home health rehabilitation

## 2022-04-28 NOTE — PROGRESS NOTES
[address]  Progress Note - [facility] 1960, 63 y o  male MRN: [id number]  Unit/Bed#: MS [place] Encounter: [id number]  Primary Care Provider: Genesis Suarez MD   Date and time admitted to hospital: 4/25/2022 10:18 AM    * Toxic encephalopathy  Assessment & Plan  Most likely secondary to Suboxone and Valium  MRI of the brain also shows empty sella  Patient noted to have worsening confusion, yelling for help,  Found on the floor by neighbors  Found to be incontinent of stool  Currently in the ED he is having episodes of somnolence however is arousable for few minutes and then he drifts back to sleep  Urine drug screen is positive for benzos  Patient is chronically on Valium 10 mg 3 times daily and also on Suboxone 8/2 mg 1 sublingual film 3 times daily  Unclear whether he was abusing medications are withdrawing from medications  CT brain and CTa head and neck reviewed  No acute pathology noted  Continue neuro checks for now   Resume home medications  unless he becomes more awake later tonight and wants to take his Valium and Suboxone and that would be okay  Differential at this time includes drug withdrawal versus intoxication, seizure, less likely stroke  Pending Neurology consult      Sacral decubitus ulcer, stage III ([facility] [address] )  Assessment & Plan  Continue wound care  Follow wound culture  Will hold off antibiotics at this time    Ecchymosis  Assessment & Plan  Affecting left flank, left lateral chest, left upper thigh-hemoglobin is slowly trending down, will hold heparin at this time, and monitor    Opioid dependence [facility])  Assessment & Plan  chronic back pain, evidenced by daily use of Suboxone 8mg three times a day, requiring continued regime in hospital        Left humeral fracture  Assessment & Plan  Patient had left humeral fracture diagnosed on 04/04/2022 on an ED visit  Still present on imaging      · Orthopedic recommendation appreciated:Patient may not lift more than 1 lb with the left upper extremity  · PT/OT pendulum exercises, active assisted range of motion, below shoulder height left upper extremity      Closed fracture of multiple ribs of left side with routine healing  Assessment & Plan  CT chest and pelvis shows There are fractures of the left 6, 7th, 8th, and 9th ribs with reactive bone formation present suggesting that these fractures are subacute or less likely chronic  Continue pain control  These do not appear to be acute fractures and hence continue supportive care    Back pain  Assessment & Plan  Patient is complaining of chronic back pain  Continue Suboxone as per home regimen starting tomorrow  [facility]  Note reviewed  He is supposed to be on  mg 1 film sublingual t i d     Other hyperlipidemia  Assessment & Plan  Continue statin therapy    Bipolar affective disorder [facility])  Assessment & Plan  Continue home regimen of Valium, Cymbalta and [address]  Psychiatry consult appreciated          VTE Pharmacologic Prophylaxis: VTE Score: 2 Anticoagulation remain on hold due to ecchymosis    Patient Centered Rounds: I performed bedside rounds with nursing staff today  Discussions with Specialists or Other Care Team Provider:  Neurology    Education and Discussions with Family / Patient: with patient  Time Spent for Care: 15 minutes  More than 50% of total time spent on counseling and coordination of care as described above  Current Length of Stay: 3 day(s)  Current Patient Status: Inpatient   Certification Statement: The patient will continue to require additional inpatient hospital stay due to To monitor above condition  Discharge Plan: To be determined    Code Status: Level 1 - Full Code    Subjective:   Seen and evaluated during the round  Patient is still complaining of pain  Still emotionally upset  Denies any suicidal homicidal ideation      Objective:     Vitals:   Temp (24hrs), Av 9 °F (36 6 °C), Min:97 3 °F (36 3 °C), Max:98 1 °F (36 7 °C)    Temp:  [97 3 °F (36 3 °C)-98 1 °F (36 7 °C)] 97 3 °F (36 3 °C)  HR:  [76-98] 97  Resp:  [18-20] 20  BP: (119-131)/(69-81) 130/81  SpO2:  [94 %-98 %] 98 %  Body mass index is 26 01 kg/m²  Input and Output Summary (last 24 hours): Intake/Output Summary (Last 24 hours) at 4/28/2022 1507  Last data filed at 4/28/2022 1501  Gross per 24 hour   Intake 1122 ml   Output 650 ml   Net 472 ml       Physical Exam:   Physical Exam  Vitals and nursing note reviewed  Constitutional:       Appearance: He is not ill-appearing, toxic-appearing or diaphoretic  HENT:      Nose: Nose normal  No congestion  Mouth/Throat:      Mouth: Mucous membranes are moist       Pharynx: Oropharynx is clear  No oropharyngeal exudate  Eyes:      General: No scleral icterus  Conjunctiva/sclera: Conjunctivae normal       Pupils: Pupils are equal, round, and reactive to light  Cardiovascular:      Rate and Rhythm: Normal rate  Heart sounds: No murmur heard  No friction rub  No gallop  Pulmonary:      Effort: Pulmonary effort is normal  No respiratory distress  Breath sounds: No stridor  No wheezing or rhonchi  Abdominal:      General: Abdomen is flat  Bowel sounds are normal  There is no distension  Palpations: There is no mass  Tenderness: There is no abdominal tenderness  Hernia: No hernia is present  Musculoskeletal:         General: Tenderness (Multiple sites of the body) present  No swelling  Cervical back: Normal range of motion  Right lower leg: No edema  Left lower leg: No edema  Lymphadenopathy:      Cervical: No cervical adenopathy  Neurological:      Mental Status: He is alert and oriented to person, place, and time  Cranial Nerves: No cranial nerve deficit  Sensory: No sensory deficit  Motor: No weakness        Coordination: Coordination normal       Comments: Patient does response slowly with few specific questions         Additional Data:     Labs:  Results from last 7 days   Lab Units 04/26/22  0430   WBC Thousand/uL 6 22   HEMOGLOBIN g/dL 10 7*   HEMATOCRIT % 32 2*   PLATELETS Thousands/uL 342     Results from last 7 days   Lab Units 04/26/22  0430   SODIUM mmol/L 139   POTASSIUM mmol/L 3 5   CHLORIDE mmol/L 105   CO2 mmol/L 26   BUN mg/dL 11   CREATININE mg/dL 0 67   ANION GAP mmol/L 8   CALCIUM mg/dL 8 1*   ALBUMIN g/dL 2 9*   TOTAL BILIRUBIN mg/dL 0 48   ALK PHOS U/L 96   ALT U/L 21   AST U/L 17   GLUCOSE RANDOM mg/dL 100     Results from last 7 days   Lab Units 04/25/22  1047   INR  0 98     Results from last 7 days   Lab Units 04/25/22  1044   POC GLUCOSE mg/dl 129     Results from last 7 days   Lab Units 04/26/22  0430   HEMOGLOBIN A1C % 5 0     Results from last 7 days   Lab Units 04/25/22  1046   LACTIC ACID mmol/L 0 7       Lines/Drains:  Invasive Devices  Report    Peripheral Intravenous Line            Peripheral IV 04/26/22 Right Wrist 2 days    Peripheral IV 04/27/22 Right;Ventral (anterior) Forearm <1 day                      Imaging: Reviewed radiology reports from this admission including: MRI brain    Recent Cultures (last 7 days):   Results from last 7 days   Lab Units 04/27/22  1212 04/25/22  1121 04/25/22  1053   BLOOD CULTURE   --  No Growth at 48 hrs  No Growth at 48 hrs     GRAM STAIN RESULT  No polys seen*  Rare Gram positive cocci in pairs*  Rare Gram positive rods*  --   --        Last 24 Hours Medication List:   Current Facility-Administered Medications   Medication Dose Route Frequency Provider Last Rate    acetaminophen  650 mg Oral Q6H PRN Kanika Vaughan MD      aspirin  81 mg Oral Daily Kanika Vaughan MD      atorvastatin  40 mg Oral QPM Kanika Vaughan MD      buprenorphine-naloxone  8 mg Sublingual TID Kanika Vaughan MD      calcium carbonate  1,000 mg Oral Daily PRN Kanika Vaughan MD      diazepam  5 mg Oral Q8H PRN Kanika Vaughan MD      DULoxetine  120 mg Oral Daily Kanika Vaughan MD  ibuprofen  600 mg Oral Q6H PRN Damaso Pan MD      lidocaine  1 patch Topical Daily Damaso Pan MD      lurasidone  20 mg Oral HS Nathan King MD      nicotine  1 patch Transdermal Daily Nathan King MD      ondansetron  4 mg Intravenous Q6H PRN Nathan King MD      sodium chloride  100 mL/hr Intravenous Continuous Nathan King  mL/hr (04/28/22 0153)    tamsulosin  0 4 mg Oral Daily With Meg Singleton MD      traZODone  150 mg Oral HS Nathan King MD          Today, Patient Was Seen By: Damaso Pan MD    **Please Note: This note may have been constructed using a voice recognition system  **

## 2022-04-28 NOTE — PLAN OF CARE
Problem: PHYSICAL THERAPY ADULT  Goal: Performs mobility at highest level of function for planned discharge setting  See evaluation for individualized goals  Description: Treatment/Interventions: ADL retraining,Functional transfer training,LE strengthening/ROM,Elevations,Therapeutic exercise,Cognitive reorientation,Endurance training,Patient/family training,Equipment eval/education,Bed mobility,Gait training,Compensatory technique education,Spoke to nursing  Equipment Recommended:  (pt has cane)       See flowsheet documentation for full assessment, interventions and recommendations  Outcome: Progressing  Note: Prognosis: Good  Problem List: Decreased strength,Decreased endurance,Impaired balance,Decreased mobility,Impaired judgement,Decreased safety awareness,Decreased skin integrity  Assessment: Pt seen for PT treatment session this date with interventions consisting of transfer training, toilet transfer, gait training, seated  TE, and education provided as needed for safety and direction to improve functional mobility and activity tolerance   Pt agreeable to PT treatment session upon arrival, pt found sitting OOB in recliner  At end of session, pt left sitting OOB in recliner with chair alarm activated, SCD's applied, and all needs in reach  In comparison to previous session, pt with improvements in ambulation distance  Continue to recommend Home PT at time of d/c in order to maximize pt's functional independence and safety w/ mobility  Pt continues to be functioning below baseline level  PT will continue to see pt while here in order to address the deficits listed above and provide interventions consistent w/ POC in effort to achieve STGs  Barriers to Discharge: Decreased caregiver support  Barriers to Discharge Comments: lives alone   would beenfit from increased support of family, community     PT Discharge Recommendation: Home with home health rehabilitation          See flowsheet documentation for full assessment

## 2022-04-28 NOTE — OCCUPATIONAL THERAPY NOTE
Occupational Therapy Evaluation     Patient Name: Ginny Cavazos  Today's Date: 4/28/2022  Problem List  Principal Problem:    Toxic encephalopathy  Active Problems:    Bipolar affective disorder ([facility] [address] )    Other hyperlipidemia    Back pain    Closed fracture of multiple ribs of left side with routine healing    Left humeral fracture    Opioid dependence ([facility] [address] )    Ecchymosis    Sac[facility] decub[facility] )    Past Medical History  Past Medical History:   Diagnosis Date    Narcotic dependence [facility])      Past Surgical History  Past Surgical History:   Procedure Laterality Date    JOINT REPLACEMENT Bilateral     SPINAL FUSION           04/28/22 0744   Note Type   Note type Evaluation   Restrictions/Precautions   Weight Bearing Precautions Per Order Yes   LUE Weight Bearing Per Order NWB   Braces or Orthoses Sling  (prn for comfort)   Other Precautions Chair Alarm; Bed Alarm;Cognitive;Multiple lines; Fall Risk;Pain   Pain Assessment   Pain Assessment Tool 0-10   Pain Score 2   Pain Location/Orientation Orientation: Left; Location: Arm   Home Living   Type of [address]  (Pt reporting having cane but not using it all the time)   Additional Comments Pt poor historian at [facility] time  Pt reproting living in apartment but could not recall setup  Prior Function   Level of [place] Independent with ADLs and functional mobility   Lives With Alone   ADL Assistance Independent   IADLs Independent  (- driving due to back surgeries)   Falls in the last 6 months 1 to 4   Comments Pt reporting being IND with ADLs and IADLs prior to hospitalization  Psychosocial   Psychosocial (WDL) X   Patient Behaviors/Mood Anxious; Cooperative; Tearful   ADL   Toileting Assistance  4  Minimal Assistance  (Steadying)   Toileting Deficit Setup;Steadying;Verbal cueing;Supervison/safety; Increased time to complete;Grab bar use;Clothing management up   Additional Comments Pt completed toilet hygiene while sitting on toilet with SBA  Pt requiring Melisa for CM due to inability to utilize LUE in sling  Pt refusal to participate in other LB dressing but based on pt's performance during toileting tasks, pt would require assistance with LB and UB dressing secondary to decreased use of LUE  Pt requiring max cuing for initiation of participation in tasks  Pt perseverating on calling son and brother  Pt anxious and tearful throughout session  Bed Mobility   Additional Comments Pt OOB prior to session  Bed mobility not assessed  Transfers   Sit to Stand   (SBA)   Additional items Verbal cues; Impulsive   Stand to Sit   (SBA)   Additional items Verbal cues; Impulsive   Stand pivot   (Steadying)   Additional items Verbal cues; Impulsive; Increased time required   Toilet transfer   (Steadying)   Additional items Impulsive;Verbal cues;Standard toilet   Additional Comments Pt completing transfers without AD  Pt completing STS transfers with SBA and SPT and toilet tranfer with steadying assist for safety due to impulsivity and confusion  Pt requiring max cuing throughout session for initiaion and sequencing due to decreasd level of cognition  Balance   Static Sitting Good   Dynamic Sitting Fair +   Static Standing Fair   Dynamic Standing Fair -   Ambulatory Fair -   Activity Tolerance   Activity Tolerance Patient limited by fatigue   Medical Staff Made Aware RN   Nurse Made Aware Yes   RUE Assessment   RUE Assessment WFL   LUE Assessment   LUE Assessment   (Pt in LUE sling)   Edema   LUE Edema Non-pitting   Hand Function   Gross Motor Coordination Impaired   Fine Motor Coordination Functional   Sensation   Light Touch No apparent deficits   Cognition   Overall Cognitive Status Impaired   Arousal/Participation Responsive; Alert   Attention Difficulty attending to directions   Orientation Level Oriented to person;Oriented to place; Disoriented to time;Disoriented to situation   Memory Decreased recall of biographical information;Decreased recall of recent events   Following Commands Follows one step commands inconsistently   Comments Pt presenting with perseveration on calling son and brother during session  Pt requiring max cuing to redirect  Pt confused with decreased ability to following one step directions  Pt tearful and anxious throughout  Emotional support provided  Assessment   Limitation Decreased ADL status; Decreased UE ROM; Decreased UE strength;Decreased Safe judgement during ADL;Decreased cognition;Decreased endurance;Decreased self-care trans;Decreased high-level ADLs   Prognosis Fair   Assessment Pt is a 63 y o  male, admitted to [address] [place] [address] 4/25/2022 d/t experiencing confusion, fall and incontinence  Dx: Toxic encephalopathy  Pt with L humeral fx as well  NWB LUE with sling PRN for comfort  Pt with PMHx impacting their performance during ADL tasks, including: narcotic dependence  Prior to admission to the hospital Pt was performing ADLs without physical assistance  IADLs without physical assistance  Functional transfers/ambulation without physical assistance  Cognitive status was PTA was WNL  OT order placed to assess Pt's ADLs, cognitive status, and performance during functional tasks in order to maximize safety and independence while making most appropriate d/c recommendations   Pt's clinical presentation is currently unstable/unpredictable given new onset deficits that effect Pt's occupational performance and ability to safely return to [facility] including decrease activity tolerance, decrease standing balance, decrease performance during ADL tasks, decrease cognition, decrease safety awareness , increase impulsiveness, decrease BUE ROM, decrease UB MS, increased pain, decrease generalized strength, decrease activity engagement, decrease performance during functional transfers, limited family support, high fall risk and limited insight to deficits combined with medical complications of change in mental status, abnormal H&H and incontinence  Personal factors affecting Pt at time of initial evaluation include: anxiety, impulsivity, limited home support, inability to perform IADLs, inability to perform ADLs, inability to navigate community distances, limited insight into impairments and recent fall(s)/fall history  Pt will benefit from continued skilled OT services to address deficits as defined above and to maximize level independence/participation during ADLs and functional tasks to facilitate return toward PLOF and improved quality of life  From an occupational therapy standpoint, recommendation at time of d/c would be home with [address] pending increased cognition and increased family support  Plan   Treatment Interventions ADL retraining;Functional transfer training;UE strengthening/ROM; Endurance training;Patient/family training;Neuromuscular reeducation; Energy conservation; Activityengagement   Goal Expiration Date 05/12/22   OT Frequency 3-5x/wk   Recommendation   OT Discharge Recommendation Home with home health rehabilitation   Additional Comments  Pt able to return home with [address] pending improvement in cognition and increased support from family  [facility] Daily Activity Inpatient   Lower Body Dressing 3   Bathing 3   Toileting 3   Upper Body Dressing 3   Grooming 3   Eating 4   Daily Activity Raw Score 19   Daily Activity Standardized Score (Calc for Raw Score >=11) 40 22   [facility] Applied Cognition Inpatient   Following a Speech/Presentation 3   Understanding Ordinary Conversation 3   Taking Medications 2   Remembering Where Things Are Placed or Put Away 2   Remembering List of 4-5 Errands 2   Taking Care of Complicated Tasks 2   Applied Cognition Raw Score 14   Applied Cognition Standardized Score 32 02     The patient's raw score on the AM-[facility] Daily Activity inpatient short form is 19, standardized score is 40 22, greater than 39 4  Patients at this level are likely to benefit from DC to home with [address] [place]   Pt requiring increased family support upon return home  Please refer to the recommendation of the Occupational Therapist for safe DC planning  Pt goals to be met by 5/12/2022  1  Pt will demonstrate ability to complete grooming/hygiene tasks @ Mary after set-up  2  Pt will demonstrate ability to complete supine<>sit @ Mary in order to increase safety and independence during ADL tasks  3  Pt will demonstrate ability to complete UB ADLs including washing/dressing @ Mary in order to increase performance and participation during meaningful tasks  4  Pt will demonstrate ability to complete LB dressing @ Mary in order to increase safety and independence during meaningful tasks  5  Pt will demonstrate ability to declan/doff socks/shoes while sitting EOB @ Mary in order to increase safety and independence during meaningful tasks  6  Pt will demonstrate ability to complete toileting tasks including CM and pericare @ Mary in order to increase safety and independence during meaningful tasks  7  Pt will demonstrate ability to complete EOB, chair, toilet/commode transfers @ Mary in order to increase performance and participation during functional tasks  8  Pt will demonstrate ability to stand for 6 minutes while maintaining F+ balance with use of cane for UB support PRN  9  Pt will demonstrate ability to tolerate 30-35 minute OT session with no vc'ing for deep breathing or use of energy conservation techniques in order to increase activity tolerance during functional tasks  10  Pt will demonstrate Good carryover of use of energy conservation/compensatory strategies during ADLs and functional tasks in order to increase safety and reduce risk for falls  11  Pt will demonstrate Good attention and participation in continued evaluation of functional ambulation house hold distances in order to assist with safe d/c planning    12  Pt will attend to continued cognitive assessments 100% of the time in order to provide most appropriate d/c recommendations  13  Pt will follow 100% simple 2-step commands and be A&O x4 consistently with environmental cues to increase participation in functional activities  14  Pt will identify 3 areas of interest/hobbies and 1 intervention on how to incorporate into daily life in order to increase interaction with environment and peers as well as increase participation in meaningful tasks  15  Pt will demonstrate 100% carryover of BUE HEP in order to increase BUE MS and increase performance during functional tasks upon d/c home      Antonietta Salgado OTR/L

## 2022-04-28 NOTE — ASSESSMENT & PLAN NOTE
63 y o with HLD, bipolar, chronic back pain currently on valium and narcotic dependence currently on Suboxone who presents after being found by neighbors confused and on the ground at home  He was incontinent of stool and yelling out  Upon arrival to ED, BP normotensive  CTH/CTA head/neck negative for acute intracranial abnormality, significant stenosis of LVO  NIHSS 0  He was deemed not a tPA candidate due to being out of the window and non focal exam  Trauma work up revealed multiple subacute/chronic rib fractures  UDS was positive for benzo's  Patient was admitted on the stroke pathway  MRI brain was unremarkable for acute infarct, hemorrhage or mass  Etiology of symptoms suspicious for acute toxic metabolic encephalopathy secondary to Suboxone and Valium use, less likely stroke given negatiev work up thus far  Plan:  - Continue Aspirin 81 mg and Lipitor 40 mg   - , Cholesterol 180  - Hemoglobin A1c 5 0  - TSH normal   - Maintain normotensive, euglycemic, normothermic goal  - Continue telemetry  - PT/OT/ST  - Secondary risk factor modification   - Stroke education  - Frequent neuro checks  Continue to monitor and notify neurology with any changes   - STAT CT head for any acute change in neuro exam  - Medical management and supportive care per primary team  Correction of any metabolic or infectious disturbances

## 2022-04-28 NOTE — ASSESSMENT & PLAN NOTE
Most likely secondary to Suboxone and Valium  MRI of the brain also shows empty sella  Patient noted to have worsening confusion, yelling for help,  Found on the floor by neighbors  Found to be incontinent of stool  Currently in the ED he is having episodes of somnolence however is arousable for few minutes and then he drifts back to sleep  Urine drug screen is positive for benzos  Patient is chronically on Valium 10 mg 3 times daily and also on Suboxone 8/2 mg 1 sublingual film 3 times daily  Unclear whether he was abusing medications are withdrawing from medications  CT brain and CTa head and neck reviewed  No acute pathology noted  Continue neuro checks for now   Resume home medications  unless he becomes more awake later tonight and wants to take his Valium and Suboxone and that would be okay    Differential at this time includes drug withdrawal versus intoxication, seizure, less likely stroke  Pending Neurology consult

## 2022-04-28 NOTE — CASE MANAGEMENT
Case Management Progress Note    Patient name Lauren Flores  Location [address] [facility] [address] [id number] MRN [id number]  : 1960 Date 2022       LOS (days): 3  Geometric Mean LOS (GMLOS) (days): 4 20  Days to GMLOS:1 4        OBJECTIVE:        Current admission status: Inpatient  Preferred Pharmacy:   [facility] [id number] [address]  Phone: [phone] Fax: [phone]    Primary Care Provider: Dee Velasquez MD    Primary Insurance: MEDICARE  Secondary Insurance: [id number]    PROGRESS NOTE:    [facility] will accept pt at time of discharge

## 2022-04-28 NOTE — PLAN OF CARE
Problem: MOBILITY - ADULT  Goal: Maintain or return to baseline ADL function  Description: INTERVENTIONS:  -  Assess patient's ability to carry out ADLs; assess patient's baseline for ADL function and identify physical deficits which impact ability to perform ADLs (bathing, care of mouth/teeth, toileting, grooming, dressing, etc )  - Assess/evaluate cause of self-care deficits   - Assess range of motion  - Assess patient's mobility; develop plan if impaired  - Assess patient's need for assistive devices and provide as appropriate  - Encourage maximum independence but intervene and supervise when necessary  - Involve family in performance of ADLs  - Assess for home care needs following discharge   - Consider OT consult to assist with ADL evaluation and planning for discharge  - Provide patient education as appropriate  Outcome: Progressing  Goal: Maintains/Returns to pre admission functional level  Description: INTERVENTIONS:  - Perform BMAT or MOVE assessment daily    - Set and communicate daily mobility goal to care team and patient/family/caregiver  - Collaborate with rehabilitation services on mobility goals if consulted  - Perform Range of Motion - times a day  - Reposition patient every - hours    - Dangle patient - times a day  - Stand patient - times a day  - Ambulate patient - times a day  - Out of bed to chair - times a day   - Out of bed for meals - times a day  - Out of bed for toileting  - Record patient progress and toleration of activity level   Outcome: Progressing     Problem: PAIN - ADULT  Goal: Verbalizes/displays adequate comfort level or baseline comfort level  Description: Interventions:  - Encourage patient to monitor pain and request assistance  - Assess pain using appropriate pain scale  - Administer analgesics based on type and severity of pain and evaluate response  - Implement non-pharmacological measures as appropriate and evaluate response  - Consider cultural and social influences on pain and pain management  - Notify physician/advanced practitioner if interventions unsuccessful or patient reports new pain  Outcome: Progressing     Problem: INFECTION - ADULT  Goal: Absence or prevention of progression during hospitalization  Description: INTERVENTIONS:  - Assess and monitor for signs and symptoms of infection  - Monitor lab/diagnostic results  - Monitor all insertion sites, i e  indwelling lines, tubes, and drains  - Monitor endotracheal if appropriate and nasal secretions for changes in amount and color  - Renick appropriate cooling/warming therapies per order  - Administer medications as ordered  - Instruct and encourage patient and family to use good hand hygiene technique  - Identify and instruct in appropriate isolation precautions for identified infection/condition  Outcome: Progressing  Goal: Absence of fever/infection during neutropenic period  Description: INTERVENTIONS:  - Monitor WBC    Outcome: Progressing     Problem: DISCHARGE PLANNING  Goal: Discharge to home or other facility with appropriate resources  Description: INTERVENTIONS:  - Identify barriers to discharge w/patient and caregiver  - Arrange for needed discharge resources and transportation as appropriate  - Identify discharge learning needs (meds, wound care, etc )  - Arrange for interpretive services to assist at discharge as needed  - Refer to Case Management Department for coordinating discharge planning if the patient needs post-hospital services based on physician/advanced practitioner order or complex needs related to functional status, cognitive ability, or social support system  Outcome: Progressing     Problem: Knowledge Deficit  Goal: Patient/family/caregiver demonstrates understanding of disease process, treatment plan, medications, and discharge instructions  Description: Complete learning assessment and assess knowledge base    Interventions:  - Provide teaching at level of understanding  - Provide teaching via preferred learning methods  Outcome: Progressing     Problem: NEUROSENSORY - ADULT  Goal: Achieves stable or improved neurological status  Description: INTERVENTIONS  - Monitor and report changes in neurological status  - Monitor vital signs such as temperature, blood pressure, glucose, and any other labs ordered   - Initiate measures to prevent increased intracranial pressure  - Monitor for seizure activity and implement precautions if appropriate      Outcome: Progressing  Goal: Remains free of injury related to seizures activity  Description: INTERVENTIONS  - Maintain airway, patient safety  and administer oxygen as ordered  - Monitor patient for seizure activity, document and report duration and description of seizure to physician/advanced practitioner  - If seizure occurs,  ensure patient safety during seizure  - Reorient patient post seizure  - Seizure pads on all 4 side rails  - Instruct patient/family to notify RN of any seizure activity including if an aura is experienced  - Instruct patient/family to call for assistance with activity based on nursing assessment  - Administer anti-seizure medications if ordered    Outcome: Progressing  Goal: Achieves maximal functionality and self care  Description: INTERVENTIONS  - Monitor swallowing and airway patency with patient fatigue and changes in neurological status  - Encourage and assist patient to increase activity and self care     - Encourage visually impaired, hearing impaired and aphasic patients to use assistive/communication devices  Outcome: Progressing     Problem: MUSCULOSKELETAL - ADULT  Goal: Maintain or return mobility to safest level of function  Description: INTERVENTIONS:  - Assess patient's ability to carry out ADLs; assess patient's baseline for ADL function and identify physical deficits which impact ability to perform ADLs (bathing, care of mouth/teeth, toileting, grooming, dressing, etc )  - Assess/evaluate cause of self-care deficits   - Assess range of motion  - Assess patient's mobility  - Assess patient's need for assistive devices and provide as appropriate  - Encourage maximum independence but intervene and supervise when necessary  - Involve family in performance of ADLs  - Assess for home care needs following discharge   - Consider OT consult to assist with ADL evaluation and planning for discharge  - Provide patient education as appropriate  Outcome: Progressing  Goal: Maintain proper alignment of affected body part  Description: INTERVENTIONS:  - Support, maintain and protect limb and body alignment  - Provide patient/ family with appropriate education  Outcome: Progressing     Problem: Neurological Deficit  Goal: Neurological status is stable or improving  Description: Interventions:  - Monitor and assess patient's level of consciousness, motor function, sensory function, and level of assistance needed for ADLs  - Monitor and report changes from baseline  Collaborate with interdisciplinary team to initiate plan and implement interventions as ordered  - Provide and maintain a safe environment  - Consider seizure precautions  - Consider fall precautions  - Consider aspiration precautions  - Consider bleeding precautions  Outcome: Progressing     Problem: Activity Intolerance/Impaired Mobility  Goal: Mobility/activity is maintained at optimum level for patient  Description: Interventions:  - Assess and monitor patient  barriers to mobility and need for assistive/adaptive devices  - Assess patient's emotional response to limitations  - Collaborate with interdisciplinary team and initiate plans and interventions as ordered  - Encourage independent activity per ability   - Maintain proper body alignment  - Perform active/passive rom as tolerated/ordered    - Plan activities to conserve energy   - Turn patient as appropriate  Outcome: Progressing     Problem: Communication Impairment  Goal: Ability to express needs and understand communication  Description: Assess patient's communication skills and ability to understand information  Patient will demonstrate use of effective communication techniques, alternative methods of communication and understanding even if not able to speak  - Encourage communication and provide alternate methods of communication as needed  - Collaborate with case management/ for discharge needs  - Include patient/family/caregiver in decisions related to communication  Outcome: Progressing     Problem: Potential for Aspiration  Goal: Non-ventilated patient's risk of aspiration is minimized  Description: Assess and monitor vital signs, respiratory status, and labs (WBC)  Monitor for signs of aspiration (tachypnea, cough, rales, wheezing, cyanosis, fever)  - Assess and monitor patient's ability to swallow  - Place patient up in chair to eat if possible  - HOB up at 90 degrees to eat if unable to get patient up into chair   - Supervise patient during oral intake  - Instruct patient/ family to take small bites  - Instruct patient/ family to take small single sips when taking liquids  - Follow patient-specific strategies generated by speech pathologist   Outcome: Progressing  Goal: Ventilated patient's risk of aspiration is minimized  Description: Assess and monitor vital signs, respiratory status, airway cuff pressure, and labs (WBC)  Monitor for signs of aspiration (tachypnea, cough, rales, wheezing, cyanosis, fever)  - Elevate head of bed 30 degrees if patient has tube feeding   - Monitor tube feeding  Outcome: Progressing     Problem: Nutrition  Goal: Nutrition/Hydration status is improving  Description: Monitor and assess patient's nutrition/hydration status for malnutrition (ex- brittle hair, bruises, dry skin, pale skin and conjunctiva, muscle wasting, smooth red tongue, and disorientation)   Collaborate with interdisciplinary team and initiate plan and interventions as ordered  Monitor patient's weight and dietary intake as ordered or per policy  Utilize nutrition screening tool and intervene per policy  Determine patient's food preferences and provide high-protein, high-caloric foods as appropriate  - Assist patient with eating   - Allow adequate time for meals   - Encourage patient to take dietary supplement as ordered  - Collaborate with clinical nutritionist   - Include patient/family/caregiver in decisions related to nutrition    Outcome: Progressing     Problem: Prexisting or High Potential for Compromised Skin Integrity  Goal: Skin integrity is maintained or improved  Description: INTERVENTIONS:  - Identify patients at risk for skin breakdown  - Assess and monitor skin integrity  - Assess and monitor nutrition and hydration status  - Monitor labs   - Assess for incontinence   - Turn and reposition patient  - Assist with mobility/ambulation  - Relieve pressure over bony prominences  - Avoid friction and shearing  - Provide appropriate hygiene as needed including keeping skin clean and dry  - Evaluate need for skin moisturizer/barrier cream  - Collaborate with interdisciplinary team   - Patient/family teaching  - Consider wound care consult   Outcome: Progressing     Problem: Potential for Falls  Goal: Patient will remain free of falls  Description: INTERVENTIONS:  - Educate patient/family on patient safety including physical limitations  - Instruct patient to call for assistance with activity   - Consult OT/PT to assist with strengthening/mobility   - Keep Call bell within reach  - Keep bed low and locked with side rails adjusted as appropriate  - Keep care items and personal belongings within reach  - Initiate and maintain comfort rounds  - Make Fall Risk Sign visible to staff  - Offer Toileting every 2 Hours, in advance of need  - Initiate/Maintain alarm  - Obtain necessary fall risk management equipment  - Apply yellow socks and bracelet for high fall risk patients  - Consider moving patient to room near nurses station  Outcome: Progressing     Problem: Nutrition/Hydration-ADULT  Goal: Nutrient/Hydration intake appropriate for improving, restoring or maintaining nutritional needs  Description: Monitor and assess patient's nutrition/hydration status for malnutrition  Collaborate with interdisciplinary team and initiate plan and interventions as ordered  Monitor patient's weight and dietary intake as ordered or per policy  Utilize nutrition screening tool and intervene as necessary  Determine patient's food preferences and provide high-protein, high-caloric foods as appropriate       INTERVENTIONS:  - Monitor oral intake, urinary output, labs, and treatment plans  - Assess nutrition and hydration status and recommend course of action  - Evaluate amount of meals eaten  - Assist patient with eating if necessary   - Allow adequate time for meals  - Recommend/ encourage appropriate diets, oral nutritional supplements, and vitamin/mineral supplements  - Order, calculate, and assess calorie counts as needed  - Recommend, monitor, and adjust tube feedings and TPN/PPN based on assessed needs  - Assess need for intravenous fluids  - Provide specific nutrition/hydration education as appropriate  - Include patient/family/caregiver in decisions related to nutrition  Outcome: Progressing

## 2022-04-28 NOTE — PLAN OF CARE
Problem: MOBILITY - ADULT  Goal: Maintain or return to baseline ADL function  Description: INTERVENTIONS:  -  Assess patient's ability to carry out ADLs; assess patient's baseline for ADL function and identify physical deficits which impact ability to perform ADLs (bathing, care of mouth/teeth, toileting, grooming, dressing, etc )  - Assess/evaluate cause of self-care deficits   - Assess range of motion  - Assess patient's mobility; develop plan if impaired  - Assess patient's need for assistive devices and provide as appropriate  - Encourage maximum independence but intervene and supervise when necessary  - Involve family in performance of ADLs  - Assess for home care needs following discharge   - Consider OT consult to assist with ADL evaluation and planning for discharge  - Provide patient education as appropriate  Outcome: Progressing  Goal: Maintains/Returns to pre admission functional level  Description: INTERVENTIONS:  - Perform BMAT or MOVE assessment daily    - Set and communicate daily mobility goal to care team and patient/family/caregiver  - Collaborate with rehabilitation services on mobility goals if consulted  - Perform Range of Motion - times a day  - Reposition patient every - hours    - Dangle patient - times a day  - Stand patient - times a day  - Ambulate patient - times a day  - Out of bed to chair - times a day   - Out of bed for meals - times a day  - Out of bed for toileting  - Record patient progress and toleration of activity level   Outcome: Progressing     Problem: PAIN - ADULT  Goal: Verbalizes/displays adequate comfort level or baseline comfort level  Description: Interventions:  - Encourage patient to monitor pain and request assistance  - Assess pain using appropriate pain scale  - Administer analgesics based on type and severity of pain and evaluate response  - Implement non-pharmacological measures as appropriate and evaluate response  - Consider cultural and social influences on pain and pain management  - Notify physician/advanced practitioner if interventions unsuccessful or patient reports new pain  Outcome: Progressing     Problem: INFECTION - ADULT  Goal: Absence or prevention of progression during hospitalization  Description: INTERVENTIONS:  - Assess and monitor for signs and symptoms of infection  - Monitor lab/diagnostic results  - Monitor all insertion sites, i e  indwelling lines, tubes, and drains  - Monitor endotracheal if appropriate and nasal secretions for changes in amount and color  - [place] appropriate cooling/warming therapies per order  - Administer medications as ordered  - Instruct and encourage patient and family to use good hand hygiene technique  - Identify and instruct in appropriate isolation precautions for identified infection/condition  Outcome: Progressing     Problem: INFECTION - ADULT  Goal: Absence or prevention of progression during hospitalization  Description: INTERVENTIONS:  - Assess and monitor for signs and symptoms of infection  - Monitor lab/diagnostic results  - Monitor all insertion sites, i e  indwelling lines, tubes, and drains  - Monitor endotracheal if appropriate and nasal secretions for changes in amount and color  - [place] appropriate cooling/warming therapies per order  - Administer medications as ordered  - Instruct and encourage patient and family to use good hand hygiene technique  - Identify and instruct in appropriate isolation precautions for identified infection/condition  Outcome: Progressing  Goal: Absence of fever/infection during neutropenic period  Description: INTERVENTIONS:  - Monitor WBC    Outcome: Progressing     Problem: DISCHARGE PLANNING  Goal: Discharge to home or other facility with appropriate resources  Description: INTERVENTIONS:  - Identify barriers to discharge w/patient and caregiver  - Arrange for needed discharge resources and transportation as appropriate  - Identify discharge learning needs (meds, wound care, etc )  - Arrange for interpretive services to assist at discharge as needed  - Refer to Case Management Department for coordinating discharge planning if the patient needs post-hospital services based on physician/advanced practitioner order or complex needs related to functional status, cognitive ability, or social support system  Outcome: Progressing     Problem: Knowledge Deficit  Goal: Patient/family/caregiver demonstrates understanding of disease process, treatment plan, medications, and discharge instructions  Description: Complete learning assessment and assess knowledge base    Interventions:  - Provide teaching at level of understanding  - Provide teaching via preferred learning methods  Outcome: Progressing     Problem: NEUROSENSORY - ADULT  Goal: Achieves stable or improved neurological status  Description: INTERVENTIONS  - Monitor and report changes in neurological status  - Monitor vital signs such as temperature, blood pressure, glucose, and any other labs ordered   - Initiate measures to prevent increased intracranial pressure  - Monitor for seizure activity and implement precautions if appropriate      Outcome: Progressing  Goal: Remains free of injury related to seizures activity  Description: INTERVENTIONS  - Maintain airway, patient safety  and administer oxygen as ordered  - Monitor patient for seizure activity, document and report duration and description of seizure to physician/advanced practitioner  - If seizure occurs,  ensure patient safety during seizure  - Reorient patient post seizure  - Seizure pads on all 4 side rails  - Instruct patient/family to notify RN of any seizure activity including if an aura is experienced  - Instruct patient/family to call for assistance with activity based on nursing assessment  - Administer anti-seizure medications if ordered    Outcome: Progressing  Goal: Achieves maximal functionality and self care  Description: INTERVENTIONS  - Monitor swallowing and airway patency with patient fatigue and changes in neurological status  - Encourage and assist patient to increase activity and self care  - Encourage visually impaired, hearing impaired and aphasic patients to use assistive/communication devices  Outcome: Progressing     Problem: MUSCULOSKELETAL - ADULT  Goal: Maintain or return mobility to safest level of function  Description: INTERVENTIONS:  - Assess patient's ability to carry out ADLs; assess patient's baseline for ADL function and identify physical deficits which impact ability to perform ADLs (bathing, care of mouth/teeth, toileting, grooming, dressing, etc )  - Assess/evaluate cause of self-care deficits   - Assess range of motion  - Assess patient's mobility  - Assess patient's need for assistive devices and provide as appropriate  - Encourage maximum independence but intervene and supervise when necessary  - Involve family in performance of ADLs  - Assess for home care needs following discharge   - Consider OT consult to assist with ADL evaluation and planning for discharge  - Provide patient education as appropriate  Outcome: Progressing  Goal: Maintain proper alignment of affected body part  Description: INTERVENTIONS:  - Support, maintain and protect limb and body alignment  - Provide patient/ family with appropriate education  Outcome: Progressing     Problem: Neurological Deficit  Goal: Neurological status is stable or improving  Description: Interventions:  - Monitor and assess patient's level of consciousness, motor function, sensory function, and level of assistance needed for ADLs  - Monitor and report changes from baseline  Collaborate with interdisciplinary team to initiate plan and implement interventions as ordered  - Provide and maintain a safe environment  - Consider seizure precautions  - Consider fall precautions  - Consider aspiration precautions  - Consider bleeding precautions    Outcome: Progressing     Problem: Activity Intolerance/Impaired Mobility  Goal: Mobility/activity is maintained at optimum level for patient  Description: Interventions:  - Assess and monitor patient  barriers to mobility and need for assistive/adaptive devices  - Assess patient's emotional response to limitations  - Collaborate with interdisciplinary team and initiate plans and interventions as ordered  - Encourage independent activity per ability   - Maintain proper body alignment  - Perform active/passive rom as tolerated/ordered  - Plan activities to conserve energy   - Turn patient as appropriate  Outcome: Progressing     Problem: Communication Impairment  Goal: Ability to express needs and understand communication  Description: Assess patient's communication skills and ability to understand information  Patient will demonstrate use of effective communication techniques, alternative methods of communication and understanding even if not able to speak  - Encourage communication and provide alternate methods of communication as needed  - Collaborate with case management/ for discharge needs  - Include patient/family/caregiver in decisions related to communication  Outcome: Progressing     Problem: Potential for Aspiration  Goal: Non-ventilated patient's risk of aspiration is minimized  Description: Assess and monitor vital signs, respiratory status, and labs (WBC)  Monitor for signs of aspiration (tachypnea, cough, rales, wheezing, cyanosis, fever)  - Assess and monitor patient's ability to swallow  - Place patient up in chair to eat if possible  - HOB up at 90 degrees to eat if unable to get patient up into chair   - Supervise patient during oral intake  - Instruct patient/ family to take small bites  - Instruct patient/ family to take small single sips when taking liquids    - Follow patient-specific strategies generated by speech pathologist   Outcome: Progressing  Goal: Ventilated patient's risk of aspiration is minimized  Description: Assess and monitor vital signs, respiratory status, airway cuff pressure, and labs (WBC)  Monitor for signs of aspiration (tachypnea, cough, rales, wheezing, cyanosis, fever)  - Elevate head of bed 30 degrees if patient has tube feeding   - Monitor tube feeding  Outcome: Progressing     Problem: Nutrition  Goal: Nutrition/Hydration status is improving  Description: Monitor and assess patient's nutrition/hydration status for malnutrition (ex- brittle hair, bruises, dry skin, pale skin and conjunctiva, muscle wasting, smooth red tongue, and disorientation)  Collaborate with interdisciplinary team and initiate plan and interventions as ordered  Monitor patient's weight and dietary intake as ordered or per policy  Utilize nutrition screening tool and intervene per policy  Determine patient's food preferences and provide high-protein, high-caloric foods as appropriate  - Assist patient with eating   - Allow adequate time for meals   - Encourage patient to take dietary supplement as ordered  - Collaborate with clinical nutritionist   - Include patient/family/caregiver in decisions related to nutrition    Outcome: Progressing     Problem: Prexisting or High Potential for Compromised Skin Integrity  Goal: Skin integrity is maintained or improved  Description: INTERVENTIONS:  - Identify patients at risk for skin breakdown  - Assess and monitor skin integrity  - Assess and monitor nutrition and hydration status  - Monitor labs   - Assess for incontinence   - Turn and reposition patient  - Assist with mobility/ambulation  - Relieve pressure over bony prominences  - Avoid friction and shearing  - Provide appropriate hygiene as needed including keeping skin clean and dry  - Evaluate need for skin moisturizer/barrier cream  - Collaborate with interdisciplinary team   - Patient/family teaching  - Consider wound care consult   Outcome: Progressing     Problem: Potential for Falls  Goal: Patient will remain free of falls  Description: INTERVENTIONS:  - Educate patient/family on patient safety including physical limitations  - Instruct patient to call for assistance with activity   - Consult OT/PT to assist with strengthening/mobility   - Keep Call bell within reach  - Keep bed low and locked with side rails adjusted as appropriate  - Keep care items and personal belongings within reach  - Initiate and maintain comfort rounds  - Make Fall Risk Sign visible to staff  - Offer Toileting every 2 Hours, in advance of need  - Initiate/Maintain alarm  - Obtain necessary fall risk management equipment  - Apply yellow socks and bracelet for high fall risk patients  - Consider moving patient to room near nurses station  Outcome: Progressing     Problem: Nutrition/Hydration-ADULT  Goal: Nutrient/Hydration intake appropriate for improving, restoring or maintaining nutritional needs  Description: Monitor and assess patient's nutrition/hydration status for malnutrition  Collaborate with interdisciplinary team and initiate plan and interventions as ordered  Monitor patient's weight and dietary intake as ordered or per policy  Utilize nutrition screening tool and intervene as necessary  Determine patient's food preferences and provide high-protein, high-caloric foods as appropriate       INTERVENTIONS:  - Monitor oral intake, urinary output, labs, and treatment plans  - Assess nutrition and hydration status and recommend course of action  - Evaluate amount of meals eaten  - Assist patient with eating if necessary   - Allow adequate time for meals  - Recommend/ encourage appropriate diets, oral nutritional supplements, and vitamin/mineral supplements  - Order, calculate, and assess calorie counts as needed  - Recommend, monitor, and adjust tube feedings and TPN/PPN based on assessed needs  - Assess need for intravenous fluids  - Provide specific nutrition/hydration education as appropriate  - Include patient/family/caregiver in decisions related to nutrition  Outcome: Progressing

## 2022-04-28 NOTE — ASSESSMENT & PLAN NOTE
Patient is complaining of chronic back pain  Continue Suboxone as per home regimen starting tomorrow  Patient is seen by addition clinic [facility]  Note reviewed    He is supposed to be on 08/02 mg 1 film sublingual t i d

## 2022-04-29 PROBLEM — Z71.89 GOALS OF CARE, COUNSELING/DISCUSSION: Status: ACTIVE | Noted: 2022-04-29

## 2022-04-29 PROBLEM — E23.6 EMPTY SELLA TURCICA (HCC): Status: ACTIVE | Noted: 2022-04-29

## 2022-04-29 LAB
ATRIAL RATE: 93 BPM
P AXIS: 70 DEGREES
PR INTERVAL: 106 MS
QRS AXIS: 37 DEGREES
QRSD INTERVAL: 90 MS
QT INTERVAL: 376 MS
QTC INTERVAL: 467 MS
T WAVE AXIS: 38 DEGREES
VENTRICULAR RATE: 93 BPM

## 2022-04-29 PROCEDURE — 99233 SBSQ HOSP IP/OBS HIGH 50: CPT | Performed by: FAMILY MEDICINE

## 2022-04-29 PROCEDURE — 97110 THERAPEUTIC EXERCISES: CPT

## 2022-04-29 PROCEDURE — 97535 SELF CARE MNGMENT TRAINING: CPT

## 2022-04-29 PROCEDURE — 93010 ELECTROCARDIOGRAM REPORT: CPT | Performed by: STUDENT IN AN ORGANIZED HEALTH CARE EDUCATION/TRAINING PROGRAM

## 2022-04-29 RX ORDER — HEPARIN SODIUM 5000 [USP'U]/ML
5000 INJECTION, SOLUTION INTRAVENOUS; SUBCUTANEOUS EVERY 8 HOURS SCHEDULED
Status: DISCONTINUED | OUTPATIENT
Start: 2022-04-29 | End: 2022-04-30

## 2022-04-29 RX ADMIN — DIAZEPAM 5 MG: 5 TABLET ORAL at 12:23

## 2022-04-29 RX ADMIN — SODIUM CHLORIDE 100 ML/HR: 0.9 INJECTION, SOLUTION INTRAVENOUS at 23:47

## 2022-04-29 RX ADMIN — BUPRENORPHINE AND NALOXONE 8 MG: 8; 2 FILM BUCCAL; SUBLINGUAL at 21:01

## 2022-04-29 RX ADMIN — HEPARIN SODIUM 5000 UNITS: 5000 INJECTION INTRAVENOUS; SUBCUTANEOUS at 21:00

## 2022-04-29 RX ADMIN — BUPRENORPHINE AND NALOXONE 8 MG: 8; 2 FILM BUCCAL; SUBLINGUAL at 09:03

## 2022-04-29 RX ADMIN — SODIUM CHLORIDE 100 ML/HR: 0.9 INJECTION, SOLUTION INTRAVENOUS at 15:13

## 2022-04-29 RX ADMIN — ACETAMINOPHEN 650 MG: 325 TABLET ORAL at 15:09

## 2022-04-29 RX ADMIN — ASPIRIN 81 MG CHEWABLE TABLET 81 MG: 81 TABLET CHEWABLE at 09:03

## 2022-04-29 RX ADMIN — DULOXETINE HYDROCHLORIDE 120 MG: 60 CAPSULE, DELAYED RELEASE ORAL at 09:02

## 2022-04-29 RX ADMIN — TRAZODONE HYDROCHLORIDE 150 MG: 100 TABLET ORAL at 20:59

## 2022-04-29 RX ADMIN — ATORVASTATIN CALCIUM 40 MG: 40 TABLET, FILM COATED ORAL at 15:09

## 2022-04-29 RX ADMIN — LURASIDONE HYDROCHLORIDE 20 MG: 20 TABLET, FILM COATED ORAL at 20:59

## 2022-04-29 RX ADMIN — BUPRENORPHINE AND NALOXONE 8 MG: 8; 2 FILM BUCCAL; SUBLINGUAL at 15:09

## 2022-04-29 RX ADMIN — TAMSULOSIN HYDROCHLORIDE 0.4 MG: 0.4 CAPSULE ORAL at 15:09

## 2022-04-29 RX ADMIN — IBUPROFEN 600 MG: 600 TABLET ORAL at 12:23

## 2022-04-29 NOTE — ASSESSMENT & PLAN NOTE
chronic back pain, evidenced by daily use of Suboxone 8mg three times a day, requiring continued regime in hospital    PT OT recommends rehab placement, but since patient is on Suboxone, unable to find place around this area-case management on board,

## 2022-04-29 NOTE — NURSING NOTE
Tried to get patients vital signs several times every time I go in pt puts his finger up to me telling me to wait  Will try again later

## 2022-04-29 NOTE — ASSESSMENT & PLAN NOTE
Patient had left humeral fracture diagnosed on 04/04/2022 on an ED visit  Still present on imaging      · Orthopedic recommendation appreciated:Patient may not lift more than 1 lb with the left upper extremity  · PT/OT pendulum exercises, active assisted range of motion, below shoulder height left upper extremity  PT OT recommends rehab placement, but since patient is on Suboxone, unable to find place around this area-case management on board,

## 2022-04-29 NOTE — PLAN OF CARE
Problem: MOBILITY - ADULT  Goal: Maintain or return to baseline ADL function  Description: INTERVENTIONS:  -  Assess patient's ability to carry out ADLs; assess patient's baseline for ADL function and identify physical deficits which impact ability to perform ADLs (bathing, care of mouth/teeth, toileting, grooming, dressing, etc )  - Assess/evaluate cause of self-care deficits   - Assess range of motion  - Assess patient's mobility; develop plan if impaired  - Assess patient's need for assistive devices and provide as appropriate  - Encourage maximum independence but intervene and supervise when necessary  - Involve family in performance of ADLs  - Assess for home care needs following discharge   - Consider OT consult to assist with ADL evaluation and planning for discharge  - Provide patient education as appropriate  Outcome: Not Progressing  Goal: Maintains/Returns to pre admission functional level  Description: INTERVENTIONS:  - Perform BMAT or MOVE assessment daily    - Set and communicate daily mobility goal to care team and patient/family/caregiver     - Collaborate with rehabilitation services on mobility goals if consulted  - Out of bed for meals 3 times a day  - Out of bed for toileting  - Record patient progress and toleration of activity level   Outcome: Not Progressing

## 2022-04-29 NOTE — OCCUPATIONAL THERAPY NOTE
Occupational Therapy Progress Note     Patient Name: Aj Harley  [id number] Date: 4/29/2022  Problem List  Principal Problem:    Toxic encephalopathy  Active Problems:    Bipolar affective disorder ([facility] )    Other hyperlipidemia    Back pain    Closed fracture of multiple ribs of left side with routine healing    Left humeral fracture    Opioid dependence ([facility] )    Ecchymosis    Sacral decubitus ulcer, stage III ([place] [id number] )            04/29/22 1415   Note Type   Note Type Treatment   Restrictions/Precautions   Weight Bearing Precautions Per Order Yes   LUE Weight Bearing Per Order NWB   Braces or Orthoses Sling  (PRN for comfort)   Other Precautions Bed Alarm; Fall Risk;Pain;Multiple lines;WBS   Pain Assessment   Pain Assessment Tool FLACC   Pain Location/Orientation Orientation: Left; Location: Shoulder   Pain Rating: FLACC (Rest) - Face 0   Pain Rating: FLACC (Rest) - Legs 0   Pain Rating: FLACC (Rest) - Activity 0   Pain Rating: FLACC (Rest) - Cry 0   Pain Rating: FLACC (Rest) - Consolability 0   Score: FLACC (Rest) 0   Pain Rating: FLACC (Activity) - Face 1   Pain Rating: FLACC (Activity) - Legs 0   Pain Rating: FLACC (Activity) - Activity 0   Pain Rating: FLACC (Activity) - Cry 1   Pain Rating: FLACC (Activity) - Consolability 0   Score: FLACC (Activity) 2   ADL   Grooming Assistance   (SBA)   Grooming Deficit Verbal cueing;Supervision/safety; Increased time to complete;Wash/dry hands   Grooming Comments Pt standing at sinkside while completing hand hygiene  vc'ing for thoroughness and to include washing RUE d/t swelling and slight odor  Pt requiring Min a for thoroughness of RUE   UB Dressing Assistance 4  Minimal Assistance   UB Dressing Deficit Verbal cueing;Supervision/safety; Increased time to complete; Thread RUE   UB Dressing Comments increased time and vc'ing for use of compensatory strategies d/t increased pain in L shoulder  Toileting Assistance    (SBA)   Toileting Deficit Supervison/safety; Increased time to complete;Grab bar use   Toileting Comments Pt completing toileting tasks with use of SPC for UB support  PRN  Pt standing at toilet to urinate and completing CM and pericare @ SBA  Bed Mobility   Supine to Sit 5  Supervision   Additional items Increased time required;Verbal cues   Sit to Supine 5  Supervision   Additional items Increased time required;Verbal cues   Additional Comments HOB slightly elevated to ~15*  use of bedrails PRN  Transfers   Sit to Stand   (SBA)   Additional items Increased time required;Verbal cues   Stand to Sit   (SBA)   Additional items Increased time required;Verbal cues   Stand pivot   (SBA)   Additional items Increased time required;Verbal cues   Additional Comments Pt completing functional transfers with use of SPC for UB support  Pt requiring SBA for safety cues, and occasional swaying   ROM - Left Upper Extremities    L Shoulder   (not tested)   L Elbow Elbow flexion;Elbow extension;AAROM   L Wrist AROM; Wrist flexion;Wrist extension;Radial deviation;Ulnar deviation   L Hand AROM; Thumb; Index finger; Long finger;Ring finger;Little finger   L Position Supine   LUE ROM Comment Pt thoroughly educated on exercises and stretches to complete with LUE in order to decrase swelling and discomfort  Pt completing 2 sets of 10 of finger/wrist exercises exercises with AROM  Pt tolerating well  Pt with slight discomfort with elbow AAROM, tolerating minimal movements at this time  Pt's LUE at end of session was left propped up on pillow to assist with swelling  Pt supine in bed at end of session with call bell inr each, chair alarm intact and all needs met at this time  Cognition   Overall Cognitive Status Impaired   Arousal/Participation Alert; Responsive; Cooperative   Attention Difficulty attending to directions   Following Commands Follows one step commands with increased time or repetition   Comments Pt requires consistnat vc'ing to attend to taska nd remain on topic   Activity Tolerance Activity Tolerance Patient limited by pain   Medical Staff Made Aware Spoke with RN, Cecily Parsons   Assessment   Assessment Pt seen for OT treatment session #1 this date  Pt alert and agreeable to participate at this time  Pt completing ADLs and functional transfers with good participation although limited this date d/t pain in L shoulder  Pt requiring vc'ing for redirection to task and to remain on topic when following commands  Pt demonstrates Good potential to make progress towards goals, although is limited by  decrease activity tolerance, decrease standing balance, decrease performance during ADL tasks, decrease cognition, decrease safety awareness , increase impulsiveness, decrease UB MS, increased pain, decrease generalized strength, decrease activity engagement, decrease performance during functional transfers, high fall risk and limited insight to deficits  Pt would benefit from continued acute OT services to address deficits as well as HHOT vs post acute rehab upon d/c from [facility]  Plan   Treatment Interventions ADL retraining;Functional transfer training;UE strengthening/ROM; Endurance training;Cognitive reorientation;Patient/family training;Neuromuscular reeducation;Equipment evaluation/education; Fine motor coordination activities; Compensatory technique education;Continued evaluation; Energy conservation; Activityengagement   Goal Expiration Date 05/12/22   OT Treatment Day 1   OT Frequency 3-5x/wk   Recommendation   OT Discharge Recommendation   (HHOT vs post acute rehab pending cognition clearing up)   AM-PAC Daily Activity Inpatient   Lower Body Dressing 3   Bathing 3   Toileting 3   Upper Body Dressing 3   Grooming 3   Eating 4   Daily Activity Raw Score 19   Daily Activity Standardized Score (Calc for Raw Score >=11) 40 22   AM-PAC Applied Cognition Inpatient   Following a Speech/Presentation 2   Understanding Ordinary Conversation 3   Taking Medications 2   Remembering Where Things Are Placed or Put Away 3 Remembering List of 4-5 Errands 2   Taking Care of Complicated Tasks 2   Applied Cognition Raw Score 14   Applied Cognition Standardized Score 32 02     Pt goals to be met by 5/12/2022      1  Pt will demonstrate ability to complete grooming/hygiene tasks @ Mary after set-up  2  Pt will demonstrate ability to complete supine<>sit @ Mary in order to increase safety and independence during ADL tasks  3  Pt will demonstrate ability to complete UB ADLs including washing/dressing @ Mary in order to increase performance and participation during meaningful tasks  4  Pt will demonstrate ability to complete LB dressing @ Mary in order to increase safety and independence during meaningful tasks  5  Pt will demonstrate ability to declan/doff socks/shoes while sitting EOB @ Mary in order to increase safety and independence during meaningful tasks  6  Pt will demonstrate ability to complete toileting tasks including CM and pericare @ Mary in order to increase safety and independence during meaningful tasks  7  Pt will demonstrate ability to complete EOB, chair, toilet/commode transfers @ Mary in order to increase performance and participation during functional tasks  8  Pt will demonstrate ability to stand for 6 minutes while maintaining F+ balance with use of cane for UB support PRN  9  Pt will demonstrate ability to tolerate 30-35 minute OT session with no vc'ing for deep breathing or use of energy conservation techniques in order to increase activity tolerance during functional tasks  10  Pt will demonstrate Good carryover of use of energy conservation/compensatory strategies during ADLs and functional tasks in order to increase safety and reduce risk for falls  11  Pt will demonstrate Good attention and participation in continued evaluation of functional ambulation house hold distances in order to assist with safe d/c planning    12  Pt will attend to continued cognitive assessments 100% of the time in order to provide most appropriate d/c recommendations  13  Pt will follow 100% simple 2-step commands and be A&O x4 consistently with environmental cues to increase participation in functional activities  14  Pt will identify 3 areas of interest/hobbies and 1 intervention on how to incorporate into daily life in order to increase interaction with environment and peers as well as increase participation in meaningful tasks     15  Pt will demonstrate 100% carryover of BUE HEP in order to increase BUE MS and increase performance during functional tasks upon d/c home      Joni Mcburney, OTR/L

## 2022-04-29 NOTE — ASSESSMENT & PLAN NOTE
Patient is complaining of chronic back pain  Continue Suboxone as per home regimen starting tomorrow  Patient is seen by addition clinic [facility]  Note reviewed    He is supposed to be on 08/02 mg 1 film sublingual t i d

## 2022-04-29 NOTE — PROGRESS NOTES
[address]  Progress Note - Rodney East 1960, 63 y o  male MRN: [id number]  Unit/Bed#: MS [facility] Encounter: [id number]  Primary Care Provider: Naomi Herndon MD   Date and time admitted to hospital: 4/25/2022 10:18 AM    * Toxic encephalopathy  Assessment & Plan  Most likely secondary to Suboxone and Valium  MRI of the brain also shows empty sella  Patient noted to have worsening confusion, yelling for help,  Found on the floor by neighbors  Found to be incontinent of stool  Currently in the ED he is having episodes of somnolence however is arousable for few minutes and then he drifts back to sleep  Urine drug screen is positive for benzos  Patient is chronically on Valium 10 mg 3 times daily and also on Suboxone 8/2 mg 1 sublingual film 3 times daily  Unclear whether he was abusing medications are withdrawing from medications  CT brain and CTa head and neck reviewed  No acute pathology noted  Continue neuro checks for now   Resume home medications  unless he becomes more awake later tonight and wants to take his Valium and Suboxone and that would be okay  Differential at this time includes drug withdrawal versus intoxication, seizure, less likely stroke  Neurology consult appreciated  PT OT recommends to discharge patient on rehab-since patient is on Suboxone, which can affect the discharge planning and placement  Case management on board      Empty sella turcica ([facility] )  Assessment & Plan  TSH normal, does not have any visual change  Patient will need outpatient follow-up with Neurology and Ophthalmology-as per Neurology recommendation    Goals of care, counseling/discussion  Assessment & Plan  Discussion has done informed of patient's, with patient's son, and case management  As per neuropsych, patient is incapable to take informed decision    After discussion has done, patient and family members agrees to go to rehab-but patient is on Suboxone, which may affect the discharge planning and placement  Sacral decubitus ulcer, stage III ([facility])  Assessment & Plan  Continue wound care  Follow wound culture  Will hold off antibiotics at this time    Ecchymosis  Assessment & Plan  Affecting left flank, left lateral chest, left upper thigh-hemoglobin is slowly trending down, will hold heparin at this time, and monitor    Opioid dependence [facility])  Assessment & Plan  chronic back pain, evidenced by daily use of Suboxone 8mg three times a day, requiring continued regime in hospital    PT OT recommends rehab placement, but since patient is on Suboxone, unable to find place around this area-case management on board,      Left humeral fracture  Assessment & Plan  Patient had left humeral fracture diagnosed on 04/04/2022 on an ED visit  Still present on imaging  · Orthopedic recommendation appreciated:Patient may not lift more than 1 lb with the left upper extremity  · PT/OT pendulum exercises, active assisted range of motion, below shoulder height left upper extremity  PT OT recommends rehab placement, but since patient is on Suboxone, unable to find place around this area-case management on board,      Closed fracture of multiple ribs of left side with routine healing  Assessment & Plan  CT chest and pelvis shows There are fractures of the left 6, 7th, 8th, and 9th ribs with reactive bone formation present suggesting that these fractures are subacute or less likely chronic  Continue pain control  These do not appear to be acute fractures and hence continue supportive care  PT OT recommends rehab placement, but since patient is on Suboxone, unable to find place around this area-case management on board,    Back pain  Assessment & Plan  Patient is complaining of chronic back pain  Continue Suboxone as per home regimen starting tomorrow  Patient is seen by addition [facility]  Note reviewed    He is supposed to be on 08/02 mg 1 film sublingual t i d     Other hyperlipidemia  Assessment & Plan  Continue statin therapy    Bipolar affective disorder [facility])  Assessment & Plan  Continue home regimen of Valium, Cymbalta and Latuda  Psychiatry consult appreciated          VTE Pharmacologic Prophylaxis: VTE Score: 2 Moderate Risk (Score 3-4) - Pharmacological DVT Prophylaxis Ordered: heparin  Patient Centered Rounds: I performed bedside rounds with nursing staff today  Discussions with Specialists or Other Care Team Provider:  Neurology    Education and Discussions with Family / Patient: Updated  (son) at bedside  Time Spent for Care: 30 minutes  More than 50% of total time spent on counseling and coordination of care as described above  Current Length of Stay: 4 day(s)  Current Patient Status: Inpatient   Certification Statement: The patient will continue to require additional inpatient hospital stay due to To monitor above condition  Discharge Plan: Anticipate discharge in 24-48 hrs to rehab facility  Code Status: Level 1 - Full Code    Subjective:   Seen and evaluated during the round  Patient resting comfortably  Denies any significant complaint  Objective:     Vitals:   Temp (24hrs), Av °F (36 7 °C), Min:97 8 °F (36 6 °C), Max:98 4 °F (36 9 °C)    Temp:  [97 8 °F (36 6 °C)-98 4 °F (36 9 °C)] 98 4 °F (36 9 °C)  HR:  [] 91  Resp:  [16-20] 18  BP: (113-136)/(70-88) 113/75  SpO2:  [93 %-98 %] 97 %  Body mass index is 26 01 kg/m²  Input and Output Summary (last 24 hours): Intake/Output Summary (Last 24 hours) at 2022 1915  Last data filed at 2022 1712  Gross per 24 hour   Intake 2235 ml   Output 2125 ml   Net 110 ml       Physical Exam:   Physical Exam  Vitals and nursing note reviewed  Constitutional:       Appearance: Normal appearance  He is not toxic-appearing or diaphoretic  HENT:      Nose: No congestion or rhinorrhea  Mouth/Throat:      Mouth: Mucous membranes are moist       Pharynx: Oropharynx is clear  No oropharyngeal exudate or posterior oropharyngeal erythema  Eyes:      General: No scleral icterus  Extraocular Movements: Extraocular movements intact  Conjunctiva/sclera: Conjunctivae normal       Pupils: Pupils are equal, round, and reactive to light  Cardiovascular:      Rate and Rhythm: Normal rate  Heart sounds: No murmur heard  No friction rub  No gallop  Pulmonary:      Effort: Pulmonary effort is normal  No respiratory distress  Breath sounds: No stridor  No wheezing or rhonchi  Abdominal:      General: Abdomen is flat  Bowel sounds are normal  There is no distension  Palpations: There is no mass  Tenderness: There is no abdominal tenderness  Hernia: No hernia is present  Musculoskeletal:         General: Tenderness (Multiple sites of the body) present  No swelling  Cervical back: Normal range of motion  No rigidity or tenderness  Right lower leg: No edema  Left lower leg: No edema  Lymphadenopathy:      Cervical: No cervical adenopathy  Skin:     General: Skin is warm  Neurological:      General: No focal deficit present  Mental Status: He is alert and oriented to person, place, and time  Mental status is at baseline  Cranial Nerves: No cranial nerve deficit  Sensory: No sensory deficit  Motor: No weakness        Coordination: Coordination normal          Additional Data:     Labs:  Results from last 7 days   Lab Units 04/28/22  1537   WBC Thousand/uL 4 37   HEMOGLOBIN g/dL 9 2*   HEMATOCRIT % 27 5*   PLATELETS Thousands/uL 364   NEUTROS PCT % 61   LYMPHS PCT % 21   MONOS PCT % 7   EOS PCT % 10*     Results from last 7 days   Lab Units 04/26/22  0430   SODIUM mmol/L 139   POTASSIUM mmol/L 3 5   CHLORIDE mmol/L 105   CO2 mmol/L 26   BUN mg/dL 11   CREATININE mg/dL 0 67   ANION GAP mmol/L 8   CALCIUM mg/dL 8 1*   ALBUMIN g/dL 2 9*   TOTAL BILIRUBIN mg/dL 0 48   ALK PHOS U/L 96   ALT U/L 21   AST U/L 17   GLUCOSE RANDOM mg/dL 100     Results from last 7 days   Lab Units 04/25/22  1047   INR  0 98     Results from last 7 days   Lab Units 04/25/22  1044   POC GLUCOSE mg/dl 129     Results from last 7 days   Lab Units 04/26/22  0430   HEMOGLOBIN A1C % 5 0     Results from last 7 days   Lab Units 04/25/22  1046   LACTIC ACID mmol/L 0 7       Lines/Drains:  Invasive Devices  Report    Peripheral Intravenous Line            Peripheral IV 04/26/22 Right Wrist 3 days    Peripheral IV 04/27/22 Right;Ventral (anterior) Forearm 1 day                      Imaging: No pertinent imaging reviewed  Recent Cultures (last 7 days):   Results from last 7 days   Lab Units 04/27/22  1212 04/25/22  1121 04/25/22  1053   BLOOD CULTURE   --  No Growth After 4 Days  No Growth After 4 Days     GRAM STAIN RESULT  No polys seen*  Rare Gram positive cocci in pairs*  Rare Gram positive rods*  --   --    WOUND CULTURE  3+ Growth of Escherichia coli*  3+ Growth of   --   --        Last 24 Hours Medication List:   Current Facility-Administered Medications   Medication Dose Route Frequency Provider Last Rate    acetaminophen  650 mg Oral Q6H PRN Heraclio Jolley MD      aspirin  81 mg Oral Daily Heraclio Jolley MD      atorvastatin  40 mg Oral QPM Heraclio Jolley MD      buprenorphine-naloxone  8 mg Sublingual TID Heraclio Jolley MD      calcium carbonate  1,000 mg Oral Daily PRN Heraclio Jolley MD      diazepam  5 mg Oral Q8H PRN Heraclio Jolley MD      DULoxetine  120 mg Oral Daily Heraclio Jolley MD      heparin (porcine)  5,000 Units Subcutaneous [facility] Steven Nguyen MD      ibuprofen  600 mg Oral Q6H PRN Steven Nguyen MD      lidocaine  1 patch Topical Daily Steven Nguyen MD      lurasidone  20 mg Oral HS Heraclio Jolley MD      nicotine  1 patch Transdermal Daily Heraclio Jolley MD      ondansetron  4 mg Intravenous Q6H PRN Heraclio Jolley MD      sodium chloride  100 mL/hr Intravenous Continuous Heraclio Jolley  mL/hr (04/29/22 1513)    tamsulosin  0 4 mg Oral Daily With Fela Reddy MD      traZODone  150 mg Oral HS Brenna Abbasi MD          Today, Patient Was Seen By: Carlos Alberto Hickey MD    **Please Note: This note may have been constructed using a voice recognition system  **

## 2022-04-29 NOTE — ASSESSMENT & PLAN NOTE
Most likely secondary to Suboxone and Valium  MRI of the brain also shows empty sella  Patient noted to have worsening confusion, yelling for help,  Found on the floor by neighbors  Found to be incontinent of stool  Currently in the ED he is having episodes of somnolence however is arousable for few minutes and then he drifts back to sleep  Urine drug screen is positive for benzos  Patient is chronically on Valium 10 mg 3 times daily and also on Suboxone 8/2 mg 1 sublingual film 3 times daily  Unclear whether he was abusing medications are withdrawing from medications  CT brain and CTa head and neck reviewed  No acute pathology noted  Continue neuro checks for now   Resume home medications  unless he becomes more awake later tonight and wants to take his Valium and Suboxone and that would be okay  Differential at this time includes drug withdrawal versus intoxication, seizure, less likely stroke  Neurology consult appreciated  PT OT recommends to discharge patient on rehab-since patient is on Suboxone, which can affect the discharge planning and placement    Case management on board

## 2022-04-29 NOTE — PLAN OF CARE
Problem: MOBILITY - ADULT  Goal: Maintain or return to baseline ADL function  Description: INTERVENTIONS:  -  Assess patient's ability to carry out ADLs; assess patient's baseline for ADL function and identify physical deficits which impact ability to perform ADLs (bathing, care of mouth/teeth, toileting, grooming, dressing, etc )  - Assess/evaluate cause of self-care deficits   - Assess range of motion  - Assess patient's mobility; develop plan if impaired  - Assess patient's need for assistive devices and provide as appropriate  - Encourage maximum independence but intervene and supervise when necessary  - Involve family in performance of ADLs  - Assess for home care needs following discharge   - Consider OT consult to assist with ADL evaluation and planning for discharge  - Provide patient education as appropriate  Outcome: Progressing  Goal: Maintains/Returns to pre admission functional level  Description: INTERVENTIONS:  - Perform BMAT or MOVE assessment daily    - Set and communicate daily mobility goal to care team and patient/family/caregiver     - Collaborate with rehabilitation services on mobility goals if consulted  - Out of bed for toileting  - Record patient progress and toleration of activity level   Outcome: Progressing    Problem: PAIN - ADULT  Goal: Verbalizes/displays adequate comfort level or baseline comfort level  Description: Interventions:  - Encourage patient to monitor pain and request assistance  - Assess pain using appropriate pain scale  - Administer analgesics based on type and severity of pain and evaluate response  - Implement non-pharmacological measures as appropriate and evaluate response  - Consider cultural and social influences on pain and pain management  - Notify physician/advanced practitioner if interventions unsuccessful or patient reports new pain  Outcome: Progressing     Problem: INFECTION - ADULT  Goal: Absence or prevention of progression during hospitalization  Description: INTERVENTIONS:  - Assess and monitor for signs and symptoms of infection  - Monitor lab/diagnostic results  - Monitor all insertion sites, i e  indwelling lines, tubes, and drains  - Monitor endotracheal if appropriate and nasal secretions for changes in amount and color  - [place] appropriate cooling/warming therapies per order  - Administer medications as ordered  - Instruct and encourage patient and family to use good hand hygiene technique  - Identify and instruct in appropriate isolation precautions for identified infection/condition  Outcome: Progressing  Goal: Absence of fever/infection during neutropenic period  Description: INTERVENTIONS:  - Monitor WBC    Outcome: Progressing     Problem: DISCHARGE PLANNING  Goal: Discharge to home or other facility with appropriate resources  Description: INTERVENTIONS:  - Identify barriers to discharge w/patient and caregiver  - Arrange for needed discharge resources and transportation as appropriate  - Identify discharge learning needs (meds, wound care, etc )  - Arrange for interpretive services to assist at discharge as needed  - Refer to Case Management Department for coordinating discharge planning if the patient needs post-hospital services based on physician/advanced practitioner order or complex needs related to functional status, cognitive ability, or social support system  Outcome: Progressing     Problem: Knowledge Deficit  Goal: Patient/family/caregiver demonstrates understanding of disease process, treatment plan, medications, and discharge instructions  Description: Complete learning assessment and assess knowledge base    Interventions:  - Provide teaching at level of understanding  - Provide teaching via preferred learning methods  Outcome: Progressing     Problem: NEUROSENSORY - ADULT  Goal: Achieves stable or improved neurological status  Description: INTERVENTIONS  - Monitor and report changes in neurological status  - Monitor vital signs such as temperature, blood pressure, glucose, and any other labs ordered   - Initiate measures to prevent increased intracranial pressure  - Monitor for seizure activity and implement precautions if appropriate      Outcome: Progressing  Goal: Remains free of injury related to seizures activity  Description: INTERVENTIONS  - Maintain airway, patient safety  and administer oxygen as ordered  - Monitor patient for seizure activity, document and report duration and description of seizure to physician/advanced practitioner  - If seizure occurs,  ensure patient safety during seizure  - Reorient patient post seizure  - Seizure pads on all 4 side rails  - Instruct patient/family to notify RN of any seizure activity including if an aura is experienced  - Instruct patient/family to call for assistance with activity based on nursing assessment  - Administer anti-seizure medications if ordered    Outcome: Progressing  Goal: Achieves maximal functionality and self care  Description: INTERVENTIONS  - Monitor swallowing and airway patency with patient fatigue and changes in neurological status  - Encourage and assist patient to increase activity and self care     - Encourage visually impaired, hearing impaired and aphasic patients to use assistive/communication devices  Outcome: Progressing     Problem: MUSCULOSKELETAL - ADULT  Goal: Maintain or return mobility to safest level of function  Description: INTERVENTIONS:  - Assess patient's ability to carry out ADLs; assess patient's baseline for ADL function and identify physical deficits which impact ability to perform ADLs (bathing, care of mouth/teeth, toileting, grooming, dressing, etc )  - Assess/evaluate cause of self-care deficits   - Assess range of motion  - Assess patient's mobility  - Assess patient's need for assistive devices and provide as appropriate  - Encourage maximum independence but intervene and supervise when necessary  - Involve family in performance of ADLs  - Assess for home care needs following discharge   - Consider OT consult to assist with ADL evaluation and planning for discharge  - Provide patient education as appropriate  Outcome: Progressing  Goal: Maintain proper alignment of affected body part  Description: INTERVENTIONS:  - Support, maintain and protect limb and body alignment  - Provide patient/ family with appropriate education  Outcome: Progressing     Problem: SKIN/TISSUE INTEGRITY - ADULT  Goal: Incision(s), wounds(s) or drain site(s) healing without S/S of infection  Description: INTERVENTIONS  - Assess and document dressing, incision, wound bed, drain sites and surrounding tissue  - Provide patient and family education  - Perform skin care/dressing changes   Outcome: Progressing  Goal: Pressure injury heals and does not worsen  Description: Interventions:  - Implement low air loss mattress or specialty surface (Criteria met)  - Apply silicone foam dressing  - Use special pressure reducing interventions such as cushion when in chair   - Utilize friction reducing device or surface for transfers   - Consider nutrition services referral as needed  Outcome: Progressing      Problem: Neurological Deficit  Goal: Neurological status is stable or improving  Description: Interventions:  - Monitor and assess patient's level of consciousness, motor function, sensory function, and level of assistance needed for ADLs  - Monitor and report changes from baseline  Collaborate with interdisciplinary team to initiate plan and implement interventions as ordered  - Provide and maintain a safe environment  - Consider seizure precautions  - Consider fall precautions  - Consider aspiration precautions  - Consider bleeding precautions    Outcome: Progressing     Problem: Potential for Falls  Goal: Patient will remain free of falls  Description: INTERVENTIONS:  - Educate patient/family on patient safety including physical limitations  - Instruct patient to call for assistance with activity   - Consult OT/PT to assist with strengthening/mobility   - Keep Call bell within reach  - Keep bed low and locked with side rails adjusted as appropriate  - Keep care items and personal belongings within reach  - Initiate and maintain comfort rounds  - Make Fall Risk Sign visible to staff  - Apply yellow socks and bracelet for high fall risk patients  - Consider moving patient to room near nurses station  Outcome: Progressing

## 2022-04-29 NOTE — PLAN OF CARE
Problem: OCCUPATIONAL THERAPY ADULT  Goal: Performs self-care activities at highest level of function for planned discharge setting  See evaluation for individualized goals  Description: Treatment Interventions: ADL retraining,Functional transfer training,UE strengthening/ROM,Endurance training,Cognitive reorientation,Patient/family training,Neuromuscular reeducation,Equipment evaluation/education,Fine motor coordination activities,Compensatory technique education,Continued evaluation,Energy conservation,Activityengagement          See flowsheet documentation for full assessment, interventions and recommendations  Outcome: Progressing  Note: Limitation: Decreased ADL status,Decreased UE ROM,Decreased UE strength,Decreased Safe judgement during ADL,Decreased cognition,Decreased endurance,Decreased self-care trans,Decreased high-level ADLs  Prognosis: Fair  Assessment: Pt seen for OT treatment session #1 this date  Pt alert and agreeable to participate at this time  Pt completing ADLs and functional transfers with good participation although limited this date d/t pain in L shoulder  Pt requiring vc'ing for redirection to task and to remain on topic when following commands  Pt demonstrates Good potential to make progress towards goals, although is limited by  decrease activity tolerance, decrease standing balance, decrease performance during ADL tasks, decrease cognition, decrease safety awareness , increase impulsiveness, decrease UB MS, increased pain, decrease generalized strength, decrease activity engagement, decrease performance during functional transfers, high fall risk and limited insight to deficits  Pt would benefit from continued acute OT services to address deficits as well as HHOT vs post acute rehab upon d/c from [address] [place] [address]       OT Discharge Recommendation: (S)  (HHOT vs post acute rehab pending cognition clearing up)

## 2022-04-29 NOTE — ASSESSMENT & PLAN NOTE
Discussion has done informed of patient's, with patient's son, and case management  As per neuropsych, patient is incapable to take informed decision  After discussion has done, patient and family members agrees to go to rehab-but patient is on Suboxone, which may affect the discharge planning and placement

## 2022-04-29 NOTE — ASSESSMENT & PLAN NOTE
CT chest and pelvis shows There are fractures of the left 6, 7th, 8th, and 9th ribs with reactive bone formation present suggesting that these fractures are subacute or less likely chronic  Continue pain control    These do not appear to be acute fractures and hence continue supportive care  PT OT recommends rehab placement, but since patient is on Suboxone, unable to find place around this area-case management on board,

## 2022-04-29 NOTE — ASSESSMENT & PLAN NOTE
TSH normal, does not have any visual change  Patient will need outpatient follow-up with Neurology and Ophthalmology-as per Neurology recommendation

## 2022-04-30 ENCOUNTER — APPOINTMENT (INPATIENT)
Dept: NON INVASIVE DIAGNOSTICS | Facility: HOSPITAL | Age: 62
DRG: 091 | End: 2022-04-30
Payer: MEDICARE

## 2022-04-30 PROBLEM — M79.89 SWELLING OF LEFT UPPER EXTREMITY: Status: ACTIVE | Noted: 2022-04-30

## 2022-04-30 LAB
BACTERIA BLD CULT: NORMAL
BACTERIA BLD CULT: NORMAL

## 2022-04-30 PROCEDURE — 99233 SBSQ HOSP IP/OBS HIGH 50: CPT | Performed by: FAMILY MEDICINE

## 2022-04-30 PROCEDURE — 93971 EXTREMITY STUDY: CPT

## 2022-04-30 RX ORDER — SACCHAROMYCES BOULARDII 250 MG
250 CAPSULE ORAL 2 TIMES DAILY
Status: DISCONTINUED | OUTPATIENT
Start: 2022-04-30 | End: 2022-05-09 | Stop reason: HOSPADM

## 2022-04-30 RX ORDER — HEPARIN SODIUM 5000 [USP'U]/ML
5000 INJECTION, SOLUTION INTRAVENOUS; SUBCUTANEOUS EVERY 8 HOURS SCHEDULED
Status: DISCONTINUED | OUTPATIENT
Start: 2022-04-30 | End: 2022-05-09 | Stop reason: HOSPADM

## 2022-04-30 RX ORDER — AMOXICILLIN AND CLAVULANATE POTASSIUM 875; 125 MG/1; MG/1
1 TABLET, FILM COATED ORAL EVERY 12 HOURS SCHEDULED
Status: COMPLETED | OUTPATIENT
Start: 2022-04-30 | End: 2022-05-06

## 2022-04-30 RX ADMIN — TRAZODONE HYDROCHLORIDE 150 MG: 100 TABLET ORAL at 20:37

## 2022-04-30 RX ADMIN — SODIUM CHLORIDE 100 ML/HR: 0.9 INJECTION, SOLUTION INTRAVENOUS at 19:45

## 2022-04-30 RX ADMIN — AMOXICILLIN AND CLAVULANATE POTASSIUM 1 TABLET: 875; 125 TABLET, FILM COATED ORAL at 09:35

## 2022-04-30 RX ADMIN — LURASIDONE HYDROCHLORIDE 20 MG: 20 TABLET, FILM COATED ORAL at 20:38

## 2022-04-30 RX ADMIN — BUPRENORPHINE AND NALOXONE 8 MG: 8; 2 FILM BUCCAL; SUBLINGUAL at 16:49

## 2022-04-30 RX ADMIN — AMOXICILLIN AND CLAVULANATE POTASSIUM 1 TABLET: 875; 125 TABLET, FILM COATED ORAL at 20:37

## 2022-04-30 RX ADMIN — ATORVASTATIN CALCIUM 40 MG: 40 TABLET, FILM COATED ORAL at 16:49

## 2022-04-30 RX ADMIN — BUPRENORPHINE AND NALOXONE 8 MG: 8; 2 FILM BUCCAL; SUBLINGUAL at 20:37

## 2022-04-30 RX ADMIN — DIAZEPAM 5 MG: 5 TABLET ORAL at 19:44

## 2022-04-30 RX ADMIN — TAMSULOSIN HYDROCHLORIDE 0.4 MG: 0.4 CAPSULE ORAL at 16:49

## 2022-04-30 RX ADMIN — SODIUM CHLORIDE 100 ML/HR: 0.9 INJECTION, SOLUTION INTRAVENOUS at 10:02

## 2022-04-30 RX ADMIN — ASPIRIN 81 MG CHEWABLE TABLET 81 MG: 81 TABLET CHEWABLE at 09:36

## 2022-04-30 RX ADMIN — HEPARIN SODIUM 5000 UNITS: 5000 INJECTION INTRAVENOUS; SUBCUTANEOUS at 05:11

## 2022-04-30 RX ADMIN — HEPARIN SODIUM 5000 UNITS: 5000 INJECTION INTRAVENOUS; SUBCUTANEOUS at 16:49

## 2022-04-30 RX ADMIN — Medication 250 MG: at 09:35

## 2022-04-30 RX ADMIN — Medication 250 MG: at 16:49

## 2022-04-30 RX ADMIN — HEPARIN SODIUM 5000 UNITS: 5000 INJECTION INTRAVENOUS; SUBCUTANEOUS at 20:37

## 2022-04-30 RX ADMIN — DULOXETINE HYDROCHLORIDE 120 MG: 60 CAPSULE, DELAYED RELEASE ORAL at 09:36

## 2022-04-30 RX ADMIN — BUPRENORPHINE AND NALOXONE 8 MG: 8; 2 FILM BUCCAL; SUBLINGUAL at 09:35

## 2022-04-30 NOTE — ASSESSMENT & PLAN NOTE
Continue wound care  Follow wound culture  Wound culture shows E coli, Gram-positive rods, Gram-positive cocci-sensitive to Augmentin, started Augmentin for 7 days

## 2022-04-30 NOTE — ASSESSMENT & PLAN NOTE
Patient is complaining of chronic back pain  Continue Suboxone as per home regimen starting tomorrow  Patient is seen by addiction clinic [facility]  Note reviewed    He is supposed to be on 08/02 mg 1 film sublingual t i d

## 2022-04-30 NOTE — RESPIRATORY THERAPY NOTE
RT Protocol Note  Joni Hauser 63 y o  male MRN: [id number]  Unit/Bed#: -01 Encounter: [id number]    Assessment    Principal Problem:    Toxic encephalopathy  Active Problems:    Bipolar affective disorder ([facility] )    Other hyperlipidemia    Back pain    Closed fracture of multiple ribs of left side with routine healing    Left humeral fracture    Opioid dependence ([place] [address] )    Ecchymosis    Sacral decubitus ulcer, stage III ([facility])    Goals of care, counseling/discussion    Empty sella turcica ([place] [address] )      Home Pulmonary Medications:  none       Past Medical History:   Diagnosis Date    Narcotic dependence ([place] [address] )      Social History     Socioeconomic History    Marital status:      Spouse name: None    Number of children: None    Years of education: None    Highest education level: None   Occupational History    None   Tobacco Use    Smoking status: Current Every Day Smoker     Packs/day: 0 25     Years: 20 00     Pack years: 5 00     Types: Cigarettes    Smokeless tobacco: Never Used   Vaping Use    Vaping Use: Never used   Substance and Sexual Activity    Alcohol use: Never    Drug use: Never    Sexual activity: Not Currently   Other Topics Concern    None   Social History Narrative    , 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  Social Determinants of Health     Financial Resource Strain: Not on file   Food Insecurity: No Food Insecurity    Worried About Running Out of Food in the Last Year: Never true    [place] of Food in the Last Year: Never true   Transportation Needs: Unmet Transportation Needs    Lack of Transportation (Medical):  Yes    Lack of Transportation (Non-Medical): Yes   Physical Activity: Not on file   Stress: Not on file   Social Connections: Not on file   Intimate Partner Violence: Not on file   Housing Stability: [address] Unable to Pay for Housing in the Last Year: No    Number of [place] in the Last Year: 1    Unstable Housing in the Last Year: No       Subjective         Objective    Physical Exam:   Assessment Type: Pre-treatment  General Appearance: Alert,Awake  Respiratory Pattern: Normal  Chest Assessment: Chest expansion symmetrical  Bilateral Breath Sounds: Diminished,Clear  Cough: None    Vitals:  Blood pressure 127/77, pulse 85, temperature 98 4 °F (36 9 °C), resp  rate 15, height 6' (1 829 m), weight 87 kg (191 lb 12 8 oz), SpO2 94 %  Imaging and other studies: Atelectasis in the posterior mid and lower lung zones  Subtle linear densities in the right upper lobe consistent with atelectasis/scarring  Scarring in the medial aspect of the superior segment right lower           Plan       Airway Clearance Plan: Incentive Spirometer     Resp Comments: Patient requested he ba allowed to sleep    He ios capable of perfroming IS on his own and was encouraged to do so

## 2022-04-30 NOTE — PLAN OF CARE
Problem: MOBILITY - ADULT  Goal: Maintain or return to baseline ADL function  Description: INTERVENTIONS:  -  Assess patient's ability to carry out ADLs; assess patient's baseline for ADL function and identify physical deficits which impact ability to perform ADLs (bathing, care of mouth/teeth, toileting, grooming, dressing, etc )  - Assess/evaluate cause of self-care deficits   - Assess range of motion  - Assess patient's mobility; develop plan if impaired  - Assess patient's need for assistive devices and provide as appropriate  - Encourage maximum independence but intervene and supervise when necessary  - Involve family in performance of ADLs  - Assess for home care needs following discharge   - Consider OT consult to assist with ADL evaluation and planning for discharge  - Provide patient education as appropriate  Outcome: Progressing  Goal: Maintains/Returns to pre admission functional level  Description: INTERVENTIONS:  - Perform BMAT or MOVE assessment daily    - Set and communicate daily mobility goal to care team and patient/family/caregiver     - Collaborate with rehabilitation services on mobility goals if consulted  - Out of bed for meals 3 times a day  - Out of bed for toileting  - Record patient progress and toleration of activity level   Outcome: Progressing

## 2022-04-30 NOTE — ASSESSMENT & PLAN NOTE
Most likely secondary to Suboxone and Valium  MRI of the brain also shows empty sella  Patient noted to have worsening confusion, yelling for help,  Found on the floor by neighbors  Found to be incontinent of stool  Currently in the ED he is having episodes of somnolence however is arousable for few minutes and then he drifts back to sleep  Urine drug screen is positive for benzos  Patient is chronically on Valium 10 mg 3 times daily and also on Suboxone 8/2 mg 1 sublingual film 3 times daily  Unclear whether he was abusing medications are withdrawing from medications  CT brain and CTa head and neck reviewed  No acute pathology noted  Resume home medications    Neurology consult appreciated  PT OT recommends to discharge patient on rehab-since patient is on Suboxone, which can affect the discharge planning and placement    Case management on board

## 2022-04-30 NOTE — PROGRESS NOTES
[address]  Progress Note - [facility] 1960, 63 y o  male MRN: [id number]  Unit/Bed#: MS [facility] Encounter: [id number]  Primary Care Provider: Genesis Suarez MD   Date and time admitted to hospital: 4/25/2022 10:18 AM    * Toxic encephalopathy  Assessment & Plan  Most likely secondary to Suboxone and Valium  MRI of the brain also shows empty sella  Patient noted to have worsening confusion, yelling for help,  Found on the floor by neighbors  Found to be incontinent of stool  Currently in the ED he is having episodes of somnolence however is arousable for few minutes and then he drifts back to sleep  Urine drug screen is positive for benzos  Patient is chronically on Valium 10 mg 3 times daily and also on Suboxone 8/2 mg 1 sublingual film 3 times daily  Unclear whether he was abusing medications are withdrawing from medications  CT brain and CTa head and neck reviewed  No acute pathology noted  Resume home medications    Neurology consult appreciated  PT OT recommends to discharge patient on rehab-since patient is on Suboxone, which can affect the discharge planning and placement  Case management on board      Swelling of left upper extremity  Assessment & Plan  Unknown etiology  Patient does have history of left humeral fracture-and left upper extremity on triangular sling for immobilization  Check venous duplex of left upper extremity to rule out DVT    Empty sella turcica (HCC)  Assessment & Plan  TSH normal, does not have any visual change  Patient will need outpatient follow-up with Neurology and Ophthalmology-as per Neurology recommendation    Goals of care, counseling/discussion  Assessment & Plan  Discussion has done informed of patient's, with patient's son, and case management  As per neuropsych, patient is incapable to take informed decision    After discussion has done, patient and family members agrees to go to rehab-but patient is on Suboxone, which may affect the discharge planning and placement  Sacral decubitus ulcer, stage III ([address] )  Assessment & Plan  Continue wound care  Follow wound culture  Wound culture shows E coli, Gram-positive rods, Gram-positive cocci-sensitive to Augmentin, started Augmentin for 7 days  Ecchymosis  Assessment & Plan  Affecting left flank, left lateral chest, left upper thigh-remained stable    Opioid dependence (HCC)  Assessment & Plan  chronic back pain, evidenced by daily use of Suboxone 8mg three times a day, requiring continued regime in hospital    PT OT recommends rehab placement, but since patient is on Suboxone, unable to find place around this area-case management on board,      Left humeral fracture  Assessment & Plan  Patient had left humeral fracture diagnosed on 04/04/2022 on an ED visit  Still present on imaging  · Orthopedic recommendation appreciated:Patient may not lift more than 1 lb with the left upper extremity  · PT/OT pendulum exercises, active assisted range of motion, below shoulder height left upper extremity  PT OT recommends rehab placement, but since patient is on Suboxone, unable to find place around this area-case management on board,      Closed fracture of multiple ribs of left side with routine healing  Assessment & Plan  CT chest and pelvis shows There are fractures of the left 6, 7th, 8th, and 9th ribs with reactive bone formation present suggesting that these fractures are subacute or less likely chronic  Continue pain control  These do not appear to be acute fractures and hence continue supportive care  PT OT recommends rehab placement, but since patient is on Suboxone, unable to find place around this area-case management on board,    Back pain  Assessment & Plan  Patient is complaining of chronic back pain  Continue Suboxone as per home regimen starting tomorrow  Patient is seen by addiction clinic [facility]  Note reviewed    He is supposed to be on 08/02 mg 1 film sublingual t i d  Other hyperlipidemia  Assessment & Plan  Continue statin therapy    Bipolar affective disorder [facility])  Assessment & Plan  Continue home regimen of Valium, Cymbalta and [address]  Psychiatry consult appreciated      VTE Pharmacologic Prophylaxis: VTE Score: 2 Low Risk (Score 0-2) - Encourage Ambulation  Patient Centered Rounds: I performed bedside rounds with nursing staff today  Discussions with Specialists or Other Care Team Provider:  None    Education and Discussions with Family / Patient: with patient  Time Spent for Care: 15 minutes  More than 50% of total time spent on counseling and coordination of care as described above  Current Length of Stay: 5 day(s)  Current Patient Status: Inpatient   Certification Statement: The patient will continue to require additional inpatient hospital stay due to To monitor above condition  Discharge Plan: To be determined    Code Status: Level 1 - Full Code    Subjective:   Seen and evaluated during the  Resting comfortably  No significant overnight change  Objective:     Vitals:   Temp (24hrs), Av 4 °F (36 9 °C), Min:97 9 °F (36 6 °C), Max:98 8 °F (37 1 °C)    Temp:  [97 9 °F (36 6 °C)-98 8 °F (37 1 °C)] 98 8 °F (37 1 °C)  HR:  [78-85] 83  Resp:  [15-20] 20  BP: (127-131)/(77-78) 131/77  SpO2:  [94 %-98 %] 95 %  Body mass index is 26 01 kg/m²  Input and Output Summary (last 24 hours): Intake/Output Summary (Last 24 hours) at 2022 1539  Last data filed at 2022 1427  Gross per 24 hour   Intake 2181 67 ml   Output 3750 ml   Net -1568 33 ml       Physical Exam:   Physical Exam  Vitals and nursing note reviewed  Constitutional:       Appearance: Normal appearance  He is ill-appearing  He is not toxic-appearing or diaphoretic  HENT:      Head: Normocephalic and atraumatic  Nose: Nose normal  No congestion  Mouth/Throat:      Mouth: Mucous membranes are moist       Pharynx: Oropharynx is clear   No oropharyngeal exudate or posterior oropharyngeal erythema  Eyes:      General: No scleral icterus  Extraocular Movements: Extraocular movements intact  Conjunctiva/sclera: Conjunctivae normal       Pupils: Pupils are equal, round, and reactive to light  Neck:      Vascular: No carotid bruit  Cardiovascular:      Rate and Rhythm: Normal rate  Pulses: Normal pulses  Heart sounds: No murmur heard  No friction rub  No gallop  Pulmonary:      Effort: Pulmonary effort is normal  No respiratory distress  Breath sounds: No stridor  No wheezing or rhonchi  Abdominal:      General: Abdomen is flat  Bowel sounds are normal  There is no distension  Palpations: There is no mass  Tenderness: There is no abdominal tenderness  Hernia: No hernia is present  Musculoskeletal:         General: No tenderness or signs of injury  Normal range of motion  Cervical back: Normal range of motion and neck supple  No tenderness  Right lower leg: No edema  Left lower leg: No edema  Lymphadenopathy:      Cervical: No cervical adenopathy  Skin:     General: Skin is warm  Capillary Refill: Capillary refill takes less than 2 seconds  Coloration: Skin is not jaundiced or pale  Findings: No bruising or erythema  Neurological:      General: No focal deficit present  Mental Status: He is alert and oriented to person, place, and time  Cranial Nerves: No cranial nerve deficit  Sensory: No sensory deficit  Motor: No weakness        Coordination: Coordination normal    Psychiatric:         Mood and Affect: Mood normal          Additional Data:     Labs:  Results from last 7 days   Lab Units 04/28/22  1537   WBC Thousand/uL 4 37   HEMOGLOBIN g/dL 9 2*   HEMATOCRIT % 27 5*   PLATELETS Thousands/uL 364   NEUTROS PCT % 61   LYMPHS PCT % 21   MONOS PCT % 7   EOS PCT % 10*     Results from last 7 days   Lab Units 04/26/22  0430   SODIUM mmol/L 139   POTASSIUM mmol/L 3 5   CHLORIDE mmol/L 105   CO2 mmol/L 26   BUN mg/dL 11   CREATININE mg/dL 0 67   ANION GAP mmol/L 8   CALCIUM mg/dL 8 1*   ALBUMIN g/dL 2 9*   TOTAL BILIRUBIN mg/dL 0 48   ALK PHOS U/L 96   ALT U/L 21   AST U/L 17   GLUCOSE RANDOM mg/dL 100     Results from last 7 days   Lab Units 04/25/22  1047   INR  0 98     Results from last 7 days   Lab Units 04/25/22  1044   POC GLUCOSE mg/dl 129     Results from last 7 days   Lab Units 04/26/22  0430   HEMOGLOBIN A1C % 5 0     Results from last 7 days   Lab Units 04/25/22  1046   LACTIC ACID mmol/L 0 7       Lines/Drains:  Invasive Devices  Report    Peripheral Intravenous Line            Peripheral IV 04/27/22 Right;Ventral (anterior) Forearm 2 days                      Imaging: No pertinent imaging reviewed  Recent Cultures (last 7 days):   Results from last 7 days   Lab Units 04/27/22  1212 04/25/22  1121 04/25/22  1053   BLOOD CULTURE   --  No Growth After 4 Days  No Growth After 4 Days     GRAM STAIN RESULT  No polys seen*  Rare Gram positive cocci in pairs*  Rare Gram positive rods*  --   --    WOUND CULTURE  3+ Growth of Escherichia coli*  3+ Growth of   --   --        Last 24 Hours Medication List:   Current Facility-Administered Medications   Medication Dose Route Frequency Provider Last Rate    acetaminophen  650 mg Oral Q6H PRN Ramirez Ochoa MD      amoxicillin-clavulanate  1 tablet Oral Q12H [address] Anoop Hernandez MD      aspirin  81 mg Oral Daily Ramirez Ochoa MD      atorvastatin  40 mg Oral QPM Ramirez Ochoa MD      buprenorphine-naloxone  8 mg Sublingual TID Ramirez Ochoa MD      calcium carbonate  1,000 mg Oral Daily PRN Ramirez Ochoa MD      diazepam  5 mg Oral Q8H PRN Ramirez Ochoa MD      DULoxetine  120 mg Oral Daily Ramirez Ochoa MD      heparin (porcine)  5,000 Units Subcutaneous [facility] Anoop Hernandez MD      ibuprofen  600 mg Oral Q6H PRN Anoop Hernandez MD      lidocaine  1 patch Topical Daily Anoop Hernandez MD      lurasidone  20 mg Oral HS Nathan King MD      nicotine  1 patch Transdermal Daily Nathan King MD      ondansetron  4 mg Intravenous Q6H PRN Nathan King MD      saccharomyces boulardii  250 mg Oral BID Damaso Pan MD      sodium chloride  100 mL/hr Intravenous Continuous Nathan King  mL/hr (04/30/22 1002)    tamsulosin  0 4 mg Oral Daily With Meg Singleton MD      traZODone  150 mg Oral HS Nathan King MD          Today, Patient Was Seen By: Damaso Pan MD    **Please Note: This note may have been constructed using a voice recognition system  **

## 2022-04-30 NOTE — ASSESSMENT & PLAN NOTE
Unknown etiology  Patient does have history of left humeral fracture-and left upper extremity on triangular sling for immobilization  venous duplex of left upper extremity negative  Compression bandage

## 2022-05-01 LAB
BACTERIA WND AEROBE CULT: ABNORMAL
GRAM STN SPEC: ABNORMAL

## 2022-05-01 PROCEDURE — 93971 EXTREMITY STUDY: CPT | Performed by: SURGERY

## 2022-05-01 PROCEDURE — 99232 SBSQ HOSP IP/OBS MODERATE 35: CPT | Performed by: FAMILY MEDICINE

## 2022-05-01 RX ADMIN — TRAZODONE HYDROCHLORIDE 150 MG: 100 TABLET ORAL at 20:24

## 2022-05-01 RX ADMIN — ATORVASTATIN CALCIUM 40 MG: 40 TABLET, FILM COATED ORAL at 17:13

## 2022-05-01 RX ADMIN — AMOXICILLIN AND CLAVULANATE POTASSIUM 1 TABLET: 875; 125 TABLET, FILM COATED ORAL at 20:24

## 2022-05-01 RX ADMIN — HEPARIN SODIUM 5000 UNITS: 5000 INJECTION INTRAVENOUS; SUBCUTANEOUS at 05:45

## 2022-05-01 RX ADMIN — DIAZEPAM 5 MG: 5 TABLET ORAL at 11:59

## 2022-05-01 RX ADMIN — DULOXETINE HYDROCHLORIDE 120 MG: 60 CAPSULE, DELAYED RELEASE ORAL at 09:15

## 2022-05-01 RX ADMIN — AMOXICILLIN AND CLAVULANATE POTASSIUM 1 TABLET: 875; 125 TABLET, FILM COATED ORAL at 09:15

## 2022-05-01 RX ADMIN — TAMSULOSIN HYDROCHLORIDE 0.4 MG: 0.4 CAPSULE ORAL at 17:12

## 2022-05-01 RX ADMIN — HEPARIN SODIUM 5000 UNITS: 5000 INJECTION INTRAVENOUS; SUBCUTANEOUS at 20:25

## 2022-05-01 RX ADMIN — DIAZEPAM 5 MG: 5 TABLET ORAL at 20:24

## 2022-05-01 RX ADMIN — Medication 250 MG: at 09:15

## 2022-05-01 RX ADMIN — ASPIRIN 81 MG CHEWABLE TABLET 81 MG: 81 TABLET CHEWABLE at 09:15

## 2022-05-01 RX ADMIN — Medication 250 MG: at 17:13

## 2022-05-01 RX ADMIN — BUPRENORPHINE AND NALOXONE 8 MG: 8; 2 FILM BUCCAL; SUBLINGUAL at 17:13

## 2022-05-01 RX ADMIN — LURASIDONE HYDROCHLORIDE 20 MG: 20 TABLET, FILM COATED ORAL at 20:25

## 2022-05-01 RX ADMIN — SODIUM CHLORIDE 100 ML/HR: 0.9 INJECTION, SOLUTION INTRAVENOUS at 04:12

## 2022-05-01 RX ADMIN — BUPRENORPHINE AND NALOXONE 8 MG: 8; 2 FILM BUCCAL; SUBLINGUAL at 09:15

## 2022-05-01 RX ADMIN — SODIUM CHLORIDE 100 ML/HR: 0.9 INJECTION, SOLUTION INTRAVENOUS at 13:47

## 2022-05-01 RX ADMIN — BUPRENORPHINE AND NALOXONE 8 MG: 8; 2 FILM BUCCAL; SUBLINGUAL at 20:25

## 2022-05-01 RX ADMIN — HEPARIN SODIUM 5000 UNITS: 5000 INJECTION INTRAVENOUS; SUBCUTANEOUS at 13:44

## 2022-05-01 NOTE — ASSESSMENT & PLAN NOTE
Incidental finding  TSH normal, does not have any visual change  No electrolyte imbalance    No signs of symptoms of acromegaly,  Patient will need outpatient follow-up with Neurology and Ophthalmology-as per Neurology recommendation

## 2022-05-01 NOTE — PROGRESS NOTES
[address]  Progress Note - [facility] 1960, 63 y o  male MRN: [id number]  Unit/Bed#: MS [facility] Encounter: [id number]  Primary Care Provider: Will Montelongo MD   Date and time admitted to hospital: 4/25/2022 10:18 AM    * Toxic encephalopathy  Assessment & Plan  Most likely secondary to Suboxone and Valium  MRI of the brain also shows empty sella  Patient noted to have worsening confusion, yelling for help,  Found on the floor by neighbors  Found to be incontinent of stool  Currently in the ED he is having episodes of somnolence however is arousable for few minutes and then he drifts back to sleep  Urine drug screen is positive for benzos  Patient is chronically on Valium 10 mg 3 times daily and also on Suboxone 8/2 mg 1 sublingual film 3 times daily  Unclear whether he was abusing medications are withdrawing from medications  CT brain and CTa head and neck reviewed  No acute pathology noted  Resume home medications    Neurology consult appreciated  PT OT recommends to discharge patient on rehab-since patient is on Suboxone, which can affect the discharge planning and placement  Case management on board      Swelling of left upper extremity  Assessment & Plan  Unknown etiology  Patient does have history of left humeral fracture-and left upper extremity on triangular sling for immobilization  venous duplex of left upper extremity negative  Compression bandage  Empty sella turcica ([address] )  Assessment & Plan  Incidental finding  TSH normal, does not have any visual change  No electrolyte imbalance    No signs of symptoms of acromegaly,  Patient will need outpatient follow-up with Neurology and Ophthalmology-as per Neurology recommendation    Sacral decubitus ulcer, stage III ([address] )  Assessment & Plan  Continue wound care  Follow wound culture  Wound culture shows E coli, Gram-positive rods, Gram-positive cocci-sensitive to Augmentin, started Augmentin for 7 days     Ecchymosis  Assessment & Plan  Affecting left flank, left lateral chest, left upper thigh-remained stable  Resume heparin    Opioid dependence (HCC)  Assessment & Plan  chronic back pain, evidenced by daily use of Suboxone 8mg three times a day, requiring continued regime in hospital    PT OT recommends rehab placement, but since patient is on Suboxone, unable to find place around this area-case management on board,      Left humeral fracture  Assessment & Plan  Patient had left humeral fracture diagnosed on 04/04/2022 on an ED visit  Still present on imaging  · Orthopedic recommendation appreciated:Patient may not lift more than 1 lb with the left upper extremity  · PT/OT pendulum exercises, active assisted range of motion, below shoulder height left upper extremity  PT OT recommends rehab placement, but since patient is on Suboxone, unable to find place around this area-case management on board,      Closed fracture of multiple ribs of left side with routine healing  Assessment & Plan  CT chest and pelvis shows There are fractures of the left 6, 7th, 8th, and 9th ribs with reactive bone formation present suggesting that these fractures are subacute or less likely chronic  Continue pain control  These do not appear to be acute fractures and hence continue supportive care  PT OT recommends rehab placement, but since patient is on Suboxone, unable to find place around this area-case management on board,    Back pain  Assessment & Plan  Patient is complaining of chronic back pain  Continue Suboxone as per home regimen starting tomorrow  Patient is seen by addiction clinic [facility]  Note reviewed    He is supposed to be on 08/02 mg 1 film sublingual t i d     Other hyperlipidemia  Assessment & Plan  Continue statin therapy    Bipolar affective disorder [facility])  Assessment & Plan  Continue home regimen of Valium, Cymbalta and [address]  Psychiatry consult appreciated          VTE Pharmacologic Prophylaxis: VTE Score: 2 Moderate Risk (Score 3-4) - Pharmacological DVT Prophylaxis Ordered: heparin  Patient Centered Rounds: I performed bedside rounds with nursing staff today  Discussions with Specialists or Other Care Team Provider:  None    Education and Discussions with Family / Patient: Updated  (son) via phone -left voice message    Time Spent for Care: 15 minutes  More than 50% of total time spent on counseling and coordination of care as described above  Current Length of Stay: 6 day(s)  Current Patient Status: Inpatient   Certification Statement: The patient will continue to require additional inpatient hospital stay due to To monitor above 1  Discharge Plan: To be determined    Code Status: Level 1 - Full Code    Subjective:   Seen and evaluated during the round  Resting comfortably  No significant overnight issues  Objective:     Vitals:   Temp (24hrs), Av 2 °F (36 8 °C), Min:98 1 °F (36 7 °C), Max:98 5 °F (36 9 °C)    Temp:  [98 1 °F (36 7 °C)-98 5 °F (36 9 °C)] 98 5 °F (36 9 °C)  HR:  [73-82] 82  Resp:  [14-16] 14  BP: (117-129)/(73-76) 117/73  SpO2:  [95 %-96 %] 95 %  Body mass index is 26 01 kg/m²  Input and Output Summary (last 24 hours): Intake/Output Summary (Last 24 hours) at 2022 1555  Last data filed at 2022 1225  Gross per 24 hour   Intake 1080 ml   Output 2450 ml   Net -1370 ml       Physical Exam:   Physical Exam  Vitals and nursing note reviewed  Constitutional:       Appearance: Normal appearance  He is not ill-appearing, toxic-appearing or diaphoretic  HENT:      Head: Normocephalic and atraumatic  Nose: Nose normal  No congestion  Mouth/Throat:      Mouth: Mucous membranes are moist       Pharynx: Oropharynx is clear  No oropharyngeal exudate or posterior oropharyngeal erythema  Eyes:      General: No scleral icterus  Conjunctiva/sclera: Conjunctivae normal       Pupils: Pupils are equal, round, and reactive to light  Neck:      Vascular: No carotid bruit  Cardiovascular:      Rate and Rhythm: Normal rate  Heart sounds: No murmur heard  No friction rub  No gallop  Pulmonary:      Effort: Pulmonary effort is normal  No respiratory distress  Breath sounds: No stridor  No wheezing or rhonchi  Abdominal:      General: Abdomen is flat  Bowel sounds are normal  There is no distension  Palpations: There is no mass  Tenderness: There is no abdominal tenderness  Hernia: No hernia is present  Musculoskeletal:         General: Tenderness present  No signs of injury  Cervical back: Normal range of motion  Right lower leg: No edema  Left lower leg: No edema  Comments: Limited range of motion left arm  Swelling and tenderness of left upper extremity   Lymphadenopathy:      Cervical: No cervical adenopathy  Skin:     General: Skin is warm and dry  Capillary Refill: Capillary refill takes less than 2 seconds  Findings: No lesion or rash  Neurological:      General: No focal deficit present  Mental Status: He is alert and oriented to person, place, and time  Mental status is at baseline  Cranial Nerves: No cranial nerve deficit  Sensory: No sensory deficit  Motor: No weakness        Coordination: Coordination normal          Additional Data:     Labs:  Results from last 7 days   Lab Units 04/28/22  1537   WBC Thousand/uL 4 37   HEMOGLOBIN g/dL 9 2*   HEMATOCRIT % 27 5*   PLATELETS Thousands/uL 364   NEUTROS PCT % 61   LYMPHS PCT % 21   MONOS PCT % 7   EOS PCT % 10*     Results from last 7 days   Lab Units 04/26/22  0430   SODIUM mmol/L 139   POTASSIUM mmol/L 3 5   CHLORIDE mmol/L 105   CO2 mmol/L 26   BUN mg/dL 11   CREATININE mg/dL 0 67   ANION GAP mmol/L 8   CALCIUM mg/dL 8 1*   ALBUMIN g/dL 2 9*   TOTAL BILIRUBIN mg/dL 0 48   ALK PHOS U/L 96   ALT U/L 21   AST U/L 17   GLUCOSE RANDOM mg/dL 100     Results from last 7 days   Lab Units 04/25/22  1047   INR 0 98     Results from last 7 days   Lab Units 04/25/22  1044   POC GLUCOSE mg/dl 129     Results from last 7 days   Lab Units 04/26/22  0430   HEMOGLOBIN A1C % 5 0     Results from last 7 days   Lab Units 04/25/22  1046   LACTIC ACID mmol/L 0 7       Lines/Drains:  Invasive Devices  Report    Peripheral Intravenous Line            Peripheral IV 04/27/22 Right;Ventral (anterior) Forearm 3 days    Peripheral IV 05/01/22 Dorsal (posterior); Right Hand <1 day                      Imaging: No pertinent imaging reviewed  Recent Cultures (last 7 days):   Results from last 7 days   Lab Units 04/27/22  1212 04/25/22  1121 04/25/22  1053   BLOOD CULTURE   --  No Growth After 5 Days  No Growth After 5 Days     GRAM STAIN RESULT  No polys seen*  Rare Gram positive cocci in pairs*  Rare Gram positive rods*  --   --    WOUND CULTURE  3+ Growth of Escherichia coli*  3+ Growth of   3+ Growth of Bacillus species NOT anthracis*  --   --        Last 24 Hours Medication List:   Current Facility-Administered Medications   Medication Dose Route Frequency Provider Last Rate    acetaminophen  650 mg Oral Q6H PRN Veronique Cotto MD      amoxicillin-clavulanate  1 tablet Oral Q12H [address] Tal Noel MD      aspirin  81 mg Oral Daily Veronique Cotto MD      atorvastatin  40 mg Oral QPM Veronique Cotto MD      buprenorphine-naloxone  8 mg Sublingual TID Veronique Cotto MD      calcium carbonate  1,000 mg Oral Daily PRN Veronique Cotto MD      diazepam  5 mg Oral Q8H PRN Veronique Cotto MD      DULoxetine  120 mg Oral Daily Veronique Cotto MD      heparin (porcine)  5,000 Units Subcutaneous [facility] Tal Noel MD      ibuprofen  600 mg Oral Q6H PRN Tal Noel MD      lidocaine  1 patch Topical Daily Tal Noel MD      lurasidone  20 mg Oral HS Veronique Cotto MD      nicotine  1 patch Transdermal Daily Veronique Cotto MD      ondansetron  4 mg Intravenous Q6H PRN Veronique Cotto MD      saccharomyces boulardii  250 mg Oral BID Kevin Grajeda MD      sodium chloride  100 mL/hr Intravenous Continuous Sonia Greene  mL/hr (05/01/22 4567)    tamsulosin  0 4 mg Oral Daily With Amanda Contreras MD      traZODone  150 mg Oral HS Sonia Greene MD          Today, Patient Was Seen By: Kevin Grajeda MD    **Please Note: This note may have been constructed using a voice recognition system  **

## 2022-05-01 NOTE — PLAN OF CARE
Problem: MOBILITY - ADULT  Goal: Maintain or return to baseline ADL function  Description: INTERVENTIONS:  -  Assess patient's ability to carry out ADLs; assess patient's baseline for ADL function and identify physical deficits which impact ability to perform ADLs (bathing, care of mouth/teeth, toileting, grooming, dressing, etc )  - Assess/evaluate cause of self-care deficits   - Assess range of motion  - Assess patient's mobility; develop plan if impaired  - Assess patient's need for assistive devices and provide as appropriate  - Encourage maximum independence but intervene and supervise when necessary  - Involve family in performance of ADLs  - Assess for home care needs following discharge   - Consider OT consult to assist with ADL evaluation and planning for discharge  - Provide patient education as appropriate  Outcome: Progressing  Goal: Maintains/Returns to pre admission functional level  Description: INTERVENTIONS:  - Perform BMAT or MOVE assessment daily    - Set and communicate daily mobility goal to care team and patient/family/caregiver     - Collaborate with rehabilitation services on mobility goals if consulted  - Out of bed for meals 3 times a day  - Out of bed for toileting  - Record patient progress and toleration of activity level   Outcome: Progressing     Problem: PAIN - ADULT  Goal: Verbalizes/displays adequate comfort level or baseline comfort level  Description: Interventions:  - Encourage patient to monitor pain and request assistance  - Assess pain using appropriate pain scale  - Administer analgesics based on type and severity of pain and evaluate response  - Implement non-pharmacological measures as appropriate and evaluate response  - Consider cultural and social influences on pain and pain management  - Notify physician/advanced practitioner if interventions unsuccessful or patient reports new pain  Outcome: Progressing     Problem: INFECTION - ADULT  Goal: Absence or prevention of progression during hospitalization  Description: INTERVENTIONS:  - Assess and monitor for signs and symptoms of infection  - Monitor lab/diagnostic results  - Monitor all insertion sites, i e  indwelling lines, tubes, and drains  - Monitor endotracheal if appropriate and nasal secretions for changes in amount and color  - [place] appropriate cooling/warming therapies per order  - Administer medications as ordered  - Instruct and encourage patient and family to use good hand hygiene technique  - Identify and instruct in appropriate isolation precautions for identified infection/condition  Outcome: Progressing     Problem: DISCHARGE PLANNING  Goal: Discharge to home or other facility with appropriate resources  Description: INTERVENTIONS:  - Identify barriers to discharge w/patient and caregiver  - Arrange for needed discharge resources and transportation as appropriate  - Identify discharge learning needs (meds, wound care, etc )  - Arrange for interpretive services to assist at discharge as needed  - Refer to Case Management Department for coordinating discharge planning if the patient needs post-hospital services based on physician/advanced practitioner order or complex needs related to functional status, cognitive ability, or social support system  Outcome: Progressing     Problem: Knowledge Deficit  Goal: Patient/family/caregiver demonstrates understanding of disease process, treatment plan, medications, and discharge instructions  Description: Complete learning assessment and assess knowledge base    Interventions:  - Provide teaching at level of understanding  - Provide teaching via preferred learning methods  Outcome: Progressing     Problem: NEUROSENSORY - ADULT  Goal: Achieves stable or improved neurological status  Description: INTERVENTIONS  - Monitor and report changes in neurological status  - Monitor vital signs such as temperature, blood pressure, glucose, and any other labs ordered   - Initiate measures to prevent increased intracranial pressure  - Monitor for seizure activity and implement precautions if appropriate      Outcome: Progressing  Goal: Remains free of injury related to seizures activity  Description: INTERVENTIONS  - Maintain airway, patient safety  and administer oxygen as ordered  - Monitor patient for seizure activity, document and report duration and description of seizure to physician/advanced practitioner  - If seizure occurs,  ensure patient safety during seizure  - Reorient patient post seizure  - Seizure pads on all 4 side rails  - Instruct patient/family to notify RN of any seizure activity including if an aura is experienced  - Instruct patient/family to call for assistance with activity based on nursing assessment  - Administer anti-seizure medications if ordered    Outcome: Progressing  Goal: Achieves maximal functionality and self care  Description: INTERVENTIONS  - Monitor swallowing and airway patency with patient fatigue and changes in neurological status  - Encourage and assist patient to increase activity and self care     - Encourage visually impaired, hearing impaired and aphasic patients to use assistive/communication devices  Outcome: Progressing     Problem: MUSCULOSKELETAL - ADULT  Goal: Maintain or return mobility to safest level of function  Description: INTERVENTIONS:  - Assess patient's ability to carry out ADLs; assess patient's baseline for ADL function and identify physical deficits which impact ability to perform ADLs (bathing, care of mouth/teeth, toileting, grooming, dressing, etc )  - Assess/evaluate cause of self-care deficits   - Assess range of motion  - Assess patient's mobility  - Assess patient's need for assistive devices and provide as appropriate  - Encourage maximum independence but intervene and supervise when necessary  - Involve family in performance of ADLs  - Assess for home care needs following discharge   - Consider OT consult to assist with ADL evaluation and planning for discharge  - Provide patient education as appropriate  Outcome: Progressing  Goal: Maintain proper alignment of affected body part  Description: INTERVENTIONS:  - Support, maintain and protect limb and body alignment  - Provide patient/ family with appropriate education  Outcome: Progressing     Problem: Neurological Deficit  Goal: Neurological status is stable or improving  Description: Interventions:  - Monitor and assess patient's level of consciousness, motor function, sensory function, and level of assistance needed for ADLs  - Monitor and report changes from baseline  Collaborate with interdisciplinary team to initiate plan and implement interventions as ordered  - Provide and maintain a safe environment  - Consider seizure precautions  - Consider fall precautions  - Consider aspiration precautions  - Consider bleeding precautions  Outcome: Progressing     Problem: Activity Intolerance/Impaired Mobility  Goal: Mobility/activity is maintained at optimum level for patient  Description: Interventions:  - Assess and monitor patient  barriers to mobility and need for assistive/adaptive devices  - Assess patient's emotional response to limitations  - Collaborate with interdisciplinary team and initiate plans and interventions as ordered  - Encourage independent activity per ability   - Maintain proper body alignment  - Perform active/passive rom as tolerated/ordered  - Plan activities to conserve energy   - Turn patient as appropriate  Outcome: Progressing     Problem: Communication Impairment  Goal: Ability to express needs and understand communication  Description: Assess patient's communication skills and ability to understand information  Patient will demonstrate use of effective communication techniques, alternative methods of communication and understanding even if not able to speak       - Encourage communication and provide alternate methods of communication as needed  - Collaborate with case management/ for discharge needs  - Include patient/family/caregiver in decisions related to communication  Outcome: Progressing     Problem: Potential for Aspiration  Goal: Non-ventilated patient's risk of aspiration is minimized  Description: Assess and monitor vital signs, respiratory status, and labs (WBC)  Monitor for signs of aspiration (tachypnea, cough, rales, wheezing, cyanosis, fever)  - Assess and monitor patient's ability to swallow  - Place patient up in chair to eat if possible  - HOB up at 90 degrees to eat if unable to get patient up into chair   - Supervise patient during oral intake  - Instruct patient/ family to take small bites  - Instruct patient/ family to take small single sips when taking liquids  - Follow patient-specific strategies generated by speech pathologist   Outcome: Progressing     Problem: Nutrition  Goal: Nutrition/Hydration status is improving  Description: Monitor and assess patient's nutrition/hydration status for malnutrition (ex- brittle hair, bruises, dry skin, pale skin and conjunctiva, muscle wasting, smooth red tongue, and disorientation)  Collaborate with interdisciplinary team and initiate plan and interventions as ordered  Monitor patient's weight and dietary intake as ordered or per policy  Utilize nutrition screening tool and intervene per policy  Determine patient's food preferences and provide high-protein, high-caloric foods as appropriate  - Assist patient with eating   - Allow adequate time for meals   - Encourage patient to take dietary supplement as ordered  - Collaborate with clinical nutritionist   - Include patient/family/caregiver in decisions related to nutrition    Outcome: Progressing     Problem: Prexisting or High Potential for Compromised Skin Integrity  Goal: Skin integrity is maintained or improved  Description: INTERVENTIONS:  - Identify patients at risk for skin breakdown  - Assess and monitor skin integrity  - Assess and monitor nutrition and hydration status  - Monitor labs   - Assess for incontinence   - Turn and reposition patient  - Assist with mobility/ambulation  - Relieve pressure over bony prominences  - Avoid friction and shearing  - Provide appropriate hygiene as needed including keeping skin clean and dry  - Evaluate need for skin moisturizer/barrier cream  - Collaborate with interdisciplinary team   - Patient/family teaching  - Consider wound care consult   Outcome: Progressing     Problem: Potential for Falls  Goal: Patient will remain free of falls  Description: INTERVENTIONS:  - Educate patient/family on patient safety including physical limitations  - Instruct patient to call for assistance with activity   - Consult OT/PT to assist with strengthening/mobility   - Keep Call bell within reach  - Keep bed low and locked with side rails adjusted as appropriate  - Keep care items and personal belongings within reach  - Initiate and maintain comfort rounds  - Make Fall Risk Sign visible to staff  - Offer Toileting every 2 Hours, in advance of need  - Initiate/Maintain bed alarm  - Obtain necessary fall risk management equipment:   - Apply yellow socks and bracelet for high fall risk patients  - Consider moving patient to room near nurses station  Outcome: Progressing     Problem: Nutrition/Hydration-ADULT  Goal: Nutrient/Hydration intake appropriate for improving, restoring or maintaining nutritional needs  Description: Monitor and assess patient's nutrition/hydration status for malnutrition  Collaborate with interdisciplinary team and initiate plan and interventions as ordered  Monitor patient's weight and dietary intake as ordered or per policy  Utilize nutrition screening tool and intervene as necessary  Determine patient's food preferences and provide high-protein, high-caloric foods as appropriate       INTERVENTIONS:  - Monitor oral intake, urinary output, labs, and treatment plans  - Assess nutrition and hydration status and recommend course of action  - Evaluate amount of meals eaten  - Assist patient with eating if necessary   - Allow adequate time for meals  - Recommend/ encourage appropriate diets, oral nutritional supplements, and vitamin/mineral supplements  - Order, calculate, and assess calorie counts as needed  - Recommend, monitor, and adjust tube feedings and TPN/PPN based on assessed needs  - Assess need for intravenous fluids  - Provide specific nutrition/hydration education as appropriate  - Include patient/family/caregiver in decisions related to nutrition  Outcome: Progressing     Problem: SKIN/TISSUE INTEGRITY - ADULT  Goal: Incision(s), wounds(s) or drain site(s) healing without S/S of infection  Description: INTERVENTIONS  - Assess and document dressing, incision, wound bed, drain sites and surrounding tissue  - Provide patient and family education  - Perform skin care/dressing changes   Outcome: Progressing  Goal: Pressure injury heals and does not worsen  Description: Interventions:  - Implement low air loss mattress or specialty surface (Criteria met)  - Apply silicone foam dressing  - Instruct/assist with weight shifting every 60 minutes when in chair   - Limit chair time to 2 hour intervals  - Use special pressure reducing interventions such as  when in chair   - Apply fecal or urinary incontinence containment device   - Perform passive or active ROM every 2 hrs  - Turn and reposition patient & offload bony prominences every 2 hours   - Utilize friction reducing device or surface for transfers   - Consider consults to  interdisciplinary teams such as pt ot  - Use incontinent care products after each incontinent episode  - Consider nutrition services referral as needed  Outcome: Progressing

## 2022-05-01 NOTE — ASSESSMENT & PLAN NOTE
Patient is complaining of chronic back pain  Continue Suboxone as per home regimen starting tomorrow  Patient is seen by addiction clinic [facility]  Note reviewed    He is supposed to be on 08/02 mg 1 film sublingual t i d

## 2022-05-02 PROBLEM — R58 ECCHYMOSIS: Status: RESOLVED | Noted: 2022-04-27 | Resolved: 2022-05-02

## 2022-05-02 PROCEDURE — 97110 THERAPEUTIC EXERCISES: CPT

## 2022-05-02 PROCEDURE — 97530 THERAPEUTIC ACTIVITIES: CPT

## 2022-05-02 PROCEDURE — 97116 GAIT TRAINING THERAPY: CPT

## 2022-05-02 PROCEDURE — 92610 EVALUATE SWALLOWING FUNCTION: CPT

## 2022-05-02 PROCEDURE — 99233 SBSQ HOSP IP/OBS HIGH 50: CPT | Performed by: INTERNAL MEDICINE

## 2022-05-02 RX ADMIN — ASPIRIN 81 MG CHEWABLE TABLET 81 MG: 81 TABLET CHEWABLE at 09:17

## 2022-05-02 RX ADMIN — HEPARIN SODIUM 5000 UNITS: 5000 INJECTION INTRAVENOUS; SUBCUTANEOUS at 13:33

## 2022-05-02 RX ADMIN — IBUPROFEN 600 MG: 600 TABLET ORAL at 08:34

## 2022-05-02 RX ADMIN — HEPARIN SODIUM 5000 UNITS: 5000 INJECTION INTRAVENOUS; SUBCUTANEOUS at 20:41

## 2022-05-02 RX ADMIN — SODIUM CHLORIDE 100 ML/HR: 0.9 INJECTION, SOLUTION INTRAVENOUS at 08:34

## 2022-05-02 RX ADMIN — TRAZODONE HYDROCHLORIDE 150 MG: 100 TABLET ORAL at 20:40

## 2022-05-02 RX ADMIN — ATORVASTATIN CALCIUM 40 MG: 40 TABLET, FILM COATED ORAL at 17:06

## 2022-05-02 RX ADMIN — LIDOCAINE 5% 1 PATCH: 700 PATCH TOPICAL at 09:17

## 2022-05-02 RX ADMIN — BUPRENORPHINE AND NALOXONE 8 MG: 8; 2 FILM BUCCAL; SUBLINGUAL at 09:17

## 2022-05-02 RX ADMIN — AMOXICILLIN AND CLAVULANATE POTASSIUM 1 TABLET: 875; 125 TABLET, FILM COATED ORAL at 20:40

## 2022-05-02 RX ADMIN — DIAZEPAM 5 MG: 5 TABLET ORAL at 20:40

## 2022-05-02 RX ADMIN — BUPRENORPHINE AND NALOXONE 8 MG: 8; 2 FILM BUCCAL; SUBLINGUAL at 20:40

## 2022-05-02 RX ADMIN — TAMSULOSIN HYDROCHLORIDE 0.4 MG: 0.4 CAPSULE ORAL at 17:06

## 2022-05-02 RX ADMIN — Medication 250 MG: at 09:17

## 2022-05-02 RX ADMIN — LURASIDONE HYDROCHLORIDE 20 MG: 20 TABLET, FILM COATED ORAL at 20:40

## 2022-05-02 RX ADMIN — HEPARIN SODIUM 5000 UNITS: 5000 INJECTION INTRAVENOUS; SUBCUTANEOUS at 04:43

## 2022-05-02 RX ADMIN — NICOTINE 7 MG/24 HR DAILY TRANSDERMAL PATCH 1 PATCH: at 09:17

## 2022-05-02 RX ADMIN — BUPRENORPHINE AND NALOXONE 8 MG: 8; 2 FILM BUCCAL; SUBLINGUAL at 17:06

## 2022-05-02 RX ADMIN — Medication 250 MG: at 17:06

## 2022-05-02 RX ADMIN — AMOXICILLIN AND CLAVULANATE POTASSIUM 1 TABLET: 875; 125 TABLET, FILM COATED ORAL at 09:17

## 2022-05-02 RX ADMIN — DULOXETINE HYDROCHLORIDE 120 MG: 60 CAPSULE, DELAYED RELEASE ORAL at 09:17

## 2022-05-02 NOTE — PHYSICAL THERAPY NOTE
PHYSICAL THERAPY TREATMENT NOTE  NAME:  Juarez Kelley  DATE: 05/02/22    Length Of Stay: 7  Performed at least 2 patient identifiers during session: Name and ID bracelet    TREATMENT FLOWSHEET:    05/02/22 1338   PT Last Visit   PT Visit Date 05/02/22   Note Type   Note Type Treatment   Pain Assessment   Pain Assessment Tool 0-10   Pain Score No Pain   Restrictions/Precautions   Weight Bearing Precautions Per Order Yes   LUE Weight Bearing Per Order NWB   Braces or Orthoses Sling   Other Precautions Chair Alarm; Bed Alarm; Fall Risk;WBS   General   Chart Reviewed Yes   Response to Previous Treatment Patient with no complaints from previous session  Family/Caregiver Present No   Cognition   Overall Cognitive Status Impaired   Arousal/Participation Alert; Cooperative   Attention Attends with cues to redirect   Orientation Level Oriented to person;Oriented to place   Memory Decreased recall of recent events;Decreased recall of biographical information   Following Commands Follows one step commands with increased time or repetition   Subjective   Subjective "I just want to get to my new appartment>"   Bed Mobility   Rolling R 6  Modified independent   Additional items HOB elevated; Bedrails; Increased time required   Rolling L 6  Modified independent   Additional items HOB elevated; Bedrails; Increased time required   Supine to Sit 5  Supervision   Additional items Assist x 1;HOB elevated; Bedrails; Increased time required;Verbal cues   Sit to Supine 5  Supervision   Additional items Assist x 1;Bedrails;HOB elevated; Increased time required;Verbal cues   Additional Comments Pt able to maintain unsupported sitting balance to perform ther ex at EOB   Transfers   Sit to Stand   (SBA)   Additional items Assist x 1; Increased time required;Verbal cues   Stand to Sit   (SBA)   Additional items Assist x 1; Increased time required;Verbal cues   Stand pivot   (SBA)   Additional items Assist x 1; Increased time required;Verbal cues   Toilet transfer   (SBA)   Additional items Assist x 1; Increased time required;Verbal cues;Standard toilet  (utilized grab bar with RUE)   Additional Comments Pt  required SBA to manage clothing and hygiene needs   Ambulation/Elevation   Gait pattern Improper Weight shift;Decreased foot clearance; Wide BRANDON; Short stride; Excessively slow   Gait Assistance   (SBA)   Additional items Assist x 1;Verbal cues   Assistive Device None   Distance 1000 ft +  (around unit 3x without rest period, community distances)   Stair Management Assistance   (SBA)   Additional items Assist x 1;Verbal cues   Stair Management Technique One rail R;Alternating pattern; Foreward   Number of Stairs 15  (both directions performed)   Ambulation/Elevation Additional Comments Occational deviation from staight pathway without LOB   Balance   Static Sitting Good   Dynamic Sitting Fair +   Static Standing Fair   Dynamic Standing Fair -   Ambulatory Fair -   Endurance Deficit   Endurance Deficit No   Activity Tolerance   Activity Tolerance Patient tolerated treatment well   Medical Staff Made Aware Physician aware   Nurse Made Aware RN aware   Exercises   Quad Sets Sitting;20 reps;AROM; Bilateral   Heelslides Sitting;20 reps;AROM; Bilateral   Glute Sets Sitting;20 reps;AROM; Bilateral   Hip Flexion Sitting;20 reps;AROM; Bilateral   Hip Abduction Sitting;20 reps;AROM; Bilateral   Hip Adduction Sitting;20 reps;AROM; Bilateral   Knee AROM Long Arc Quad Sitting;20 reps;AROM; Bilateral   Ankle Pumps Sitting;20 reps;AROM; Bilateral   Marching Sitting;20 reps;AROM; Bilateral   Assessment   Prognosis Good   Problem List Decreased strength; Impaired balance;Decreased mobility; Impaired judgement;Decreased safety awareness;Orthopedic restrictions;Decreased skin integrity   Goals   Patient Goals to get into new appartment   PT Treatment Day 2   Plan   Treatment/Interventions Functional transfer training;LE strengthening/ROM; Elevations; Therapeutic exercise; Endurance training;Patient/family training;Equipment eval/education; Bed mobility;Gait training;Spoke to nursing;Spoke to MD   Progress Progressing toward goals   PT Frequency 3-5x/wk   Recommendation   PT Discharge Recommendation Home with home health rehabilitation   [address] [place] [address] Mobility Inpatient   Turning in Bed Without Bedrails 4   Lying on Back to Sitting on Edge of Flat Bed 3   Moving Bed to Chair 3   Standing Up From Chair 4   Walk in Room 3   Climb 3-5 Stairs 3   Basic Mobility Inpatient Raw Score 20   Basic Mobility Standardized Score 43 99   Highest Level Of Mobility   JH-HLM Goal 6: Walk 10 steps or more   JH-HLM Highest Level of Mobility 8: Walk 250 feet ot more   JH-HLM Goal Achieved Yes       The patient's AM-PAC Basic Mobility Inpatient Short Form Raw Score is 20, Standardized Score is 43 99  A standardized score greater than 42 9 suggests the patient may benefit from discharge to home  Please also refer to the recommendation of the Physical Therapist for safe discharge planning  Pt seen for PT treatment session this date with interventions consisting of bed mobility tasks, transfer training, gait training, toilet transfers, seated TE, stair training, and education provided as needed for safety and direction to help improve functional mobility and activity tolerance  Pt agreeable to PT treatment session upon arrival, pt found resting in bed  At end of session, pt left in bed positioned for comfort with bed alarm activated and all needs in reach  In comparison to previous session, pt with improvements in ambulation distance without AD  Continue to recommend Home PT at time of d/c in order to maximize pt's functional independence and safety w/ mobility  Pt continues to be functioning below baseline level  PT will continue to see pt while here in order to address the deficits listed above and provide interventions consistent w/ POC in effort to achieve STGs      Claudia Peralta PTA

## 2022-05-02 NOTE — CASE MANAGEMENT
Case Management Progress Note    Patient name Steven Wilkins  Location [id number] MRN [id number]  : 1960 Date 2022       LOS (days): 7  Geometric Mean LOS (GMLOS) (days): 4 20  Days to GMLOS:-2 8        OBJECTIVE:        Current admission status: Inpatient  Preferred Pharmacy:   [facility], [place], [facility] [address] [address] [place] [address]  Phone: [phone] Fax: [phone]    Primary Care Provider: Maya Durbin MD    Primary Insurance: MEDICARE  Secondary Insurance: [address]    PROGRESS NOTE:    The following STR CAN NOT accept pt RT suboxone use:  [facility]  [address] [facility]    The following [facility] CAN NOT accept pt RT suboxone use:  [facility]- pt is too high functioning  [facility] and [facility] placed call to [facility], who sts they do accept pts on Suboxone    CM  for admissions, Kaden Riggs, requesting a return call to inquire if they can review pts chart for admission     [address] Kaden Riggs from [address] returned call, sts their facility CAN NOT accept pt on Suboxone    Additional referral placed to [address] [place] [address] [facility]    5/3/22 late entry, [facility] CAN NOT accept pts on SUboxone

## 2022-05-02 NOTE — ASSESSMENT & PLAN NOTE
Unknown etiology  Patient does have history of left humeral fracture-and left upper extremity on triangular sling for immobilization  venous duplex of left upper extremity negative  DC IV fluid

## 2022-05-02 NOTE — ASSESSMENT & PLAN NOTE
Patient is complaining of chronic back pain  Continue Suboxone as per home regimen starting tomorrow  Patient is seen by addiction clinic [facility]  Note reviewed    He is supposed to be on 08/02 mg 1 film sublingual t i d

## 2022-05-02 NOTE — SPEECH THERAPY NOTE
Speech-Language Pathology Bedside Swallow Evaluation    Patient Name: Ovidio Ward    UAJNT'D Date: 5/2/2022     Problem List  Principal Problem:    Toxic encephalopathy  Active Problems:    Bipolar affective disorder ([facility] [address] )    Other hyperlipidemia    Back pain    Closed fracture of multiple ribs of left side with routine healing    Left humeral fracture    Opioid dependence ([facility] )    Ecchymosis    Sacral decubitus ulcer, stage III ([facility] [address] )    Goals of care, counseling/discussion    Empty sella turcica (HCC)    Swelling of left upper extremity    Past Medical History  Past Medical History:   Diagnosis Date    Narcotic dependence ([facility] )      Past Surgical History  Past Surgical History:   Procedure Laterality Date    JOINT REPLACEMENT Bilateral     SPINAL FUSION         Summary  Pt presented with functional appearing oral and pharyngeal stage swallowing skills with materials administered today  Attention was poor, and frequent gentle redirection was needed to get pt to participate in assessment  No s/s aspiration noted with few trials of thin liquid, puree, and solid food  Suspect some word finding difficulties  Speech was tangential and pressed at times, often halting  Pt notably anxious, and became more so with further conversation  As a result pt would likely not benefit from a speech/language assessment/treatment at this time  Please re consult with any new changes or concerns  Risk/s for Aspiration: mild    Recommended Diet: regular diet and thin liquids   Recommended Form of Meds: whole with liquid   Aspiration precautions and swallowing strategies: upright posture  Other Recommendations: Continue frequent oral care      Current Medical Status  Ovidio Ward is a 63 y o  male who presents with confusion  Patient was found yelling by neighbors who found him on the floor with stool incontinence  Unclear exactly what happened    Patient still quite confused and somnolent and at times wakes up briefly says a few words and then goes back to sleep  Denies drug overdose  Complains of pain in his back  Unable to get an accurate history due to current change in mental status  Recently found to have left humeral fracture  Also takes Suboxone and Valium  Special Studies:  MRI brain 4/27/22 1  No evidence of acute infarct, intracranial hemorrhage or mass  2   Enlarged, empty sella, a nonspecific finding which can be seen in the setting of radiographic intracranial hypertension  CT chest 4/25/22 LUNGS:  Atelectasis in the posterior mid and lower lung zones  Subtle linear densities in the right upper lobe consistent with atelectasis/scarring  Scarring in the medial aspect of the superior segment right lower lobe  No pulmonary contusion  Social/Education/Vocational Hx:  Pt lives alone    Swallow Information   Current Risks for Dysphagia & Aspiration: AMS  Current Diet: regular diet and thin liquids   Baseline Diet: regular diet and thin liquids      Baseline Assessment   Behavior/Cognition: alert  Speech/Language Status: able to participate in basic conversation however speech was pressed and pt was notably anxious  Patient Positioning: upright in bed  Pain Status/Interventions/Response to Interventions: Pt reported pain in his back       Swallow Mechanism Exam  Facial: symmetrical  Labial: WFL  Lingual: WFL  Velum: symmetrical  Mandible: adequate ROM  Dentition: adequate  Vocal quality:clear/adequate   Volitional Cough: strong/productive   Respiratory Status: on RA       Consistencies Assessed and Performance   Consistencies Administered: thin liquids, puree and solids (kd cracker)    Oral Stage: WFL  Mastication was adequate with the materials administered today  Bolus formation and transfer were functional with no significant oral residue noted  No overt s/s reduced oral control  Pharyngeal Stage: WFL  Swallow Mechanics: Swallowing initiation appeared prompt   Laryngeal rise was palpated and judged to be within functional limits  No coughing, throat clearing, change in vocal quality or respiratory status noted today  Esophageal Concerns: none reported      Summary and Recommendations (see above)    Results Reviewed with: patient     Treatment Recommended: No additional ST f/u needed at this time  Please re consult with any new changes or concerns

## 2022-05-02 NOTE — PLAN OF CARE
Problem: PHYSICAL THERAPY ADULT  Goal: Performs mobility at highest level of function for planned discharge setting  See evaluation for individualized goals  Description: Treatment/Interventions: ADL retraining,Functional transfer training,LE strengthening/ROM,Elevations,Therapeutic exercise,Cognitive reorientation,Endurance training,Patient/family training,Equipment eval/education,Bed mobility,Gait training,Compensatory technique education,Spoke to nursing  Equipment Recommended:  (pt has cane)       See flowsheet documentation for full assessment, interventions and recommendations  Outcome: Progressing  Note: Prognosis: Good  Problem List: Decreased strength,Impaired balance,Decreased mobility,Impaired judgement,Decreased safety awareness,Orthopedic restrictions,Decreased skin integrity  Assessment: Pt seen for PT treatment session this date with interventions consisting of bed mobility tasks, transfer training, gait training, toilet transfers, seated TE, stair training, and education provided as needed for safety and direction to help improve functional mobility and activity tolerance  Pt agreeable to PT treatment session upon arrival, pt found resting in bed  At end of session, pt left in bed positioned for comfort with bed alarm activated and all needs in reach  In comparison to previous session, pt with improvements in ambulation distance without AD  Continue to recommend Home PT at time of d/c in order to maximize pt's functional independence and safety w/ mobility  Pt continues to be functioning below baseline level  PT will continue to see pt while here in order to address the deficits listed above and provide interventions consistent w/ POC in effort to achieve STGs  Barriers to Discharge: Decreased caregiver support  Barriers to Discharge Comments: lives alone   would beenfit from increased support of family, community     PT Discharge Recommendation: Home with home health rehabilitation          See flowsheet documentation for full assessment

## 2022-05-02 NOTE — PLAN OF CARE
Problem: MOBILITY - ADULT  Goal: Maintain or return to baseline ADL function  Description: INTERVENTIONS:  -  Assess patient's ability to carry out ADLs; assess patient's baseline for ADL function and identify physical deficits which impact ability to perform ADLs (bathing, care of mouth/teeth, toileting, grooming, dressing, etc )  - Assess/evaluate cause of self-care deficits   - Assess range of motion  - Assess patient's mobility; develop plan if impaired  - Assess patient's need for assistive devices and provide as appropriate  - Encourage maximum independence but intervene and supervise when necessary  - Involve family in performance of ADLs  - Assess for home care needs following discharge   - Consider OT consult to assist with ADL evaluation and planning for discharge  - Provide patient education as appropriate  Outcome: Progressing  Goal: Maintains/Returns to pre admission functional level  Description: INTERVENTIONS:  - Perform BMAT or MOVE assessment daily    - Set and communicate daily mobility goal to care team and patient/family/caregiver     - Collaborate with rehabilitation services on mobility goals if consulted  - Out of bed for meals 3 times a day  - Out of bed for toileting  - Record patient progress and toleration of activity level   Outcome: Progressing     Problem: PAIN - ADULT  Goal: Verbalizes/displays adequate comfort level or baseline comfort level  Description: Interventions:  - Encourage patient to monitor pain and request assistance  - Assess pain using appropriate pain scale  - Administer analgesics based on type and severity of pain and evaluate response  - Implement non-pharmacological measures as appropriate and evaluate response  - Consider cultural and social influences on pain and pain management  - Notify physician/advanced practitioner if interventions unsuccessful or patient reports new pain  Outcome: Progressing     Problem: INFECTION - ADULT  Goal: Absence or prevention of progression during hospitalization  Description: INTERVENTIONS:  - Assess and monitor for signs and symptoms of infection  - Monitor lab/diagnostic results  - Monitor all insertion sites, i e  indwelling lines, tubes, and drains  - Monitor endotracheal if appropriate and nasal secretions for changes in amount and color  - [place] appropriate cooling/warming therapies per order  - Administer medications as ordered  - Instruct and encourage patient and family to use good hand hygiene technique  - Identify and instruct in appropriate isolation precautions for identified infection/condition  Outcome: Progressing     Problem: DISCHARGE PLANNING  Goal: Discharge to home or other facility with appropriate resources  Description: INTERVENTIONS:  - Identify barriers to discharge w/patient and caregiver  - Arrange for needed discharge resources and transportation as appropriate  - Identify discharge learning needs (meds, wound care, etc )  - Arrange for interpretive services to assist at discharge as needed  - Refer to Case Management Department for coordinating discharge planning if the patient needs post-hospital services based on physician/advanced practitioner order or complex needs related to functional status, cognitive ability, or social support system  Outcome: Progressing     Problem: Knowledge Deficit  Goal: Patient/family/caregiver demonstrates understanding of disease process, treatment plan, medications, and discharge instructions  Description: Complete learning assessment and assess knowledge base    Interventions:  - Provide teaching at level of understanding  - Provide teaching via preferred learning methods  Outcome: Progressing     Problem: NEUROSENSORY - ADULT  Goal: Achieves stable or improved neurological status  Description: INTERVENTIONS  - Monitor and report changes in neurological status  - Monitor vital signs such as temperature, blood pressure, glucose, and any other labs ordered   - Initiate measures to prevent increased intracranial pressure  - Monitor for seizure activity and implement precautions if appropriate      Outcome: Progressing  Goal: Remains free of injury related to seizures activity  Description: INTERVENTIONS  - Maintain airway, patient safety  and administer oxygen as ordered  - Monitor patient for seizure activity, document and report duration and description of seizure to physician/advanced practitioner  - If seizure occurs,  ensure patient safety during seizure  - Reorient patient post seizure  - Seizure pads on all 4 side rails  - Instruct patient/family to notify RN of any seizure activity including if an aura is experienced  - Instruct patient/family to call for assistance with activity based on nursing assessment  - Administer anti-seizure medications if ordered    Outcome: Progressing  Goal: Achieves maximal functionality and self care  Description: INTERVENTIONS  - Monitor swallowing and airway patency with patient fatigue and changes in neurological status  - Encourage and assist patient to increase activity and self care     - Encourage visually impaired, hearing impaired and aphasic patients to use assistive/communication devices  Outcome: Progressing     Problem: MUSCULOSKELETAL - ADULT  Goal: Maintain or return mobility to safest level of function  Description: INTERVENTIONS:  - Assess patient's ability to carry out ADLs; assess patient's baseline for ADL function and identify physical deficits which impact ability to perform ADLs (bathing, care of mouth/teeth, toileting, grooming, dressing, etc )  - Assess/evaluate cause of self-care deficits   - Assess range of motion  - Assess patient's mobility  - Assess patient's need for assistive devices and provide as appropriate  - Encourage maximum independence but intervene and supervise when necessary  - Involve family in performance of ADLs  - Assess for home care needs following discharge   - Consider OT consult to assist with ADL evaluation and planning for discharge  - Provide patient education as appropriate  Outcome: Progressing  Goal: Maintain proper alignment of affected body part  Description: INTERVENTIONS:  - Support, maintain and protect limb and body alignment  - Provide patient/ family with appropriate education  Outcome: Progressing     Problem: Neurological Deficit  Goal: Neurological status is stable or improving  Description: Interventions:  - Monitor and assess patient's level of consciousness, motor function, sensory function, and level of assistance needed for ADLs  - Monitor and report changes from baseline  Collaborate with interdisciplinary team to initiate plan and implement interventions as ordered  - Provide and maintain a safe environment  - Consider seizure precautions  - Consider fall precautions  - Consider aspiration precautions  - Consider bleeding precautions  Outcome: Progressing     Problem: Activity Intolerance/Impaired Mobility  Goal: Mobility/activity is maintained at optimum level for patient  Description: Interventions:  - Assess and monitor patient  barriers to mobility and need for assistive/adaptive devices  - Assess patient's emotional response to limitations  - Collaborate with interdisciplinary team and initiate plans and interventions as ordered  - Encourage independent activity per ability   - Maintain proper body alignment  - Perform active/passive rom as tolerated/ordered  - Plan activities to conserve energy   - Turn patient as appropriate  Outcome: Progressing     Problem: Communication Impairment  Goal: Ability to express needs and understand communication  Description: Assess patient's communication skills and ability to understand information  Patient will demonstrate use of effective communication techniques, alternative methods of communication and understanding even if not able to speak       - Encourage communication and provide alternate methods of communication as needed  - Collaborate with case management/ for discharge needs  - Include patient/family/caregiver in decisions related to communication  Outcome: Progressing     Problem: Potential for Aspiration  Goal: Non-ventilated patient's risk of aspiration is minimized  Description: Assess and monitor vital signs, respiratory status, and labs (WBC)  Monitor for signs of aspiration (tachypnea, cough, rales, wheezing, cyanosis, fever)  - Assess and monitor patient's ability to swallow  - Place patient up in chair to eat if possible  - HOB up at 90 degrees to eat if unable to get patient up into chair   - Supervise patient during oral intake  - Instruct patient/ family to take small bites  - Instruct patient/ family to take small single sips when taking liquids  - Follow patient-specific strategies generated by speech pathologist   Outcome: Progressing     Problem: Nutrition  Goal: Nutrition/Hydration status is improving  Description: Monitor and assess patient's nutrition/hydration status for malnutrition (ex- brittle hair, bruises, dry skin, pale skin and conjunctiva, muscle wasting, smooth red tongue, and disorientation)  Collaborate with interdisciplinary team and initiate plan and interventions as ordered  Monitor patient's weight and dietary intake as ordered or per policy  Utilize nutrition screening tool and intervene per policy  Determine patient's food preferences and provide high-protein, high-caloric foods as appropriate  - Assist patient with eating   - Allow adequate time for meals   - Encourage patient to take dietary supplement as ordered  - Collaborate with clinical nutritionist   - Include patient/family/caregiver in decisions related to nutrition    Outcome: Progressing     Problem: Prexisting or High Potential for Compromised Skin Integrity  Goal: Skin integrity is maintained or improved  Description: INTERVENTIONS:  - Identify patients at risk for skin breakdown  - Assess and monitor skin integrity  - Assess and monitor nutrition and hydration status  - Monitor labs   - Assess for incontinence   - Turn and reposition patient  - Assist with mobility/ambulation  - Relieve pressure over bony prominences  - Avoid friction and shearing  - Provide appropriate hygiene as needed including keeping skin clean and dry  - Evaluate need for skin moisturizer/barrier cream  - Collaborate with interdisciplinary team   - Patient/family teaching  - Consider wound care consult   Outcome: Progressing     Problem: Potential for Falls  Goal: Patient will remain free of falls  Description: INTERVENTIONS:  - Educate patient/family on patient safety including physical limitations  - Instruct patient to call for assistance with activity   - Consult OT/PT to assist with strengthening/mobility   - Keep Call bell within reach  - Keep bed low and locked with side rails adjusted as appropriate  - Keep care items and personal belongings within reach  - Initiate and maintain comfort rounds  - Make Fall Risk Sign visible to staff  - Offer Toileting every    Hours, in advance of need  - Initiate/Maintain alarm  - Obtain necessary fall risk management equipment:     - Apply yellow socks and bracelet for high fall risk patients  - Consider moving patient to room near nurses station  Outcome: Progressing     Problem: Nutrition/Hydration-ADULT  Goal: Nutrient/Hydration intake appropriate for improving, restoring or maintaining nutritional needs  Description: Monitor and assess patient's nutrition/hydration status for malnutrition  Collaborate with interdisciplinary team and initiate plan and interventions as ordered  Monitor patient's weight and dietary intake as ordered or per policy  Utilize nutrition screening tool and intervene as necessary  Determine patient's food preferences and provide high-protein, high-caloric foods as appropriate       INTERVENTIONS:  - Monitor oral intake, urinary output, labs, and treatment plans  - Assess nutrition and hydration status and recommend course of action  - Evaluate amount of meals eaten  - Assist patient with eating if necessary   - Allow adequate time for meals  - Recommend/ encourage appropriate diets, oral nutritional supplements, and vitamin/mineral supplements  - Order, calculate, and assess calorie counts as needed  - Recommend, monitor, and adjust tube feedings and TPN/PPN based on assessed needs  - Assess need for intravenous fluids  - Provide specific nutrition/hydration education as appropriate  - Include patient/family/caregiver in decisions related to nutrition  Outcome: Progressing     Problem: SKIN/TISSUE INTEGRITY - ADULT  Goal: Incision(s), wounds(s) or drain site(s) healing without S/S of infection  Description: INTERVENTIONS  - Assess and document dressing, incision, wound bed, drain sites and surrounding tissue  - Provide patient and family education  - Perform skin care/dressing changes   Outcome: Progressing  Goal: Pressure injury heals and does not worsen  Description: Interventions:  - Implement low air loss mattress or specialty surface (Criteria met)  - Apply silicone foam dressing  - Instruct/assist with weight shifting every    minutes when in chair   - Limit chair time to    hour intervals  - Use special pressure reducing interventions such as      when in chair   - Apply fecal or urinary incontinence containment device   - Perform passive or active ROM every     - Turn and reposition patient & offload bony prominences every    hours   - Utilize friction reducing device or surface for transfers   - Consider consults to  interdisciplinary teams such as     - Use incontinent care products after each incontinent episode such as     - Consider nutrition services referral as needed  Outcome: Progressing

## 2022-05-02 NOTE — ASSESSMENT & PLAN NOTE
Discussion has done informed of patient's, with patient's son, and case management  As per neuropsych, patient is incapable to make informed decision

## 2022-05-02 NOTE — PROGRESS NOTES
[address]  Progress Note - Ginny Cavazos 1960, 63 y o  male MRN: [id number]  Unit/Bed#: -[facility] Encounter: [id number]  Primary Care Provider: Aditya Light MD   Date and time admitted to hospital: 4/25/2022 10:18 AM    * Toxic encephalopathy  Assessment & Plan  Most likely secondary to Suboxone and Valium  MRI of the brain also shows empty sella    Mental status appears to have returned to baseline, will discuss with patient's family    Resume home medications    Neurology consult appreciated  PT OT recommends to discharge patient on rehab-since patient is on Suboxone, which can affect the discharge planning and placement  Case management on board      Bipolar affective disorder [facility])  Assessment & Plan  Continue home regimen of Valium, Cymbalta and [place]  Psychiatry consult appreciated    Sacral decubitus ulcer, stage III ([facility] [address] )  Assessment & Plan  Continue wound care  Follow wound culture  Wound culture shows E coli, Gram-positive rods, Gram-positive cocci-sensitive to Augmentin, started Augmentin for 7 days  Closed fracture of multiple ribs of left side with routine healing  Assessment & Plan  CT chest and pelvis shows There are fractures of the left 6, 7th, 8th, and 9th ribs with reactive bone formation present suggesting that these fractures are subacute or less likely chronic  Continue pain control    These do not appear to be acute fractures and hence continue supportive care  PT OT recommends rehab placement, but since patient is on Suboxone, unable to find place around this area-case management on board,    Other hyperlipidemia  Assessment & Plan  Continue statin therapy    Swelling of left upper extremity  Assessment & Plan  Unknown etiology  Patient does have history of left humeral fracture-and left upper extremity on triangular sling for immobilization  venous duplex of left upper extremity negative  DC IV fluid    Left humeral fracture  Assessment & Plan  Patient had left humeral fracture diagnosed on 2022 on an ED visit  Still present on imaging  · Orthopedic recommendation appreciated:Patient may not lift more than 1 lb with the left upper extremity  · PT/OT pendulum exercises, active assisted range of motion, below shoulder height left upper extremity  PT OT recommends rehab placement, but since patient is on Suboxone, unable to find place around this area-case management on board,      Empty [facility])  Assessment & Plan  Incidental finding  TSH normal, does not have any visual change  No electrolyte imbalance  No signs of symptoms of acromegaly,  Patient will need outpatient follow-up with Neurology and Ophthalmology-as per Neurology recommendation    Goals of care, counseling/discussion  Assessment & Plan  Discussion has done informed of patient's, with patient's son, and case management  As per neuropsych, patient is incapable to make informed decision  Opioid dependence (HCC)  Assessment & Plan  chronic back pain, evidenced by daily use of Suboxone 8mg three times a day, requiring continued regime in hospital      PT OT recommends rehab placement, but since patient is on Suboxone, unable to find place around this area-case management on board,      Back pain  Assessment & Plan  Patient is complaining of chronic back pain  Continue Suboxone as per home regimen starting tomorrow  Patient is seen by addiction clinic [facility]  Note reviewed  He is supposed to be on  mg 1 film sublingual t i d  Code Status: Level 1 - Full Code    Subjective:   No acute complaints    Objective:     Vitals:   Temp (24hrs), Av 6 °F (37 °C), Min:98 5 °F (36 9 °C), Max:98 7 °F (37 1 °C)    Temp:  [98 5 °F (36 9 °C)-98 7 °F (37 1 °C)] 98 7 °F (37 1 °C)  HR:  [73-82] 77  Resp:  [14-19] 19  BP: (117-129)/(73-79) 129/79  SpO2:  [94 %-95 %] 94 %  Body mass index is 26 01 kg/m²       Input and Output Summary (last 24 hours): Intake/Output Summary (Last 24 hours) at 5/2/2022 1314  Last data filed at 5/2/2022 6053  Gross per 24 hour   Intake 1345 ml   Output 4075 ml   Net -2730 ml       Physical Exam:   Physical Exam  Vitals and nursing note reviewed  HENT:      Head: Normocephalic and atraumatic  Right Ear: External ear normal       Left Ear: External ear normal    Cardiovascular:      Rate and Rhythm: Normal rate  Pulses: Normal pulses  Pulmonary:      Effort: Pulmonary effort is normal    Musculoskeletal:         General: Swelling present  Normal range of motion  Cervical back: Normal range of motion  Comments: Left upper extremity edema   Skin:     Coloration: Skin is pale  Neurological:      General: No focal deficit present  Mental Status: He is alert  Psychiatric:         Mood and Affect: Mood normal          Behavior: Behavior normal          Thought Content: Thought content normal          Judgment: Judgment normal           Additional Data:     Labs:  Results from last 7 days   Lab Units 04/28/22  1537   WBC Thousand/uL 4 37   HEMOGLOBIN g/dL 9 2*   HEMATOCRIT % 27 5*   PLATELETS Thousands/uL 364   NEUTROS PCT % 61   LYMPHS PCT % 21   MONOS PCT % 7   EOS PCT % 10*     Results from last 7 days   Lab Units 04/26/22  0430   SODIUM mmol/L 139   POTASSIUM mmol/L 3 5   CHLORIDE mmol/L 105   CO2 mmol/L 26   BUN mg/dL 11   CREATININE mg/dL 0 67   ANION GAP mmol/L 8   CALCIUM mg/dL 8 1*   ALBUMIN g/dL 2 9*   TOTAL BILIRUBIN mg/dL 0 48   ALK PHOS U/L 96   ALT U/L 21   AST U/L 17   GLUCOSE RANDOM mg/dL 100             Results from last 7 days   Lab Units 04/26/22  0430   HEMOGLOBIN A1C % 5 0           Lines/Drains:  Invasive Devices  Report    Peripheral Intravenous Line            Peripheral IV 05/01/22 Dorsal (posterior); Right Hand 1 day                      Imaging: No pertinent imaging reviewed      Recent Cultures (last 7 days):   Results from last 7 days   Lab Units 04/27/22  [facility] RESULT  No polys seen*  Rare Gram positive cocci in pairs*  Rare Gram positive rods*   WOUND CULTURE  3+ Growth of Escherichia coli*  3+ Growth of   3+ Growth of Bacillus species NOT anthracis*       Last 24 Hours Medication List:   Current Facility-Administered Medications   Medication Dose Route Frequency Provider Last Rate    acetaminophen  650 mg Oral Q6H PRN Noemí Ramirez MD      amoxicillin-clavulanate  1 tablet Oral Q12H [address] Veda Marcos MD      aspirin  81 mg Oral Daily Noemí Ramirez MD      atorvastatin  40 mg Oral QPM Noemí Ramirez MD      buprenorphine-naloxone  8 mg Sublingual TID Noemí Ramirez MD      calcium carbonate  1,000 mg Oral Daily PRN Noemí Ramirez MD      diazepam  5 mg Oral Q8H PRN Noemí Ramirez MD      DULoxetine  120 mg Oral Daily Noemí Ramirez MD      heparin (porcine)  5,000 Units Subcutaneous [facility] Veda Marcos MD      ibuprofen  600 mg Oral Q6H PRN Veda Marcos MD      lidocaine  1 patch Topical Daily Veda Marcos MD      lurasidone  20 mg Oral HS Noemí Ramirez MD      nicotine  1 patch Transdermal Daily Noemí aRmirez MD      ondansetron  4 mg Intravenous Q6H PRN Noemí Ramirez MD      saccharomyces boulardii  250 mg Oral BID Veda Marcos MD      tamsulosin  0 4 mg Oral Daily With Dinner Noemí Ramirez MD      traZODone  150 mg Oral HS Noemí Ramirez MD          Today, Patient Was Seen By: Huang Mena DO    **Please Note: This note may have been constructed using a voice recognition system  **

## 2022-05-02 NOTE — ASSESSMENT & PLAN NOTE
Most likely secondary to Suboxone and Valium  MRI of the brain also shows empty sella    Mental status appears to have returned to baseline, will discuss with patient's family    Resume home medications    Neurology consult appreciated  PT OT recommends to discharge patient on rehab-since patient is on Suboxone, which can affect the discharge planning and placement    Case management on board

## 2022-05-03 LAB
ALBUMIN SERPL BCP-MCNC: 2.5 G/DL (ref 3.5–5)
ALP SERPL-CCNC: 99 U/L (ref 46–116)
ALT SERPL W P-5'-P-CCNC: 10 U/L (ref 12–78)
ANION GAP SERPL CALCULATED.3IONS-SCNC: 5 MMOL/L (ref 4–13)
AST SERPL W P-5'-P-CCNC: 18 U/L (ref 5–45)
BASOPHILS # BLD AUTO: 0.02 THOUSANDS/ΜL (ref 0–0.1)
BASOPHILS NFR BLD AUTO: 1 % (ref 0–1)
BILIRUB SERPL-MCNC: 0.22 MG/DL (ref 0.2–1)
BUN SERPL-MCNC: 6 MG/DL (ref 5–25)
CALCIUM ALBUM COR SERPL-MCNC: 8.9 MG/DL (ref 8.3–10.1)
CALCIUM SERPL-MCNC: 7.7 MG/DL (ref 8.3–10.1)
CHLORIDE SERPL-SCNC: 106 MMOL/L (ref 100–108)
CO2 SERPL-SCNC: 30 MMOL/L (ref 21–32)
CREAT SERPL-MCNC: 0.64 MG/DL (ref 0.6–1.3)
EOSINOPHIL # BLD AUTO: 0.39 THOUSAND/ΜL (ref 0–0.61)
EOSINOPHIL NFR BLD AUTO: 10 % (ref 0–6)
ERYTHROCYTE [DISTWIDTH] IN BLOOD BY AUTOMATED COUNT: 14.7 % (ref 11.6–15.1)
GFR SERPL CREATININE-BSD FRML MDRD: 106 ML/MIN/1.73SQ M
GLUCOSE SERPL-MCNC: 101 MG/DL (ref 65–140)
HCT VFR BLD AUTO: 28.4 % (ref 36.5–49.3)
HGB BLD-MCNC: 9.3 G/DL (ref 12–17)
IMM GRANULOCYTES # BLD AUTO: 0.01 THOUSAND/UL (ref 0–0.2)
IMM GRANULOCYTES NFR BLD AUTO: 0 % (ref 0–2)
INR PPP: 0.97 (ref 0.84–1.19)
LYMPHOCYTES # BLD AUTO: 1.08 THOUSANDS/ΜL (ref 0.6–4.47)
LYMPHOCYTES NFR BLD AUTO: 27 % (ref 14–44)
MAGNESIUM SERPL-MCNC: 1.8 MG/DL (ref 1.6–2.6)
MCH RBC QN AUTO: 35 PG (ref 26.8–34.3)
MCHC RBC AUTO-ENTMCNC: 32.7 G/DL (ref 31.4–37.4)
MCV RBC AUTO: 107 FL (ref 82–98)
MONOCYTES # BLD AUTO: 0.37 THOUSAND/ΜL (ref 0.17–1.22)
MONOCYTES NFR BLD AUTO: 9 % (ref 4–12)
NEUTROPHILS # BLD AUTO: 2.09 THOUSANDS/ΜL (ref 1.85–7.62)
NEUTS SEG NFR BLD AUTO: 53 % (ref 43–75)
NRBC BLD AUTO-RTO: 0 /100 WBCS
PHOSPHATE SERPL-MCNC: 4.3 MG/DL (ref 2.3–4.1)
PLATELET # BLD AUTO: 347 THOUSANDS/UL (ref 149–390)
PMV BLD AUTO: 8.9 FL (ref 8.9–12.7)
POTASSIUM SERPL-SCNC: 3.7 MMOL/L (ref 3.5–5.3)
PROCALCITONIN SERPL-MCNC: <0.05 NG/ML
PROT SERPL-MCNC: 5.3 G/DL (ref 6.4–8.2)
PROTHROMBIN TIME: 12.8 SECONDS (ref 11.6–14.5)
RBC # BLD AUTO: 2.66 MILLION/UL (ref 3.88–5.62)
SODIUM SERPL-SCNC: 141 MMOL/L (ref 136–145)
WBC # BLD AUTO: 3.96 THOUSAND/UL (ref 4.31–10.16)

## 2022-05-03 PROCEDURE — 80053 COMPREHEN METABOLIC PANEL: CPT | Performed by: INTERNAL MEDICINE

## 2022-05-03 PROCEDURE — 85610 PROTHROMBIN TIME: CPT | Performed by: INTERNAL MEDICINE

## 2022-05-03 PROCEDURE — 83735 ASSAY OF MAGNESIUM: CPT | Performed by: INTERNAL MEDICINE

## 2022-05-03 PROCEDURE — 99232 SBSQ HOSP IP/OBS MODERATE 35: CPT | Performed by: STUDENT IN AN ORGANIZED HEALTH CARE EDUCATION/TRAINING PROGRAM

## 2022-05-03 PROCEDURE — 85025 COMPLETE CBC W/AUTO DIFF WBC: CPT | Performed by: INTERNAL MEDICINE

## 2022-05-03 PROCEDURE — 84145 PROCALCITONIN (PCT): CPT | Performed by: INTERNAL MEDICINE

## 2022-05-03 PROCEDURE — 84100 ASSAY OF PHOSPHORUS: CPT | Performed by: INTERNAL MEDICINE

## 2022-05-03 RX ADMIN — DIAZEPAM 5 MG: 5 TABLET ORAL at 15:20

## 2022-05-03 RX ADMIN — TAMSULOSIN HYDROCHLORIDE 0.4 MG: 0.4 CAPSULE ORAL at 17:51

## 2022-05-03 RX ADMIN — DIAZEPAM 5 MG: 5 TABLET ORAL at 05:19

## 2022-05-03 RX ADMIN — LIDOCAINE 5% 1 PATCH: 700 PATCH TOPICAL at 09:13

## 2022-05-03 RX ADMIN — DULOXETINE HYDROCHLORIDE 120 MG: 60 CAPSULE, DELAYED RELEASE ORAL at 09:12

## 2022-05-03 RX ADMIN — HEPARIN SODIUM 5000 UNITS: 5000 INJECTION INTRAVENOUS; SUBCUTANEOUS at 21:55

## 2022-05-03 RX ADMIN — AMOXICILLIN AND CLAVULANATE POTASSIUM 1 TABLET: 875; 125 TABLET, FILM COATED ORAL at 09:12

## 2022-05-03 RX ADMIN — BUPRENORPHINE AND NALOXONE 8 MG: 8; 2 FILM BUCCAL; SUBLINGUAL at 09:12

## 2022-05-03 RX ADMIN — Medication 250 MG: at 17:50

## 2022-05-03 RX ADMIN — ASPIRIN 81 MG CHEWABLE TABLET 81 MG: 81 TABLET CHEWABLE at 09:12

## 2022-05-03 RX ADMIN — NICOTINE 7 MG/24 HR DAILY TRANSDERMAL PATCH 1 PATCH: at 09:12

## 2022-05-03 RX ADMIN — ATORVASTATIN CALCIUM 40 MG: 40 TABLET, FILM COATED ORAL at 17:50

## 2022-05-03 RX ADMIN — AMOXICILLIN AND CLAVULANATE POTASSIUM 1 TABLET: 875; 125 TABLET, FILM COATED ORAL at 21:54

## 2022-05-03 RX ADMIN — BUPRENORPHINE AND NALOXONE 8 MG: 8; 2 FILM BUCCAL; SUBLINGUAL at 17:51

## 2022-05-03 RX ADMIN — Medication 250 MG: at 09:29

## 2022-05-03 RX ADMIN — HEPARIN SODIUM 5000 UNITS: 5000 INJECTION INTRAVENOUS; SUBCUTANEOUS at 05:19

## 2022-05-03 RX ADMIN — TRAZODONE HYDROCHLORIDE 150 MG: 100 TABLET ORAL at 21:54

## 2022-05-03 RX ADMIN — HEPARIN SODIUM 5000 UNITS: 5000 INJECTION INTRAVENOUS; SUBCUTANEOUS at 15:16

## 2022-05-03 RX ADMIN — LURASIDONE HYDROCHLORIDE 20 MG: 20 TABLET, FILM COATED ORAL at 21:55

## 2022-05-03 RX ADMIN — BUPRENORPHINE AND NALOXONE 8 MG: 8; 2 FILM BUCCAL; SUBLINGUAL at 21:55

## 2022-05-03 NOTE — NURSING NOTE
Pt very restless, agitated, and irritable, unable to be redirected when spoken to  Pt wanting to leave, pt unable to understand that he is not able to make own medical decisions  Provider spoke with patient  Pt still not in agreement with treatment plan  Pt given PRN antianxiety medication, will continue to monitor

## 2022-05-03 NOTE — PLAN OF CARE
Problem: MOBILITY - ADULT  Goal: Maintain or return to baseline ADL function  Description: INTERVENTIONS:  -  Assess patient's ability to carry out ADLs; assess patient's baseline for ADL function and identify physical deficits which impact ability to perform ADLs (bathing, care of mouth/teeth, toileting, grooming, dressing, etc )  - Assess/evaluate cause of self-care deficits   - Assess range of motion  - Assess patient's mobility; develop plan if impaired  - Assess patient's need for assistive devices and provide as appropriate  - Encourage maximum independence but intervene and supervise when necessary  - Involve family in performance of ADLs  - Assess for home care needs following discharge   - Consider OT consult to assist with ADL evaluation and planning for discharge  - Provide patient education as appropriate  Outcome: Progressing  Goal: Maintains/Returns to pre admission functional level  Description: INTERVENTIONS:  - Perform BMAT or MOVE assessment daily    - Set and communicate daily mobility goal to care team and patient/family/caregiver     - Collaborate with rehabilitation services on mobility goals if consulted  - Out of bed for meals 3 times a day  - Out of bed for toileting  - Record patient progress and toleration of activity level   Outcome: Progressing     Problem: PAIN - ADULT  Goal: Verbalizes/displays adequate comfort level or baseline comfort level  Description: Interventions:  - Encourage patient to monitor pain and request assistance  - Assess pain using appropriate pain scale  - Administer analgesics based on type and severity of pain and evaluate response  - Implement non-pharmacological measures as appropriate and evaluate response  - Consider cultural and social influences on pain and pain management  - Notify physician/advanced practitioner if interventions unsuccessful or patient reports new pain  Outcome: Progressing     Problem: INFECTION - ADULT  Goal: Absence or prevention of progression during hospitalization  Description: INTERVENTIONS:  - Assess and monitor for signs and symptoms of infection  - Monitor lab/diagnostic results  - Monitor all insertion sites, i e  indwelling lines, tubes, and drains  - Monitor endotracheal if appropriate and nasal secretions for changes in amount and color  - Roundup appropriate cooling/warming therapies per order  - Administer medications as ordered  - Instruct and encourage patient and family to use good hand hygiene technique  - Identify and instruct in appropriate isolation precautions for identified infection/condition  Outcome: Progressing     Problem: DISCHARGE PLANNING  Goal: Discharge to home or other facility with appropriate resources  Description: INTERVENTIONS:  - Identify barriers to discharge w/patient and caregiver  - Arrange for needed discharge resources and transportation as appropriate  - Identify discharge learning needs (meds, wound care, etc )  - Arrange for interpretive services to assist at discharge as needed  - Refer to Case Management Department for coordinating discharge planning if the patient needs post-hospital services based on physician/advanced practitioner order or complex needs related to functional status, cognitive ability, or social support system  Outcome: Progressing     Problem: Knowledge Deficit  Goal: Patient/family/caregiver demonstrates understanding of disease process, treatment plan, medications, and discharge instructions  Description: Complete learning assessment and assess knowledge base    Interventions:  - Provide teaching at level of understanding  - Provide teaching via preferred learning methods  Outcome: Progressing     Problem: NEUROSENSORY - ADULT  Goal: Achieves stable or improved neurological status  Description: INTERVENTIONS  - Monitor and report changes in neurological status  - Monitor vital signs such as temperature, blood pressure, glucose, and any other labs ordered   - Initiate measures to prevent increased intracranial pressure  - Monitor for seizure activity and implement precautions if appropriate      Outcome: Progressing  Goal: Remains free of injury related to seizures activity  Description: INTERVENTIONS  - Maintain airway, patient safety  and administer oxygen as ordered  - Monitor patient for seizure activity, document and report duration and description of seizure to physician/advanced practitioner  - If seizure occurs,  ensure patient safety during seizure  - Reorient patient post seizure  - Seizure pads on all 4 side rails  - Instruct patient/family to notify RN of any seizure activity including if an aura is experienced  - Instruct patient/family to call for assistance with activity based on nursing assessment  - Administer anti-seizure medications if ordered    Outcome: Progressing  Goal: Achieves maximal functionality and self care  Description: INTERVENTIONS  - Monitor swallowing and airway patency with patient fatigue and changes in neurological status  - Encourage and assist patient to increase activity and self care     - Encourage visually impaired, hearing impaired and aphasic patients to use assistive/communication devices  Outcome: Progressing     Problem: MUSCULOSKELETAL - ADULT  Goal: Maintain or return mobility to safest level of function  Description: INTERVENTIONS:  - Assess patient's ability to carry out ADLs; assess patient's baseline for ADL function and identify physical deficits which impact ability to perform ADLs (bathing, care of mouth/teeth, toileting, grooming, dressing, etc )  - Assess/evaluate cause of self-care deficits   - Assess range of motion  - Assess patient's mobility  - Assess patient's need for assistive devices and provide as appropriate  - Encourage maximum independence but intervene and supervise when necessary  - Involve family in performance of ADLs  - Assess for home care needs following discharge   - Consider OT consult to assist with ADL evaluation and planning for discharge  - Provide patient education as appropriate  Outcome: Progressing  Goal: Maintain proper alignment of affected body part  Description: INTERVENTIONS:  - Support, maintain and protect limb and body alignment  - Provide patient/ family with appropriate education  Outcome: Progressing     Problem: Neurological Deficit  Goal: Neurological status is stable or improving  Description: Interventions:  - Monitor and assess patient's level of consciousness, motor function, sensory function, and level of assistance needed for ADLs  - Monitor and report changes from baseline  Collaborate with interdisciplinary team to initiate plan and implement interventions as ordered  - Provide and maintain a safe environment  - Consider seizure precautions  - Consider fall precautions  - Consider aspiration precautions  - Consider bleeding precautions  Outcome: Progressing     Problem: Activity Intolerance/Impaired Mobility  Goal: Mobility/activity is maintained at optimum level for patient  Description: Interventions:  - Assess and monitor patient  barriers to mobility and need for assistive/adaptive devices  - Assess patient's emotional response to limitations  - Collaborate with interdisciplinary team and initiate plans and interventions as ordered  - Encourage independent activity per ability   - Maintain proper body alignment  - Perform active/passive rom as tolerated/ordered  - Plan activities to conserve energy   - Turn patient as appropriate  Outcome: Progressing     Problem: Communication Impairment  Goal: Ability to express needs and understand communication  Description: Assess patient's communication skills and ability to understand information  Patient will demonstrate use of effective communication techniques, alternative methods of communication and understanding even if not able to speak       - Encourage communication and provide alternate methods of communication as needed  - Collaborate with case management/ for discharge needs  - Include patient/family/caregiver in decisions related to communication  Outcome: Progressing     Problem: Potential for Aspiration  Goal: Non-ventilated patient's risk of aspiration is minimized  Description: Assess and monitor vital signs, respiratory status, and labs (WBC)  Monitor for signs of aspiration (tachypnea, cough, rales, wheezing, cyanosis, fever)  - Assess and monitor patient's ability to swallow  - Place patient up in chair to eat if possible  - HOB up at 90 degrees to eat if unable to get patient up into chair   - Supervise patient during oral intake  - Instruct patient/ family to take small bites  - Instruct patient/ family to take small single sips when taking liquids  - Follow patient-specific strategies generated by speech pathologist   Outcome: Progressing     Problem: Nutrition  Goal: Nutrition/Hydration status is improving  Description: Monitor and assess patient's nutrition/hydration status for malnutrition (ex- brittle hair, bruises, dry skin, pale skin and conjunctiva, muscle wasting, smooth red tongue, and disorientation)  Collaborate with interdisciplinary team and initiate plan and interventions as ordered  Monitor patient's weight and dietary intake as ordered or per policy  Utilize nutrition screening tool and intervene per policy  Determine patient's food preferences and provide high-protein, high-caloric foods as appropriate  - Assist patient with eating   - Allow adequate time for meals   - Encourage patient to take dietary supplement as ordered  - Collaborate with clinical nutritionist   - Include patient/family/caregiver in decisions related to nutrition    Outcome: Progressing     Problem: Prexisting or High Potential for Compromised Skin Integrity  Goal: Skin integrity is maintained or improved  Description: INTERVENTIONS:  - Identify patients at risk for skin breakdown  - Assess and monitor skin integrity  - Assess and monitor nutrition and hydration status  - Monitor labs   - Assess for incontinence   - Turn and reposition patient  - Assist with mobility/ambulation  - Relieve pressure over bony prominences  - Avoid friction and shearing  - Provide appropriate hygiene as needed including keeping skin clean and dry  - Evaluate need for skin moisturizer/barrier cream  - Collaborate with interdisciplinary team   - Patient/family teaching  - Consider wound care consult   Outcome: Progressing     Problem: Potential for Falls  Goal: Patient will remain free of falls  Description: INTERVENTIONS:  - Educate patient/family on patient safety including physical limitations  - Instruct patient to call for assistance with activity   - Consult OT/PT to assist with strengthening/mobility   - Keep Call bell within reach  - Keep bed low and locked with side rails adjusted as appropriate  - Keep care items and personal belongings within reach  - Initiate and maintain comfort rounds  - Make Fall Risk Sign visible to staff  - Offer Toileting every 2 Hours, in advance of need  - Initiate/Maintain bed alarm  - Obtain necessary fall risk management equipment:   - Apply yellow socks and bracelet for high fall risk patients  - Consider moving patient to room near nurses station  Outcome: Progressing     Problem: Nutrition/Hydration-ADULT  Goal: Nutrient/Hydration intake appropriate for improving, restoring or maintaining nutritional needs  Description: Monitor and assess patient's nutrition/hydration status for malnutrition  Collaborate with interdisciplinary team and initiate plan and interventions as ordered  Monitor patient's weight and dietary intake as ordered or per policy  Utilize nutrition screening tool and intervene as necessary  Determine patient's food preferences and provide high-protein, high-caloric foods as appropriate       INTERVENTIONS:  - Monitor oral intake, urinary output, labs, and treatment plans  - Assess nutrition and hydration status and recommend course of action  - Evaluate amount of meals eaten  - Assist patient with eating if necessary   - Allow adequate time for meals  - Recommend/ encourage appropriate diets, oral nutritional supplements, and vitamin/mineral supplements  - Order, calculate, and assess calorie counts as needed  - Recommend, monitor, and adjust tube feedings and TPN/PPN based on assessed needs  - Assess need for intravenous fluids  - Provide specific nutrition/hydration education as appropriate  - Include patient/family/caregiver in decisions related to nutrition  Outcome: Progressing     Problem: SKIN/TISSUE INTEGRITY - ADULT  Goal: Incision(s), wounds(s) or drain site(s) healing without S/S of infection  Description: INTERVENTIONS  - Assess and document dressing, incision, wound bed, drain sites and surrounding tissue  - Provide patient and family education  - Perform skin care/dressing changes   Outcome: Progressing  Goal: Pressure injury heals and does not worsen  Description: Interventions:  - Implement low air loss mattress or specialty surface (Criteria met)  - Apply silicone foam dressing  - Instruct/assist with weight shifting every 60 minutes when in chair   - Limit chair time to 2 hour intervals  - Use special pressure reducing interventions when in chair   - Apply fecal or urinary incontinence containment device   - Perform passive or active ROM every 2 hours  - Turn and reposition patient & offload bony prominences every 2 hours   - Utilize friction reducing device or surface for transfers   - Consider consults to  interdisciplinary teams such as pt ot   - Use incontinent care products after each incontinent episode such as   - Consider nutrition services referral as needed  Outcome: Progressing

## 2022-05-03 NOTE — PROGRESS NOTES
[address]  Progress Note - [facility] 1960, 63 y o  male MRN: [id number]  Unit/Bed#: -01 Encounter: [id number]  Primary Care Provider: Kerry Lemon MD   Date and time admitted to hospital: 4/25/2022 10:18 AM    Swelling of left upper extremity  Assessment & Plan  Unknown etiology  Patient does have history of left humeral fracture-and left upper extremity on triangular sling for immobilization  venous duplex of left upper extremity negative  DC IV fluid    Empty sella turcica ([facility] [address] )  Assessment & Plan  Incidental finding  TSH normal, does not have any visual change  No electrolyte imbalance  No signs of symptoms of acromegaly,  Patient will need outpatient follow-up with Neurology and Ophthalmology-as per Neurology recommendation    Goals of care, counseling/discussion  Assessment & Plan  Discussion has done informed of patient's, with patient's son, and case management  As per neuropsych, patient is incapable to make informed decision  Sacral decubitus ulcer, stage III ([facility] [address] )  Assessment & Plan  Continue wound care  Follow wound culture  Wound culture shows E coli, Gram-positive rods, Gram-positive cocci-sensitive to Augmentin, started Augmentin for 7 days  Opioid dependence (HCC)  Assessment & Plan  chronic back pain, evidenced by daily use of Suboxone 8mg three times a day, requiring continued regime in hospital      PT OT recommends rehab placement, but since patient is on Suboxone, unable to find place around this area-case management on board,      Left humeral fracture  Assessment & Plan  Patient had left humeral fracture diagnosed on 04/04/2022 on an ED visit  Still present on imaging      · Orthopedic recommendation appreciated:Patient may not lift more than 1 lb with the left upper extremity  · PT/OT pendulum exercises, active assisted range of motion, below shoulder height left upper extremity  PT OT recommends rehab placement, but since patient is on Suboxone, unable to find place around this area-case management on board,      Closed fracture of multiple ribs of left side with routine healing  Assessment & Plan  CT chest and pelvis shows There are fractures of the left 6, 7th, 8th, and 9th ribs with reactive bone formation present suggesting that these fractures are subacute or less likely chronic  Continue pain control  These do not appear to be acute fractures and hence continue supportive care  PT OT recommends rehab placement, but since patient is on Suboxone, unable to find place around this area-case management on board,    Back pain  Assessment & Plan  Patient is complaining of chronic back pain  Continue Suboxone as per home regimen starting tomorrow  Patient is seen by addiction clinic [facility] reviewed  He is supposed to be on 08/02 mg 1 film sublingual t i d     Other hyperlipidemia  Assessment & Plan  Continue statin therapy    Bipolar affective disorder [facility])  Assessment & Plan  Continue home regimen of Valium, Cymbalta and [address]  Psychiatry consult appreciated    * Toxic encephalopathy  Assessment & Plan  Most likely secondary to Suboxone and Valium  MRI of the brain also shows empty sella    Mental status appears to have returned to baseline, will discuss with patient's family    Resume home medications    Neurology consult appreciated  PT OT recommends to discharge patient on rehab-since patient is on Suboxone, which can affect the discharge planning and placement  Case management on board      Ecchymosis-resolved as of 5/2/2022  Assessment & Plan  Affecting left flank, left lateral chest, left upper thigh-remained stable  Resume heparin          VTE Pharmacologic Prophylaxis: VTE Score: 2 Low Risk (Score 0-2) - Encourage Ambulation  Patient Centered Rounds: I performed bedside rounds with nursing staff today    Discussions with Specialists or Other Care Team Provider: case management    Education and Discussions with Family / Patient: unable to call, will call tomorrow  Time Spent for Care: 30 minutes  More than 50% of total time spent on counseling and coordination of care as described above  Current Length of Stay: 8 day(s)  Current Patient Status: Inpatient   Certification Statement: The patient will continue to require additional inpatient hospital stay due to placement  Discharge Plan: once bed is found at rehab facility    Code Status: Level 1 - Full Code    Subjective:   Patient seen examined at bedside  No acute events overnight  Patient requesting to be discharged  Explained to patient that unfortunately due to him not having capacity to make decisions at this point, im unable to discharge him home  Objective:     Vitals:   Temp (24hrs), Av °F (36 7 °C), Min:97 6 °F (36 4 °C), Max:98 4 °F (36 9 °C)    Temp:  [97 6 °F (36 4 °C)-98 4 °F (36 9 °C)] 98 4 °F (36 9 °C)  HR:  [71-80] 80  Resp:  [15-18] 18  BP: (112-124)/(68-85) 112/73  SpO2:  [93 %-97 %] 97 %  Body mass index is 26 01 kg/m²  Input and Output Summary (last 24 hours): Intake/Output Summary (Last 24 hours) at 5/3/2022 1753  Last data filed at 5/3/2022 1708  Gross per 24 hour   Intake 222 ml   Output 2125 ml   Net -1903 ml       Physical Exam:   Physical Exam  Vitals reviewed  HENT:      Head: Normocephalic and atraumatic  Right Ear: External ear normal       Left Ear: External ear normal       Nose: Nose normal       Mouth/Throat:      Mouth: Mucous membranes are moist       Pharynx: Oropharynx is clear  Eyes:      Extraocular Movements: Extraocular movements intact  Cardiovascular:      Rate and Rhythm: Normal rate and regular rhythm  Pulses: Normal pulses  Heart sounds: Normal heart sounds  Pulmonary:      Effort: Pulmonary effort is normal       Breath sounds: Normal breath sounds  Abdominal:      General: Abdomen is flat  Palpations: Abdomen is soft  Tenderness:  There is no abdominal tenderness  Musculoskeletal:         General: Normal range of motion  Cervical back: Normal range of motion  Skin:     General: Skin is warm and dry  Neurological:      General: No focal deficit present  Mental Status: He is alert  Mental status is at baseline  Psychiatric:         Mood and Affect: Mood normal          Behavior: Behavior normal           Additional Data:     Labs:  Results from last 7 days   Lab Units 05/03/22  0538   WBC Thousand/uL 3 96*   HEMOGLOBIN g/dL 9 3*   HEMATOCRIT % 28 4*   PLATELETS Thousands/uL 347   NEUTROS PCT % 53   LYMPHS PCT % 27   MONOS PCT % 9   EOS PCT % 10*     Results from last 7 days   Lab Units 05/03/22  0538   SODIUM mmol/L 141   POTASSIUM mmol/L 3 7   CHLORIDE mmol/L 106   CO2 mmol/L 30   BUN mg/dL 6   CREATININE mg/dL 0 64   ANION GAP mmol/L 5   CALCIUM mg/dL 7 7*   ALBUMIN g/dL 2 5*   TOTAL BILIRUBIN mg/dL 0 22   ALK PHOS U/L 99   ALT U/L 10*   AST U/L 18   GLUCOSE RANDOM mg/dL 101     Results from last 7 days   Lab Units 05/03/22  0633   INR  0 97             Results from last 7 days   Lab Units 05/03/22  0538   PROCALCITONIN ng/ml <0 05       Lines/Drains:  Invasive Devices  Report    Peripheral Intravenous Line            Peripheral IV 05/01/22 Dorsal (posterior); Right Hand 2 days                      Imaging: Reviewed radiology reports from this admission including: ultrasound(s)    Recent Cultures (last 7 days):   Results from last 7 days   Lab Units 04/27/22  1212   GRAM STAIN RESULT  No polys seen*  Rare Gram positive cocci in pairs*  Rare Gram positive rods*   WOUND CULTURE  3+ Growth of Escherichia coli*  3+ Growth of   3+ Growth of Bacillus species NOT anthracis*       Last 24 Hours Medication List:   Current Facility-Administered Medications   Medication Dose Route Frequency Provider Last Rate    acetaminophen  650 mg Oral Q6H PRN Falguni Osborne MD      amoxicillin-clavulanate  1 tablet Oral Q12H [address] Michelle Portillo MD     [facility] aspirin  81 mg Oral Daily Starla Bates MD      atorvastatin  40 mg Oral QPM Starla Bates MD      buprenorphine-naloxone  8 mg Sublingual TID Starla Bates MD      calcium carbonate  1,000 mg Oral Daily PRN Starla Bates MD      diazepam  5 mg Oral Q8H PRN Starla Bates MD      DULoxetine  120 mg Oral Daily Starla Bates MD      heparin (porcine)  5,000 Units Subcutaneous [facility] Prashanth Mustafa MD      ibuprofen  600 mg Oral Q6H PRN Prashanth Mustafa MD      lidocaine  1 patch Topical Daily Prashanth Mustafa MD      lurasidone  20 mg Oral HS Starla Bates MD      nicotine  1 patch Transdermal Daily Starla Bates MD      ondansetron  4 mg Intravenous Q6H PRN Starla Bates MD      saccharomyces boulardii  250 mg Oral BID Prashanth Mustafa MD      tamsulosin  0 4 mg Oral Daily With Chioma Crespo MD      traZODone  150 mg Oral HS Starla Bates MD          Today, Patient Was Seen By: Parisa Connors DO    **Please Note: This note may have been constructed using a voice recognition system  **

## 2022-05-03 NOTE — ASSESSMENT & PLAN NOTE
Patient is complaining of chronic back pain  Continue Suboxone as per home regimen starting tomorrow  Patient is seen by addiction clinic [facility]  Note reviewed    He is supposed to be on 08/02 mg 1 film sublingual t i d

## 2022-05-03 NOTE — CASE MANAGEMENT
Case Management Progress Note    Patient name [facility]  Location [address] 334/-61 MRN [id number]  : 1960 Date 5/3/2022       LOS (days): 8  Geometric Mean LOS (GMLOS) (days): 4 20  Days to GMLOS:-3 7        OBJECTIVE:        Current admission status: Inpatient  Preferred Pharmacy:   [facility] # [address] [address]  Phone: [phone] Fax: [phone]    Primary Care Provider: David Macias MD    Primary Insurance: MEDICARE  Secondary Insurance: [address] [facility] NOTE:    Additional referral placed to: [place] , [place], [place], Oma Harada, [facility], [facility],  [place], Reta Bishop [address], [facility] and [facility]    Awaiting reply

## 2022-05-04 LAB
ALBUMIN SERPL BCP-MCNC: 2.4 G/DL (ref 3.5–5)
ANION GAP SERPL CALCULATED.3IONS-SCNC: 2 MMOL/L (ref 4–13)
BASOPHILS # BLD AUTO: 0.02 THOUSANDS/ΜL (ref 0–0.1)
BASOPHILS NFR BLD AUTO: 0 % (ref 0–1)
BUN SERPL-MCNC: 6 MG/DL (ref 5–25)
CALCIUM ALBUM COR SERPL-MCNC: 9.1 MG/DL (ref 8.3–10.1)
CALCIUM SERPL-MCNC: 7.8 MG/DL (ref 8.3–10.1)
CHLORIDE SERPL-SCNC: 106 MMOL/L (ref 100–108)
CO2 SERPL-SCNC: 32 MMOL/L (ref 21–32)
CREAT SERPL-MCNC: 0.63 MG/DL (ref 0.6–1.3)
EOSINOPHIL # BLD AUTO: 0.35 THOUSAND/ΜL (ref 0–0.61)
EOSINOPHIL NFR BLD AUTO: 8 % (ref 0–6)
ERYTHROCYTE [DISTWIDTH] IN BLOOD BY AUTOMATED COUNT: 14.6 % (ref 11.6–15.1)
GFR SERPL CREATININE-BSD FRML MDRD: 107 ML/MIN/1.73SQ M
GLUCOSE SERPL-MCNC: 104 MG/DL (ref 65–140)
HCT VFR BLD AUTO: 27.5 % (ref 36.5–49.3)
HGB BLD-MCNC: 8.7 G/DL (ref 12–17)
IMM GRANULOCYTES # BLD AUTO: 0.02 THOUSAND/UL (ref 0–0.2)
IMM GRANULOCYTES NFR BLD AUTO: 0 % (ref 0–2)
LYMPHOCYTES # BLD AUTO: 1.06 THOUSANDS/ΜL (ref 0.6–4.47)
LYMPHOCYTES NFR BLD AUTO: 23 % (ref 14–44)
MAGNESIUM SERPL-MCNC: 1.9 MG/DL (ref 1.6–2.6)
MCH RBC QN AUTO: 33.9 PG (ref 26.8–34.3)
MCHC RBC AUTO-ENTMCNC: 31.6 G/DL (ref 31.4–37.4)
MCV RBC AUTO: 107 FL (ref 82–98)
MONOCYTES # BLD AUTO: 0.45 THOUSAND/ΜL (ref 0.17–1.22)
MONOCYTES NFR BLD AUTO: 10 % (ref 4–12)
NEUTROPHILS # BLD AUTO: 2.69 THOUSANDS/ΜL (ref 1.85–7.62)
NEUTS SEG NFR BLD AUTO: 59 % (ref 43–75)
NRBC BLD AUTO-RTO: 0 /100 WBCS
PHOSPHATE SERPL-MCNC: 4.5 MG/DL (ref 2.3–4.1)
PLATELET # BLD AUTO: 342 THOUSANDS/UL (ref 149–390)
PMV BLD AUTO: 9 FL (ref 8.9–12.7)
POTASSIUM SERPL-SCNC: 3.9 MMOL/L (ref 3.5–5.3)
RBC # BLD AUTO: 2.57 MILLION/UL (ref 3.88–5.62)
SODIUM SERPL-SCNC: 140 MMOL/L (ref 136–145)
WBC # BLD AUTO: 4.59 THOUSAND/UL (ref 4.31–10.16)

## 2022-05-04 PROCEDURE — 99232 SBSQ HOSP IP/OBS MODERATE 35: CPT

## 2022-05-04 PROCEDURE — 83735 ASSAY OF MAGNESIUM: CPT | Performed by: STUDENT IN AN ORGANIZED HEALTH CARE EDUCATION/TRAINING PROGRAM

## 2022-05-04 PROCEDURE — 80069 RENAL FUNCTION PANEL: CPT | Performed by: STUDENT IN AN ORGANIZED HEALTH CARE EDUCATION/TRAINING PROGRAM

## 2022-05-04 PROCEDURE — 85025 COMPLETE CBC W/AUTO DIFF WBC: CPT | Performed by: STUDENT IN AN ORGANIZED HEALTH CARE EDUCATION/TRAINING PROGRAM

## 2022-05-04 RX ADMIN — HEPARIN SODIUM 5000 UNITS: 5000 INJECTION INTRAVENOUS; SUBCUTANEOUS at 05:51

## 2022-05-04 RX ADMIN — DIAZEPAM 5 MG: 5 TABLET ORAL at 19:31

## 2022-05-04 RX ADMIN — LURASIDONE HYDROCHLORIDE 20 MG: 20 TABLET, FILM COATED ORAL at 21:30

## 2022-05-04 RX ADMIN — BUPRENORPHINE AND NALOXONE 8 MG: 8; 2 FILM BUCCAL; SUBLINGUAL at 09:28

## 2022-05-04 RX ADMIN — DIAZEPAM 5 MG: 5 TABLET ORAL at 09:28

## 2022-05-04 RX ADMIN — DULOXETINE HYDROCHLORIDE 120 MG: 60 CAPSULE, DELAYED RELEASE ORAL at 09:28

## 2022-05-04 RX ADMIN — HEPARIN SODIUM 5000 UNITS: 5000 INJECTION INTRAVENOUS; SUBCUTANEOUS at 15:30

## 2022-05-04 RX ADMIN — AMOXICILLIN AND CLAVULANATE POTASSIUM 1 TABLET: 875; 125 TABLET, FILM COATED ORAL at 21:30

## 2022-05-04 RX ADMIN — LIDOCAINE 5% 1 PATCH: 700 PATCH TOPICAL at 09:29

## 2022-05-04 RX ADMIN — TAMSULOSIN HYDROCHLORIDE 0.4 MG: 0.4 CAPSULE ORAL at 15:34

## 2022-05-04 RX ADMIN — ATORVASTATIN CALCIUM 40 MG: 40 TABLET, FILM COATED ORAL at 17:33

## 2022-05-04 RX ADMIN — ASPIRIN 81 MG CHEWABLE TABLET 81 MG: 81 TABLET CHEWABLE at 09:28

## 2022-05-04 RX ADMIN — Medication 250 MG: at 09:28

## 2022-05-04 RX ADMIN — TRAZODONE HYDROCHLORIDE 150 MG: 100 TABLET ORAL at 21:30

## 2022-05-04 RX ADMIN — AMOXICILLIN AND CLAVULANATE POTASSIUM 1 TABLET: 875; 125 TABLET, FILM COATED ORAL at 09:28

## 2022-05-04 RX ADMIN — NICOTINE 7 MG/24 HR DAILY TRANSDERMAL PATCH 1 PATCH: at 09:37

## 2022-05-04 RX ADMIN — BUPRENORPHINE AND NALOXONE 8 MG: 8; 2 FILM BUCCAL; SUBLINGUAL at 21:30

## 2022-05-04 RX ADMIN — BUPRENORPHINE AND NALOXONE 8 MG: 8; 2 FILM BUCCAL; SUBLINGUAL at 15:30

## 2022-05-04 RX ADMIN — Medication 250 MG: at 17:33

## 2022-05-04 RX ADMIN — HEPARIN SODIUM 5000 UNITS: 5000 INJECTION INTRAVENOUS; SUBCUTANEOUS at 21:30

## 2022-05-04 NOTE — WOUND OSTOMY CARE
Progress Note - Wound   Greyson Sly 63 y o  male MRN: [id number]  Unit/Bed#: -01 Encounter: [id number]           History and Present Illness:63year-old male from home via EMS with neighbors heard yelling, was found on floor incontinent of stool, confused  Records reviewed and has history of recent fall and left humerus fracture,patient poor historian unaware of events leading to fall  Wounds may evolve to a full thickness skin loss as well as unstageable, stage 3 stage 4 due to patients unknown down time and unwitnessed  fall at home  Assessment findings   1)Bilateral heels intact  2)POA wound to bilateral buttocks , left buttocks wound is evolving as stated on admission  100% yellow slough adhered to wound bed  Recommending change treatment to Santyl enzymatic debrider from Maxorb Ag  Right buttocks wound is healing with epithelial tissue noted  3)Right knee abrasion dry intact        Wound 04/25/22 Abrasion(s) Knee Anterior;Right (Active)   Wound Image   05/04/22 1056   Wound Description Dry; Intact; Brown 05/04/22 1056   Anila-wound Assessment Clean;Dry; Intact 05/03/22 2115   Wound Length (cm) 6 5 cm 05/04/22 1056   Wound Width (cm) 1 cm 05/04/22 1056   Wound Surface Area (cm^2) 6 5 cm^2 05/04/22 1056   Drainage Amount None 05/04/22 1056   Treatments Cleansed 05/04/22 1056   Dressing Open to air 05/04/22 1056   Patient Tolerance Tolerated well 05/04/22 1056       Wound 04/26/22 Pressure Injury Buttocks Right (Active)   Wound Image   05/04/22 1048   Wound Description Pink 05/04/22 1048   Pressure Injury Stage 2 05/04/22 1048   Anila-wound Assessment Erythema; Excoriated;Fragile 05/04/22 1048   Wound Length (cm) 2 cm 05/04/22 1048   Wound Width (cm) 4 cm 05/04/22 1048   Wound Surface Area (cm^2) 8 cm^2 05/04/22 1048   Drainage Amount None 05/04/22 1048   Drainage Description Foul smelling 05/03/22 0946   Non-staged Wound Description Partial thickness 04/30/22 2100   Treatments Cleansed;Site care 05/04/22 [address] gauze; Foam, Silicon (eg  Allevyn, etc) 05/04/22 1048   Dressing Changed New 05/04/22 1048   Patient Tolerance Tolerated well 05/04/22 1048   Dressing Status Clean;Dry; Intact; Old drainage 05/04/22 1048       Wound 04/26/22 Pressure Injury Buttocks Left (Active)   Wound Image   04/27/22 1114   Wound Description Epithelialization;Fragile;Pink 05/04/22 1100   Pressure Injury Stage 2 05/04/22 1100   Anila-wound Assessment Clean;Dry; Intact 05/04/22 1100   Wound Length (cm) 4 cm 05/04/22 1100   Wound Width (cm) 5 cm 05/04/22 1100   Wound Surface Area (cm^2) 20 cm^2 05/04/22 1100   Drainage Amount None 05/04/22 1100   Drainage Description Foul smelling 05/04/22 1044   Treatments Cleansed;Site care 05/04/22 1100   Dressing Dermagran gauze; Foam, Silicon (eg  Allevyn, etc) 05/04/22 1100   Dressing Changed Changed 05/04/22 1100   Patient Tolerance Tolerated well 05/04/22 1100   Dressing Status Clean;Dry; Intact 05/04/22 1100         Skin care plans:  1-Hydraguard to bilateral sacrum, buttock and heels BID and PRN  2-Elevate heels to offload pressure  3-Ehob cushion in chair when out of bed  4-Moisturize skin daily with skin nourishing cream   5-Turn/reposition q2h or when medically stable for pressure re-distribution on skin   Use foam wedges  6-Cleanse right buttocks wound with saline , apply Dermagran to wound then cover with Allevyn bordered foam  Change daily and prn soil or dislodgment  7-Cleanse left buttocks wound with saline, apply nickel thickness Santyl to wound bed, then cover with Allevyn borderd foam change daily and prn soil or dislodgment  Wound Care will follow weekly   Call or tigertext with any questions  Wound Care will continue to follow    Natasha Canada

## 2022-05-04 NOTE — CASE MANAGEMENT
Case Management Discharge Planning Note    Patient name Leah Mays  Location [address] 334/-14 MRN [id number]  : 1960 Date 2022       Current Admission Date: 2022  Current Admission Diagnosis:Toxic encephalopathy   Patient Active Problem List    Diagnosis Date Noted    Swelling of left upper extremity 2022    Goals of care, counseling/discussion 2022    Empty sella turcica ([facility] [address] ) 2022    Opioid dependence ([facility] [address] ) 2022    Sacral decubitus ulcer, stage III ([facility] [address] ) 2022    Back pain 2022    Toxic encephalopathy 2022    Closed fracture of multiple ribs of left side with routine healing 2022    Left humeral fracture 2022    Acute pain of left shoulder 2022    Closed 2-part displaced fracture of surgical neck of left humerus 2022    Other hyperlipidemia 2022    Memory loss 2022    Closed fracture of head of left humerus 2022    Class 1 obesity due to excess calories without serious comorbidity with body mass index (BMI) of 30 0 to 30 9 in adult 03/10/2016    Bipolar affective disorder ([facility] [address] ) 2013      LOS (days): 9  Geometric Mean LOS (GMLOS) (days): 4 20  Days to GMLOS:-4 9     OBJECTIVE:  Risk of Unplanned Readmission Score: 18         Current admission status: Inpatient   Preferred Pharmacy:   [facility] # [address] [address]  Phone: [phone] Fax: [phone]    Primary Care Provider: Beverly Skinner MD    Primary Insurance: MEDICARE  Secondary Insurance: [address]    DISCHARGE DETAILS:     CM Continues to search for placement  CM resent [facility] information to [facility] [address] facilities looking at patient        Cm placed additional referrals today on [facility]

## 2022-05-04 NOTE — DISCHARGE INSTR - OTHER ORDERS
Skin care plans:  1-Hydraguard to bilateralheels BID and PRN  2-Elevate heels to offload pressure  3-Ehob cushion in chair when out of bed  4-Moisturize skin daily with skin nourishing cream   5-Turn/reposition q2h or when medically stable for pressure re-distribution on skin  Use foam wedges   6-Cleanse right buttocks with saline, apply Dermagran wound then cover with Allevyn foam change daily and prn soil or dislodgment*  7-Cleanse left buttocks wound with normal saline,apply nickel thickness of Santyl to wound bed cover with Allevyn bordered foam and , change daily and prn soil or dislodgment  Wound care will follow weekly

## 2022-05-04 NOTE — ASSESSMENT & PLAN NOTE
-Patient is complaining of chronic back pain  With multiple surgeries   -Continue Suboxone as per home regimen starting tomorrow  Patient is seen by addiction clinic [facility]    Note reviewed   -He is supposed to be on 8 mg 1 film sublingual t i d

## 2022-05-04 NOTE — ASSESSMENT & PLAN NOTE
-Most likely secondary to Suboxone and Valium  -MRI of the brain also shows empty sella    -Mental status appears to have returned to baseline, will discuss with patient's family    -Resume home medications    -Neurology consult appreciated  -PT OT recommends to discharge patient on rehab-since patient is on Suboxone, which can affect the discharge planning and placement    Case management on board  - pt follow with Harriett Sanders

## 2022-05-04 NOTE — WOUND OSTOMY CARE
Consult Note - Wound   Juarez Kelley 63 y o  male MRN: [id number]  Unit/Bed#: -01 Encounter: [id number]             History and Present Illness:63year-old male from home via EMS with neighbors heard yelling, was found on floor incontinent of stool, confused  Records reviewed and has history of recent fall and left humerus fracture,patient poor historian unaware of events leading to fall  Seen today for initial wound consult and repeating conversation, confusion noted  Out of bed in recliner, able stand with minimal assist  Wearing adult brief for bladder management  Seen for weekly follow up  Wounds may evolve to a full thickness skin loss as well as unstageable, stage 3 stage 4 due to patients unknown down time and unwitnessed  fall at home        Assessment Findings:   1)Bilateral heels intact  2)POA wounds to bilateral buttocks, left buttocks wound is evolving as stated on admission, 100% yellow slough adhered to wound bed, recommending change from Maxorb Ag to Santyl enzymatic debrider due to slough  Right buttocks wound is healing with epithelial tissue noted, continuing with Dermagran to promoted healing   3)Right knee abrasion dry intact            Skin care plans:  1-Hydraguard to bilateralheels BID and PRN  2-Elevate heels to offload pressure  3-Ehob cushion in chair when out of bed  4-Moisturize skin daily with skin nourishing cream   5-Turn/reposition q2h or when medically stable for pressure re-distribution on skin  Use foam wedges   6-Cleanse right buttocks with saline, apply Dermagran wound then cover with Allevyn foam change daily and prn soil or dislodgment*  7-Cleanse left buttocks wound with normal saline,apply nickel thickness of Santyl to wound bed cover with Allevyn bordered foam and , change daily and prn soil or dislodgment  Wound care will follow weekly         Wound 04/25/22 Abrasion(s) Knee Anterior;Right (Active)   Wound Image   05/04/22 1056   Wound Description Dry;Intact; Brown 05/04/22 1056   Anila-wound Assessment Clean;Dry; Intact 05/03/22 2115   Wound Length (cm) 6 5 cm 05/04/22 1056   Wound Width (cm) 1 cm 05/04/22 1056   Wound Surface Area (cm^2) 6 5 cm^2 05/04/22 1056   Drainage Amount None 05/04/22 1056   Treatments Cleansed 05/04/22 1056   Dressing Open to air 05/04/22 1056   Patient Tolerance Tolerated well 05/04/22 1056       Wound 04/26/22 Pressure Injury Buttocks Right (Active)         Wound Description Pink 05/04/22 1048   Pressure Injury Stage 2 05/04/22 1048   Anila-wound Assessment Erythema; Excoriated;Fragile 05/04/22 1048   Wound Length (cm) 2 cm 05/04/22 1048   Wound Width (cm) 4 cm 05/04/22 1048   Wound Surface Area (cm^2) 8 cm^2 05/04/22 1048   Drainage Amount None 05/04/22 1048   Drainage Description Foul smelling 05/03/22 0946   Non-staged Wound Description Partial thickness 04/30/22 2100   Treatments Cleansed;Site care 05/04/22 1048   Dressing Dermagran gauze; Foam, Silicon (eg  Allevyn, etc) 05/04/22 1048   Dressing Changed New 05/04/22 1048   Patient Tolerance Tolerated well 05/04/22 1048   Dressing Status Clean;Dry; Intact; Old drainage 05/04/22 1048       Wound 04/26/22 Pressure Injury Buttocks Left (Active)   Wound Image   04/27/22 1114   Wound Description Epithelialization;Fragile;Pink 05/04/22 1100   Pressure Injury Stage 2 05/04/22 1100   Anila-wound Assessment Clean;Dry; Intact 05/04/22 1100   Wound Length (cm) 4 cm 05/04/22 1100   Wound Width (cm) 5 cm 05/04/22 1100   Wound Surface Area (cm^2) 20 cm^2 05/04/22 1100   Drainage Amount None 05/04/22 1100   Drainage Description Foul smelling 05/04/22 1044   Treatments Cleansed;Site care 05/04/22 1100   Dressing Dermagran gauze; Foam, Silicon (eg  Allevyn, etc) 05/04/22 1100   Dressing Changed Changed 05/04/22 1100   Patient Tolerance Tolerated well 05/04/22 1100   Dressing Status Clean;Dry; Intact 05/04/22 1100           Call or tigertext with any questions  Wound Care will continue to follow    Maria Del Carmen Dudley Ciaran Gabriel

## 2022-05-04 NOTE — PLAN OF CARE
Problem: MOBILITY - ADULT  Goal: Maintain or return to baseline ADL function  Description: INTERVENTIONS:  -  Assess patient's ability to carry out ADLs; assess patient's baseline for ADL function and identify physical deficits which impact ability to perform ADLs (bathing, care of mouth/teeth, toileting, grooming, dressing, etc )  - Assess/evaluate cause of self-care deficits   - Assess range of motion  - Assess patient's mobility; develop plan if impaired  - Assess patient's need for assistive devices and provide as appropriate  - Encourage maximum independence but intervene and supervise when necessary  - Involve family in performance of ADLs  - Assess for home care needs following discharge   - Consider OT consult to assist with ADL evaluation and planning for discharge  - Provide patient education as appropriate  Outcome: Progressing  Goal: Maintains/Returns to pre admission functional level  Description: INTERVENTIONS:  - Perform BMAT or MOVE assessment daily    - Set and communicate daily mobility goal to care team and patient/family/caregiver     - Collaborate with rehabilitation services on mobility goals if consulted  - Out of bed for meals 3 times a day  - Out of bed for toileting  - Record patient progress and toleration of activity level   Outcome: Progressing     Problem: PAIN - ADULT  Goal: Verbalizes/displays adequate comfort level or baseline comfort level  Description: Interventions:  - Encourage patient to monitor pain and request assistance  - Assess pain using appropriate pain scale  - Administer analgesics based on type and severity of pain and evaluate response  - Implement non-pharmacological measures as appropriate and evaluate response  - Consider cultural and social influences on pain and pain management  - Notify physician/advanced practitioner if interventions unsuccessful or patient reports new pain  Outcome: Progressing     Problem: INFECTION - ADULT  Goal: Absence or prevention of progression during hospitalization  Description: INTERVENTIONS:  - Assess and monitor for signs and symptoms of infection  - Monitor lab/diagnostic results  - Monitor all insertion sites, i e  indwelling lines, tubes, and drains  - Monitor endotracheal if appropriate and nasal secretions for changes in amount and color  - [place] appropriate cooling/warming therapies per order  - Administer medications as ordered  - Instruct and encourage patient and family to use good hand hygiene technique  - Identify and instruct in appropriate isolation precautions for identified infection/condition  Outcome: Progressing     Problem: DISCHARGE PLANNING  Goal: Discharge to home or other facility with appropriate resources  Description: INTERVENTIONS:  - Identify barriers to discharge w/patient and caregiver  - Arrange for needed discharge resources and transportation as appropriate  - Identify discharge learning needs (meds, wound care, etc )  - Arrange for interpretive services to assist at discharge as needed  - Refer to Case Management Department for coordinating discharge planning if the patient needs post-hospital services based on physician/advanced practitioner order or complex needs related to functional status, cognitive ability, or social support system  Outcome: Progressing     Problem: Knowledge Deficit  Goal: Patient/family/caregiver demonstrates understanding of disease process, treatment plan, medications, and discharge instructions  Description: Complete learning assessment and assess knowledge base    Interventions:  - Provide teaching at level of understanding  - Provide teaching via preferred learning methods  Outcome: Progressing     Problem: NEUROSENSORY - ADULT  Goal: Achieves stable or improved neurological status  Description: INTERVENTIONS  - Monitor and report changes in neurological status  - Monitor vital signs such as temperature, blood pressure, glucose, and any other labs ordered   - Initiate measures to prevent increased intracranial pressure  - Monitor for seizure activity and implement precautions if appropriate      Outcome: Progressing  Goal: Remains free of injury related to seizures activity  Description: INTERVENTIONS  - Maintain airway, patient safety  and administer oxygen as ordered  - Monitor patient for seizure activity, document and report duration and description of seizure to physician/advanced practitioner  - If seizure occurs,  ensure patient safety during seizure  - Reorient patient post seizure  - Seizure pads on all 4 side rails  - Instruct patient/family to notify RN of any seizure activity including if an aura is experienced  - Instruct patient/family to call for assistance with activity based on nursing assessment  - Administer anti-seizure medications if ordered    Outcome: Progressing  Goal: Achieves maximal functionality and self care  Description: INTERVENTIONS  - Monitor swallowing and airway patency with patient fatigue and changes in neurological status  - Encourage and assist patient to increase activity and self care     - Encourage visually impaired, hearing impaired and aphasic patients to use assistive/communication devices  Outcome: Progressing     Problem: MUSCULOSKELETAL - ADULT  Goal: Maintain or return mobility to safest level of function  Description: INTERVENTIONS:  - Assess patient's ability to carry out ADLs; assess patient's baseline for ADL function and identify physical deficits which impact ability to perform ADLs (bathing, care of mouth/teeth, toileting, grooming, dressing, etc )  - Assess/evaluate cause of self-care deficits   - Assess range of motion  - Assess patient's mobility  - Assess patient's need for assistive devices and provide as appropriate  - Encourage maximum independence but intervene and supervise when necessary  - Involve family in performance of ADLs  - Assess for home care needs following discharge   - Consider OT consult to assist with ADL evaluation and planning for discharge  - Provide patient education as appropriate  Outcome: Progressing  Goal: Maintain proper alignment of affected body part  Description: INTERVENTIONS:  - Support, maintain and protect limb and body alignment  - Provide patient/ family with appropriate education  Outcome: Progressing     Problem: Neurological Deficit  Goal: Neurological status is stable or improving  Description: Interventions:  - Monitor and assess patient's level of consciousness, motor function, sensory function, and level of assistance needed for ADLs  - Monitor and report changes from baseline  Collaborate with interdisciplinary team to initiate plan and implement interventions as ordered  - Provide and maintain a safe environment  - Consider seizure precautions  - Consider fall precautions  - Consider aspiration precautions  - Consider bleeding precautions  Outcome: Progressing     Problem: Activity Intolerance/Impaired Mobility  Goal: Mobility/activity is maintained at optimum level for patient  Description: Interventions:  - Assess and monitor patient  barriers to mobility and need for assistive/adaptive devices  - Assess patient's emotional response to limitations  - Collaborate with interdisciplinary team and initiate plans and interventions as ordered  - Encourage independent activity per ability   - Maintain proper body alignment  - Perform active/passive rom as tolerated/ordered  - Plan activities to conserve energy   - Turn patient as appropriate  Outcome: Progressing     Problem: Communication Impairment  Goal: Ability to express needs and understand communication  Description: Assess patient's communication skills and ability to understand information  Patient will demonstrate use of effective communication techniques, alternative methods of communication and understanding even if not able to speak       - Encourage communication and provide alternate methods of communication as needed  - Collaborate with case management/ for discharge needs  - Include patient/family/caregiver in decisions related to communication  Outcome: Progressing     Problem: Potential for Aspiration  Goal: Non-ventilated patient's risk of aspiration is minimized  Description: Assess and monitor vital signs, respiratory status, and labs (WBC)  Monitor for signs of aspiration (tachypnea, cough, rales, wheezing, cyanosis, fever)  - Assess and monitor patient's ability to swallow  - Place patient up in chair to eat if possible  - HOB up at 90 degrees to eat if unable to get patient up into chair   - Supervise patient during oral intake  - Instruct patient/ family to take small bites  - Instruct patient/ family to take small single sips when taking liquids  - Follow patient-specific strategies generated by speech pathologist   Outcome: Progressing     Problem: Nutrition  Goal: Nutrition/Hydration status is improving  Description: Monitor and assess patient's nutrition/hydration status for malnutrition (ex- brittle hair, bruises, dry skin, pale skin and conjunctiva, muscle wasting, smooth red tongue, and disorientation)  Collaborate with interdisciplinary team and initiate plan and interventions as ordered  Monitor patient's weight and dietary intake as ordered or per policy  Utilize nutrition screening tool and intervene per policy  Determine patient's food preferences and provide high-protein, high-caloric foods as appropriate  - Assist patient with eating   - Allow adequate time for meals   - Encourage patient to take dietary supplement as ordered  - Collaborate with clinical nutritionist   - Include patient/family/caregiver in decisions related to nutrition    Outcome: Progressing     Problem: Prexisting or High Potential for Compromised Skin Integrity  Goal: Skin integrity is maintained or improved  Description: INTERVENTIONS:  - Identify patients at risk for skin breakdown  - Assess and monitor skin integrity  - Assess and monitor nutrition and hydration status  - Monitor labs   - Assess for incontinence   - Turn and reposition patient  - Assist with mobility/ambulation  - Relieve pressure over bony prominences  - Avoid friction and shearing  - Provide appropriate hygiene as needed including keeping skin clean and dry  - Evaluate need for skin moisturizer/barrier cream  - Collaborate with interdisciplinary team   - Patient/family teaching  - Consider wound care consult   Outcome: Progressing     Problem: Potential for Falls  Goal: Patient will remain free of falls  Description: INTERVENTIONS:  - Educate patient/family on patient safety including physical limitations  - Instruct patient to call for assistance with activity   - Consult OT/PT to assist with strengthening/mobility   - Keep Call bell within reach  - Keep bed low and locked with side rails adjusted as appropriate  - Keep care items and personal belongings within reach  - Initiate and maintain comfort rounds  - Make Fall Risk Sign visible to staff  - Offer Toileting every 2 Hours, in advance of need  - Initiate/Maintain bed alarm  - Apply yellow socks and bracelet for high fall risk patients  - Consider moving patient to room near nurses station  Outcome: Progressing     Problem: Nutrition/Hydration-ADULT  Goal: Nutrient/Hydration intake appropriate for improving, restoring or maintaining nutritional needs  Description: Monitor and assess patient's nutrition/hydration status for malnutrition  Collaborate with interdisciplinary team and initiate plan and interventions as ordered  Monitor patient's weight and dietary intake as ordered or per policy  Utilize nutrition screening tool and intervene as necessary  Determine patient's food preferences and provide high-protein, high-caloric foods as appropriate       INTERVENTIONS:  - Monitor oral intake, urinary output, labs, and treatment plans  - Assess nutrition and hydration status and recommend course of action  - Evaluate amount of meals eaten  - Assist patient with eating if necessary   - Allow adequate time for meals  - Recommend/ encourage appropriate diets, oral nutritional supplements, and vitamin/mineral supplements  - Order, calculate, and assess calorie counts as needed  - Recommend, monitor, and adjust tube feedings and TPN/PPN based on assessed needs  - Assess need for intravenous fluids  - Provide specific nutrition/hydration education as appropriate  - Include patient/family/caregiver in decisions related to nutrition  Outcome: Progressing     Problem: SKIN/TISSUE INTEGRITY - ADULT  Goal: Incision(s), wounds(s) or drain site(s) healing without S/S of infection  Description: INTERVENTIONS  - Assess and document dressing, incision, wound bed, drain sites and surrounding tissue  - Provide patient and family education  - Perform skin care/dressing changes   Outcome: Progressing  Goal: Pressure injury heals and does not worsen  Description: Interventions:  - Implement low air loss mattress or specialty surface (Criteria met)  - Apply silicone foam dressing  - Apply fecal or urinary incontinence containment device   - Turn and reposition patient & offload bony prominences every 2 hours   - Utilize friction reducing device or surface for transfers   - Consider nutrition services referral as needed  Outcome: Progressing

## 2022-05-04 NOTE — ASSESSMENT & PLAN NOTE
-chronic back pain, evidenced by daily use of Suboxone 8mg three times a day, requiring continued regime in hospital      -PT OT recommends rehab placement, but since patient is on Suboxone, unable to find place around this area-case management on board,

## 2022-05-04 NOTE — ASSESSMENT & PLAN NOTE
-Discussion has done informed of patient's, with patient's son, and case management   -As per neuropsych, patient is incapable to make informed decision

## 2022-05-04 NOTE — ASSESSMENT & PLAN NOTE
-Continue home regimen of Valium, Cymbalta and Latuda  -Psychiatry consult appreciated  - 5/4- reports "being depressed as hell" because of his numerous back surgeries causing chronic pain and effecting his life, and lifestyle, and abilities to do thing, and be active  Has had neuropsychiatric consult who evaluated   Denies SI/HI

## 2022-05-04 NOTE — ASSESSMENT & PLAN NOTE
-Incidental finding  -TSH normal, does not have any visual change  No electrolyte imbalance    No signs of symptoms of acromegaly,  -Patient will need outpatient follow-up with Neurology and Ophthalmology-as per Neurology recommendation

## 2022-05-04 NOTE — ASSESSMENT & PLAN NOTE
-Unknown etiology  -Patient does have history of left humeral fracture-and left upper extremity on triangular sling for immobilization  venous duplex of left upper extremity negative  -DC IV fluid

## 2022-05-04 NOTE — ASSESSMENT & PLAN NOTE
-CT chest and pelvis shows There are fractures of the left 6, 7th, 8th, and 9th ribs with reactive bone formation present suggesting that these fractures are subacute or less likely chronic   -Continue pain control    These do not appear to be acute fractures and hence continue supportive care  -PT OT recommends rehab placement, but since patient is on Suboxone, unable to find place around this area-case management on board,  -patient adamant about going home with services, was evaluated by neuropsychiatry 4/28 in deemed not competent in making medical decisions at this point

## 2022-05-04 NOTE — PROGRESS NOTES
[address]  Progress Note - Rob White 1960, 63 y o  male MRN: [id number]  Unit/Bed#: [facility] Encounter: [id number]  Primary Care Provider: Kaylee Quintero MD   Date and time admitted to hospital: 4/25/2022 10:18 AM    * Toxic encephalopathy  Assessment & Plan  -Most likely secondary to Suboxone and Valium  -MRI of the brain also shows empty sella    -Mental status appears to have returned to baseline, will discuss with patient's family    -Resume home medications    -Neurology consult appreciated  -PT OT recommends to discharge patient on rehab-since patient is on Suboxone, which can affect the discharge planning and placement  Case management on board  - pt follow with Kelly Enriquez Smaller      Swelling of left upper extremity  Assessment & Plan  -Unknown etiology  -Patient does have history of left humeral fracture-and left upper extremity on triangular sling for immobilization  venous duplex of left upper extremity negative  -DC IV fluid    Empty sella turcica ([address] )  Assessment & Plan  -Incidental finding  -TSH normal, does not have any visual change  No electrolyte imbalance  No signs of symptoms of acromegaly,  -Patient will need outpatient follow-up with Neurology and Ophthalmology-as per Neurology recommendation    Goals of care, counseling/discussion  Assessment & Plan  -Discussion has done informed of patient's, with patient's son, and case management   -As per neuropsych, patient is incapable to make informed decision  Sacral decubitus ulcer, stage III (HCC)  Assessment & Plan  -Continue wound care  -Follow wound culture  -Wound culture shows E coli, Gram-positive rods, Gram-positive cocci-sensitive to Augmentin, started Augmentin for 7 days   Through 5/6  -wound care evaluation- addition of santyl daily left buttock, to replace maxorb Ag  - continue Hydrea carotid to bilateral sacrum, buttock and bilateral heels  - Q2 turns, offload heels, air cushion when out of bed    Opioid dependence (HCC)  Assessment & Plan  -chronic back pain, evidenced by daily use of Suboxone 8mg three times a day, requiring continued regime in hospital      -PT OT recommends rehab placement, but since patient is on Suboxone, unable to find place around this area-case management on board,      Left humeral fracture  Assessment & Plan  Patient had left humeral fracture diagnosed on 04/04/2022 on an ED visit  Still present on imaging  · Orthopedic recommendation appreciated:Patient may not lift more than 1 lb with the left upper extremity  · PT/OT pendulum exercises, active assisted range of motion, below shoulder height left upper extremity  PT OT recommends rehab placement, but since patient is on Suboxone, unable to find place around this area-case management on board,      Closed fracture of multiple ribs of left side with routine healing  Assessment & Plan  -CT chest and pelvis shows There are fractures of the left 6, 7th, 8th, and 9th ribs with reactive bone formation present suggesting that these fractures are subacute or less likely chronic   -Continue pain control  These do not appear to be acute fractures and hence continue supportive care  -PT OT recommends rehab placement, but since patient is on Suboxone, unable to find place around this area-case management on board,  -patient adamant about going home with services, was evaluated by neuropsychiatry 4/28 in deemed not competent in making medical decisions at this point    Back pain  Assessment & Plan  -Patient is complaining of chronic back pain  With multiple surgeries   -Continue Suboxone as per home regimen starting tomorrow  Patient is seen by addiction clinic [facility]    Note reviewed   -He is supposed to be on 8 mg 1 film sublingual t i d     Other hyperlipidemia  Assessment & Plan  -Continue statin therapy    Bipolar affective disorder ([facility] [address] )  Assessment & Plan  -Continue home regimen of Valium, Cymbalta and [facility]  -Psychiatry consult appreciated  - - reports "being depressed as hell" because of his numerous back surgeries causing chronic pain and effecting his life, and lifestyle, and abilities to do thing, and be active  Has had neuropsychiatric consult who evaluated  Denies SI/HI          VTE Pharmacologic Prophylaxis: VTE Score: 2 Moderate Risk (Score 3-4) - Pharmacological DVT Prophylaxis Ordered: heparin  Patient Centered Rounds: I performed bedside rounds with nursing staff today  Discussions with Specialists or Other Care Team Provider: case management, Dr Trudy Burkitt    Education and Discussions with Family / Patient: Attempted to update  (son) via phone  Unable to contact  Goes directly to voicemail    Time Spent for Care: 45 minutes  More than 50% of total time spent on counseling and coordination of care as described above  Current Length of Stay: 9 day(s)  Current Patient Status: Inpatient   Certification Statement: The patient will continue to require additional inpatient hospital stay due to pending placement with ability to administer suboxone  Discharge Plan: Anticipate discharge in 24-48 hrs to rehab facility  Code Status: Level 1 - Full Code    Subjective:   Patient wanting to go home with home healthcare services however discussed neuropsych evaluation with patient and inability to make appropriate decisions at this point  Patient reports trying to contact son numerous times unable to reach him  I additionally tried to update patient's son the call went directly to voicemail  Discussed concerns with safety patient returning home  No events overnight reported by nursing  Patient reports chronic back pain overall feeling "blah" today      Objective:     Vitals:   Temp (24hrs), Av 2 °F (36 8 °C), Min:98 1 °F (36 7 °C), Max:98 4 °F (36 9 °C)    Temp:  [98 1 °F (36 7 °C)-98 4 °F (36 9 °C)] 98 4 °F (36 9 °C)  HR:  [79-82] 80  Resp:  [18-20] 18  BP: (114-122)/(70-72) 122/71  SpO2:  [95 %-96 %] 96 %  Body mass index is 26 01 kg/m²  Input and Output Summary (last 24 hours): Intake/Output Summary (Last 24 hours) at 5/4/2022 1625  Last data filed at 5/4/2022 1412  Gross per 24 hour   Intake 462 ml   Output 3550 ml   Net -3088 ml       Physical Exam:   Physical Exam  Vitals and nursing note reviewed  Constitutional:       Appearance: He is well-developed  HENT:      Head: Normocephalic and atraumatic  Mouth/Throat:      Mouth: Mucous membranes are moist    Eyes:      General: No scleral icterus  Extraocular Movements: Extraocular movements intact  Conjunctiva/sclera: Conjunctivae normal       Pupils: Pupils are equal, round, and reactive to light  Cardiovascular:      Rate and Rhythm: Normal rate and regular rhythm  Heart sounds: No murmur heard  Pulmonary:      Effort: Pulmonary effort is normal  No respiratory distress  Breath sounds: Normal breath sounds  No wheezing or rales  Abdominal:      General: Bowel sounds are normal  There is no distension  Palpations: Abdomen is soft  Tenderness: There is no abdominal tenderness  There is no guarding  Musculoskeletal:      Cervical back: Neck supple  Right lower leg: Edema (trace) present  Left lower leg: Edema (trace) present  Skin:     General: Skin is warm and dry  Capillary Refill: Capillary refill takes less than 2 seconds  Neurological:      Mental Status: He is alert  Motor: No weakness or tremor  Psychiatric:         Mood and Affect: Mood is anxious  Speech: Speech is rapid and pressured and tangential          Behavior: Behavior is cooperative            Additional Data:     Labs:  Results from last 7 days   Lab Units 05/04/22  0443   WBC Thousand/uL 4 59   HEMOGLOBIN g/dL 8 7*   HEMATOCRIT % 27 5*   PLATELETS Thousands/uL 342   NEUTROS PCT % 59   LYMPHS PCT % 23   MONOS PCT % 10   EOS PCT % 8*     Results from last 7 days   Lab Units 05/04/22  0443 05/03/22  0538 05/03/22  0538   SODIUM mmol/L 140   < > 141   POTASSIUM mmol/L 3 9   < > 3 7   CHLORIDE mmol/L 106   < > 106   CO2 mmol/L 32   < > 30   BUN mg/dL 6   < > 6   CREATININE mg/dL 0 63   < > 0 64   ANION GAP mmol/L 2*   < > 5   CALCIUM mg/dL 7 8*   < > 7 7*   ALBUMIN g/dL 2 4*   < > 2 5*   TOTAL BILIRUBIN mg/dL  --   --  0 22   ALK PHOS U/L  --   --  99   ALT U/L  --   --  10*   AST U/L  --   --  18   GLUCOSE RANDOM mg/dL 104   < > 101    < > = values in this interval not displayed  Results from last 7 days   Lab Units 05/03/22  0633   INR  0 97             Results from last 7 days   Lab Units 05/03/22  0538   PROCALCITONIN ng/ml <0 05       Lines/Drains:  Invasive Devices  Report    Peripheral Intravenous Line            Peripheral IV 05/01/22 Dorsal (posterior); Right Hand 3 days                      Imaging: Reviewed radiology reports from this admission including: MRI brain and xray(s)    Recent Cultures (last 7 days):         Last 24 Hours Medication List:   Current Facility-Administered Medications   Medication Dose Route Frequency Provider Last Rate    acetaminophen  650 mg Oral Q6H PRN Sonia Greene MD      amoxicillin-clavulanate  1 tablet Oral Q12H [facility] Kevin Grajeda MD      aspirin  81 mg Oral Daily Sonia Greene MD      atorvastatin  40 mg Oral QPM Sonia Greene MD      buprenorphine-naloxone  8 mg Sublingual TID Sonia Greene MD      calcium carbonate  1,000 mg Oral Daily PRN MD Joyce Calvert [START ON 5/5/2022] collagenase   Topical Daily Nongalileo Lara, NINI      diazepam  5 mg Oral Q8H PRN Sonia Greene MD      DULoxetine  120 mg Oral Daily Sonia Greene MD      heparin (porcine)  5,000 Units Subcutaneous Q8H [facility] Kevin Grajeda MD      ibuprofen  600 mg Oral Q6H PRN Kevin Grajeda MD      lidocaine  1 patch Topical Daily Kevin Grajeda MD      lurasidone  20 mg Oral HS Sonia Greene MD      nicotine  1 patch Transdermal Daily MD Joyce Calvert ondansetron  4 mg Intravenous Q6H PRN Naila Grant MD      saccharomyces boulardii  250 mg Oral BID Raghavendra Marshall MD      tamsulosin  0 4 mg Oral Daily With Mariya Callahan MD      traZODone  150 mg Oral HS Naila Grant MD          Today, Patient Was Seen By: NINI Cortés    **Please Note: This note may have been constructed using a voice recognition system  **

## 2022-05-04 NOTE — ASSESSMENT & PLAN NOTE
-Continue wound care  -Follow wound culture  -Wound culture shows E coli, Gram-positive rods, Gram-positive cocci-sensitive to Augmentin, started Augmentin for 7 days   Through 5/6  -wound care evaluation- addition of santyl daily left buttock, to replace maxorb Ag  - continue Hydrea carotid to bilateral sacrum, buttock and bilateral heels  - Q2 turns, offload heels, air cushion when out of bed

## 2022-05-05 LAB
ANION GAP SERPL CALCULATED.3IONS-SCNC: 4 MMOL/L (ref 4–13)
BUN SERPL-MCNC: 7 MG/DL (ref 5–25)
CALCIUM SERPL-MCNC: 8.7 MG/DL (ref 8.3–10.1)
CHLORIDE SERPL-SCNC: 102 MMOL/L (ref 100–108)
CO2 SERPL-SCNC: 32 MMOL/L (ref 21–32)
CREAT SERPL-MCNC: 0.73 MG/DL (ref 0.6–1.3)
ERYTHROCYTE [DISTWIDTH] IN BLOOD BY AUTOMATED COUNT: 15 % (ref 11.6–15.1)
GFR SERPL CREATININE-BSD FRML MDRD: 100 ML/MIN/1.73SQ M
GLUCOSE SERPL-MCNC: 111 MG/DL (ref 65–140)
HCT VFR BLD AUTO: 33.4 % (ref 36.5–49.3)
HGB BLD-MCNC: 10.9 G/DL (ref 12–17)
MCH RBC QN AUTO: 34.7 PG (ref 26.8–34.3)
MCHC RBC AUTO-ENTMCNC: 32.6 G/DL (ref 31.4–37.4)
MCV RBC AUTO: 106 FL (ref 82–98)
PLATELET # BLD AUTO: 405 THOUSANDS/UL (ref 149–390)
PMV BLD AUTO: 9.1 FL (ref 8.9–12.7)
POTASSIUM SERPL-SCNC: 4 MMOL/L (ref 3.5–5.3)
RBC # BLD AUTO: 3.14 MILLION/UL (ref 3.88–5.62)
SODIUM SERPL-SCNC: 138 MMOL/L (ref 136–145)
WBC # BLD AUTO: 6.92 THOUSAND/UL (ref 4.31–10.16)

## 2022-05-05 PROCEDURE — 85027 COMPLETE CBC AUTOMATED: CPT

## 2022-05-05 PROCEDURE — 80048 BASIC METABOLIC PNL TOTAL CA: CPT

## 2022-05-05 PROCEDURE — 99232 SBSQ HOSP IP/OBS MODERATE 35: CPT

## 2022-05-05 RX ADMIN — ATORVASTATIN CALCIUM 40 MG: 40 TABLET, FILM COATED ORAL at 18:33

## 2022-05-05 RX ADMIN — HEPARIN SODIUM 5000 UNITS: 5000 INJECTION INTRAVENOUS; SUBCUTANEOUS at 21:37

## 2022-05-05 RX ADMIN — COLLAGENASE SANTYL: 250 OINTMENT TOPICAL at 09:37

## 2022-05-05 RX ADMIN — ASPIRIN 81 MG CHEWABLE TABLET 81 MG: 81 TABLET CHEWABLE at 08:54

## 2022-05-05 RX ADMIN — DULOXETINE HYDROCHLORIDE 120 MG: 60 CAPSULE, DELAYED RELEASE ORAL at 08:53

## 2022-05-05 RX ADMIN — BUPRENORPHINE AND NALOXONE 8 MG: 8; 2 FILM BUCCAL; SUBLINGUAL at 08:53

## 2022-05-05 RX ADMIN — AMOXICILLIN AND CLAVULANATE POTASSIUM 1 TABLET: 875; 125 TABLET, FILM COATED ORAL at 21:37

## 2022-05-05 RX ADMIN — TRAZODONE HYDROCHLORIDE 150 MG: 100 TABLET ORAL at 21:37

## 2022-05-05 RX ADMIN — AMOXICILLIN AND CLAVULANATE POTASSIUM 1 TABLET: 875; 125 TABLET, FILM COATED ORAL at 08:54

## 2022-05-05 RX ADMIN — LIDOCAINE 5% 1 PATCH: 700 PATCH TOPICAL at 09:30

## 2022-05-05 RX ADMIN — Medication 250 MG: at 08:53

## 2022-05-05 RX ADMIN — HEPARIN SODIUM 5000 UNITS: 5000 INJECTION INTRAVENOUS; SUBCUTANEOUS at 13:00

## 2022-05-05 RX ADMIN — HEPARIN SODIUM 5000 UNITS: 5000 INJECTION INTRAVENOUS; SUBCUTANEOUS at 05:31

## 2022-05-05 RX ADMIN — BUPRENORPHINE AND NALOXONE 8 MG: 8; 2 FILM BUCCAL; SUBLINGUAL at 16:50

## 2022-05-05 RX ADMIN — Medication 250 MG: at 18:33

## 2022-05-05 RX ADMIN — DIAZEPAM 5 MG: 5 TABLET ORAL at 19:23

## 2022-05-05 RX ADMIN — LURASIDONE HYDROCHLORIDE 20 MG: 20 TABLET, FILM COATED ORAL at 21:43

## 2022-05-05 RX ADMIN — TAMSULOSIN HYDROCHLORIDE 0.4 MG: 0.4 CAPSULE ORAL at 18:33

## 2022-05-05 RX ADMIN — DIAZEPAM 5 MG: 5 TABLET ORAL at 07:41

## 2022-05-05 RX ADMIN — NICOTINE 7 MG/24 HR DAILY TRANSDERMAL PATCH 1 PATCH: at 08:54

## 2022-05-05 NOTE — ASSESSMENT & PLAN NOTE
-Patient is complaining of chronic back pain  With multiple surgeries   -Continue Suboxone as per home regimen starting tomorrow  [facility]    Note reviewed   -He is supposed to be on 8 mg 1 film sublingual t i d

## 2022-05-05 NOTE — PLAN OF CARE
Problem: MOBILITY - ADULT  Goal: Maintain or return to baseline ADL function  Description: INTERVENTIONS:  -  Assess patient's ability to carry out ADLs; assess patient's baseline for ADL function and identify physical deficits which impact ability to perform ADLs (bathing, care of mouth/teeth, toileting, grooming, dressing, etc )  - Assess/evaluate cause of self-care deficits   - Assess range of motion  - Assess patient's mobility; develop plan if impaired  - Assess patient's need for assistive devices and provide as appropriate  - Encourage maximum independence but intervene and supervise when necessary  - Involve family in performance of ADLs  - Assess for home care needs following discharge   - Consider OT consult to assist with ADL evaluation and planning for discharge  - Provide patient education as appropriate  Outcome: Progressing  Goal: Maintains/Returns to pre admission functional level  Description: INTERVENTIONS:  - Perform BMAT or MOVE assessment daily    - Set and communicate daily mobility goal to care team and patient/family/caregiver     - Collaborate with rehabilitation services on mobility goals if consulted  - Out of bed for meals 3 times a day  - Out of bed for toileting  - Record patient progress and toleration of activity level   Outcome: Progressing     Problem: PAIN - ADULT  Goal: Verbalizes/displays adequate comfort level or baseline comfort level  Description: Interventions:  - Encourage patient to monitor pain and request assistance  - Assess pain using appropriate pain scale  - Administer analgesics based on type and severity of pain and evaluate response  - Implement non-pharmacological measures as appropriate and evaluate response  - Consider cultural and social influences on pain and pain management  - Notify physician/advanced practitioner if interventions unsuccessful or patient reports new pain  Outcome: Progressing     Problem: INFECTION - ADULT  Goal: Absence or prevention of progression during hospitalization  Description: INTERVENTIONS:  - Assess and monitor for signs and symptoms of infection  - Monitor lab/diagnostic results  - Monitor all insertion sites, i e  indwelling lines, tubes, and drains  - Monitor endotracheal if appropriate and nasal secretions for changes in amount and color  - [place] appropriate cooling/warming therapies per order  - Administer medications as ordered  - Instruct and encourage patient and family to use good hand hygiene technique  - Identify and instruct in appropriate isolation precautions for identified infection/condition  Outcome: Progressing     Problem: DISCHARGE PLANNING  Goal: Discharge to home or other facility with appropriate resources  Description: INTERVENTIONS:  - Identify barriers to discharge w/patient and caregiver  - Arrange for needed discharge resources and transportation as appropriate  - Identify discharge learning needs (meds, wound care, etc )  - Arrange for interpretive services to assist at discharge as needed  - Refer to Case Management Department for coordinating discharge planning if the patient needs post-hospital services based on physician/advanced practitioner order or complex needs related to functional status, cognitive ability, or social support system  Outcome: Progressing     Problem: Knowledge Deficit  Goal: Patient/family/caregiver demonstrates understanding of disease process, treatment plan, medications, and discharge instructions  Description: Complete learning assessment and assess knowledge base    Interventions:  - Provide teaching at level of understanding  - Provide teaching via preferred learning methods  Outcome: Progressing     Problem: NEUROSENSORY - ADULT  Goal: Achieves stable or improved neurological status  Description: INTERVENTIONS  - Monitor and report changes in neurological status  - Monitor vital signs such as temperature, blood pressure, glucose, and any other labs ordered   - Initiate measures to prevent increased intracranial pressure  - Monitor for seizure activity and implement precautions if appropriate      Outcome: Progressing  Goal: Remains free of injury related to seizures activity  Description: INTERVENTIONS  - Maintain airway, patient safety  and administer oxygen as ordered  - Monitor patient for seizure activity, document and report duration and description of seizure to physician/advanced practitioner  - If seizure occurs,  ensure patient safety during seizure  - Reorient patient post seizure  - Seizure pads on all 4 side rails  - Instruct patient/family to notify RN of any seizure activity including if an aura is experienced  - Instruct patient/family to call for assistance with activity based on nursing assessment  - Administer anti-seizure medications if ordered    Outcome: Progressing  Goal: Achieves maximal functionality and self care  Description: INTERVENTIONS  - Monitor swallowing and airway patency with patient fatigue and changes in neurological status  - Encourage and assist patient to increase activity and self care     - Encourage visually impaired, hearing impaired and aphasic patients to use assistive/communication devices  Outcome: Progressing     Problem: MUSCULOSKELETAL - ADULT  Goal: Maintain or return mobility to safest level of function  Description: INTERVENTIONS:  - Assess patient's ability to carry out ADLs; assess patient's baseline for ADL function and identify physical deficits which impact ability to perform ADLs (bathing, care of mouth/teeth, toileting, grooming, dressing, etc )  - Assess/evaluate cause of self-care deficits   - Assess range of motion  - Assess patient's mobility  - Assess patient's need for assistive devices and provide as appropriate  - Encourage maximum independence but intervene and supervise when necessary  - Involve family in performance of ADLs  - Assess for home care needs following discharge   - Consider OT consult to assist with ADL evaluation and planning for discharge  - Provide patient education as appropriate  Outcome: Progressing  Goal: Maintain proper alignment of affected body part  Description: INTERVENTIONS:  - Support, maintain and protect limb and body alignment  - Provide patient/ family with appropriate education  Outcome: Progressing     Problem: Neurological Deficit  Goal: Neurological status is stable or improving  Description: Interventions:  - Monitor and assess patient's level of consciousness, motor function, sensory function, and level of assistance needed for ADLs  - Monitor and report changes from baseline  Collaborate with interdisciplinary team to initiate plan and implement interventions as ordered  - Provide and maintain a safe environment  - Consider seizure precautions  - Consider fall precautions  - Consider aspiration precautions  - Consider bleeding precautions  Outcome: Progressing     Problem: Activity Intolerance/Impaired Mobility  Goal: Mobility/activity is maintained at optimum level for patient  Description: Interventions:  - Assess and monitor patient  barriers to mobility and need for assistive/adaptive devices  - Assess patient's emotional response to limitations  - Collaborate with interdisciplinary team and initiate plans and interventions as ordered  - Encourage independent activity per ability   - Maintain proper body alignment  - Perform active/passive rom as tolerated/ordered  - Plan activities to conserve energy   - Turn patient as appropriate  Outcome: Progressing     Problem: Communication Impairment  Goal: Ability to express needs and understand communication  Description: Assess patient's communication skills and ability to understand information  Patient will demonstrate use of effective communication techniques, alternative methods of communication and understanding even if not able to speak       - Encourage communication and provide alternate methods of communication as needed  - Collaborate with case management/ for discharge needs  - Include patient/family/caregiver in decisions related to communication  Outcome: Progressing     Problem: Potential for Aspiration  Goal: Non-ventilated patient's risk of aspiration is minimized  Description: Assess and monitor vital signs, respiratory status, and labs (WBC)  Monitor for signs of aspiration (tachypnea, cough, rales, wheezing, cyanosis, fever)  - Assess and monitor patient's ability to swallow  - Place patient up in chair to eat if possible  - HOB up at 90 degrees to eat if unable to get patient up into chair   - Supervise patient during oral intake  - Instruct patient/ family to take small bites  - Instruct patient/ family to take small single sips when taking liquids  - Follow patient-specific strategies generated by speech pathologist   Outcome: Progressing     Problem: Nutrition  Goal: Nutrition/Hydration status is improving  Description: Monitor and assess patient's nutrition/hydration status for malnutrition (ex- brittle hair, bruises, dry skin, pale skin and conjunctiva, muscle wasting, smooth red tongue, and disorientation)  Collaborate with interdisciplinary team and initiate plan and interventions as ordered  Monitor patient's weight and dietary intake as ordered or per policy  Utilize nutrition screening tool and intervene per policy  Determine patient's food preferences and provide high-protein, high-caloric foods as appropriate  - Assist patient with eating   - Allow adequate time for meals   - Encourage patient to take dietary supplement as ordered  - Collaborate with clinical nutritionist   - Include patient/family/caregiver in decisions related to nutrition    Outcome: Progressing     Problem: Prexisting or High Potential for Compromised Skin Integrity  Goal: Skin integrity is maintained or improved  Description: INTERVENTIONS:  - Identify patients at risk for skin breakdown  - Assess and monitor skin integrity  - Assess and monitor nutrition and hydration status  - Monitor labs   - Assess for incontinence   - Turn and reposition patient  - Assist with mobility/ambulation  - Relieve pressure over bony prominences  - Avoid friction and shearing  - Provide appropriate hygiene as needed including keeping skin clean and dry  - Evaluate need for skin moisturizer/barrier cream  - Collaborate with interdisciplinary team   - Patient/family teaching  - Consider wound care consult   Outcome: Progressing     Problem: Potential for Falls  Goal: Patient will remain free of falls  Description: INTERVENTIONS:  - Educate patient/family on patient safety including physical limitations  - Instruct patient to call for assistance with activity   - Consult OT/PT to assist with strengthening/mobility   - Keep Call bell within reach  - Keep bed low and locked with side rails adjusted as appropriate  - Keep care items and personal belongings within reach  - Initiate and maintain comfort rounds  - Make Fall Risk Sign visible to staff  - Offer Toileting every 2 Hours, in advance of need  - Initiate/Maintain bed and chair alarm  - Obtain necessary fall risk management equipment:   - Apply yellow socks and bracelet for high fall risk patients  - Consider moving patient to room near nurses station  Outcome: Progressing     Problem: Nutrition/Hydration-ADULT  Goal: Nutrient/Hydration intake appropriate for improving, restoring or maintaining nutritional needs  Description: Monitor and assess patient's nutrition/hydration status for malnutrition  Collaborate with interdisciplinary team and initiate plan and interventions as ordered  Monitor patient's weight and dietary intake as ordered or per policy  Utilize nutrition screening tool and intervene as necessary  Determine patient's food preferences and provide high-protein, high-caloric foods as appropriate       INTERVENTIONS:  - Monitor oral intake, urinary output, labs, and treatment plans  - Assess nutrition and hydration status and recommend course of action  - Evaluate amount of meals eaten  - Assist patient with eating if necessary   - Allow adequate time for meals  - Recommend/ encourage appropriate diets, oral nutritional supplements, and vitamin/mineral supplements  - Order, calculate, and assess calorie counts as needed  - Recommend, monitor, and adjust tube feedings and TPN/PPN based on assessed needs  - Assess need for intravenous fluids  - Provide specific nutrition/hydration education as appropriate  - Include patient/family/caregiver in decisions related to nutrition  Outcome: Progressing     Problem: SKIN/TISSUE INTEGRITY - ADULT  Goal: Incision(s), wounds(s) or drain site(s) healing without S/S of infection  Description: INTERVENTIONS  - Assess and document dressing, incision, wound bed, drain sites and surrounding tissue  - Provide patient and family education  - Perform skin care/dressing changes   Outcome: Progressing  Goal: Pressure injury heals and does not worsen  Description: Interventions:  - Implement low air loss mattress or specialty surface (Criteria met)  - Apply silicone foam dressing  - Instruct/assist with weight shifting every 30 minutes when in chair   - Limit chair time to 2 hour intervals  - Use special pressure reducing interventions such as  when in chair   - Apply fecal or urinary incontinence containment device   - Perform passive or active ROM every   - Turn and reposition patient & offload bony prominences every 2 hours   - Utilize friction reducing device or surface for transfers   - Consider consults to  interdisciplinary teams such as   - Use incontinent care products after each incontinent episode such as   - Consider nutrition services referral as needed  Outcome: Progressing

## 2022-05-05 NOTE — ASSESSMENT & PLAN NOTE
-Most likely secondary to Suboxone and Valium  -MRI of the brain also shows empty sella    -Mental status appears to have returned to baseline, will discuss with patient's family    -Resume home medications    -Neurology consult appreciated  -PT OT recommends to discharge patient on rehab-since patient is on Suboxone, which can affect the discharge planning and placement    Case management on board  - pt follow with Veronique Aj

## 2022-05-05 NOTE — PROGRESS NOTES
[address]  Progress Note - Sri Bernabe 1960, 63 y o  male MRN: [id number]  Unit/Bed#: -[facility] Encounter: [id number]  Primary Care Provider: Lizet Bui MD   Date and time admitted to hospital: 4/25/2022 10:18 AM    * Toxic encephalopathy  Assessment & Plan  -Most likely secondary to Suboxone and Valium  -MRI of the brain also shows empty sella    -Mental status appears to have returned to baseline, will discuss with patient's family    -Resume home medications    -Neurology consult appreciated  -PT OT recommends to discharge patient on rehab-since patient is on Suboxone, which can affect the discharge planning and placement  Case management on board  - pt follow with Sima Teague      Swelling of left upper extremity  Assessment & Plan  -Unknown etiology  -Patient does have history of left humeral fracture-and left upper extremity on triangular sling for immobilization  venous duplex of left upper extremity negative  -DC IV fluid    Empty sella turcica ([address] )  Assessment & Plan  -Incidental finding  -TSH normal, does not have any visual change  No electrolyte imbalance  No signs of symptoms of acromegaly,  -Patient will need outpatient follow-up with Neurology and Ophthalmology-as per Neurology recommendation    Goals of care, counseling/discussion  Assessment & Plan  -Discussion has done informed of patient's, with patient's son, and case management   -As per neuropsych, patient is incapable to make informed decision  Sacral decubitus ulcer, stage III (HCC)  Assessment & Plan  -Continue wound care  -Follow wound culture  -Wound culture shows E coli, Gram-positive rods, Gram-positive cocci-sensitive to Augmentin, started Augmentin for 7 days   Through 5/6  -wound care evaluation- addition of santyl daily left buttock, to replace maxorb Ag  - continue Hydrea carotid to bilateral sacrum, buttock and bilateral heels  - Q2 turns, offload heels, air cushion when out of bed    Opioid dependence (HCC)  Assessment & Plan  -chronic back pain, evidenced by daily use of Suboxone 8mg three times a day, requiring continued regime in hospital      -PT OT recommends rehab placement, but since patient is on Suboxone, unable to find place around this area-case management on board,      Left humeral fracture  Assessment & Plan  Patient had left humeral fracture diagnosed on 04/04/2022 on an ED visit  Still present on imaging  · Orthopedic recommendation appreciated:Patient may not lift more than 1 lb with the left upper extremity  · PT/OT pendulum exercises, active assisted range of motion, below shoulder height left upper extremity  PT OT recommends rehab placement, but since patient is on Suboxone, unable to find place around this area-case management on board,      Closed fracture of multiple ribs of left side with routine healing  Assessment & Plan  -CT chest and pelvis shows There are fractures of the left 6, 7th, 8th, and 9th ribs with reactive bone formation present suggesting that these fractures are subacute or less likely chronic   -Continue pain control  These do not appear to be acute fractures and hence continue supportive care  -PT OT recommends rehab placement, but since patient is on Suboxone, unable to find place around this area-case management on board,  -patient adamant about going home with services, was evaluated by neuropsychiatry 4/28 in deemed not competent in making medical decisions at this point    Back pain  Assessment & Plan  -Patient is complaining of chronic back pain  With multiple surgeries   -Continue Suboxone as per home regimen starting tomorrow  Patient is seen by addiction clinic [facility]    Note reviewed   -He is supposed to be on 8 mg 1 film sublingual t i d     Other hyperlipidemia  Assessment & Plan  -Continue statin therapy    Bipolar affective disorder ([facility] [address] )  Assessment & Plan  -Continue home regimen of Valium, Cymbalta and [facility]  -Psychiatry consult appreciated  - - reports "being depressed as hell" because of his numerous back surgeries causing chronic pain and effecting his life, and lifestyle, and abilities to do thing, and be active  Has had neuropsychiatric consult who evaluated  Denies SI/HI          VTE Pharmacologic Prophylaxis: VTE Score: 2 Low Risk (Score 0-2) - Encourage Ambulation  Patient Centered Rounds: I performed bedside rounds with nursing staff today  Discussions with Specialists or Other Care Team Provider: case management    Education and Discussions with Family / Patient: Attempted to update  (son) via phone  Unable to contact  Time Spent for Care: 45 minutes  More than 50% of total time spent on counseling and coordination of care as described above  Current Length of Stay: 10 day(s)  Current Patient Status: Inpatient   Certification Statement: The patient will continue to require additional inpatient hospital stay due to rehab placement with suboxone  Discharge Plan: medically stable  pending placement    Code Status: Level 1 - Full Code    Subjective:   Pt in chair with friend at bedside  Pt very talkative but unable to follow most of his conversation as he continue to have tangential speech and flight of ideas    Objective:     Vitals:   Temp (24hrs), Av 3 °F (36 8 °C), Min:98 °F (36 7 °C), Max:98 7 °F (37 1 °C)    Temp:  [98 °F (36 7 °C)-98 7 °F (37 1 °C)] 98 2 °F (36 8 °C)  HR:  [79-90] 84  Resp:  [17-19] 17  BP: (117-123)/(60-78) 123/78  SpO2:  [95 %] 95 %  Body mass index is 26 01 kg/m²  Input and Output Summary (last 24 hours): Intake/Output Summary (Last 24 hours) at 2022 1653  Last data filed at 2022 1400  Gross per 24 hour   Intake 720 ml   Output 425 ml   Net 295 ml       Physical Exam:   Physical Exam  Vitals and nursing note reviewed  Constitutional:       Appearance: He is well-developed  HENT:      Head: Normocephalic and atraumatic  Mouth/Throat:      Mouth: Mucous membranes are moist    Eyes:      Extraocular Movements: Extraocular movements intact  Conjunctiva/sclera: Conjunctivae normal       Pupils: Pupils are equal, round, and reactive to light  Cardiovascular:      Rate and Rhythm: Normal rate and regular rhythm  Heart sounds: Normal heart sounds  No murmur heard  Pulmonary:      Effort: Pulmonary effort is normal  No respiratory distress  Breath sounds: Normal breath sounds  No wheezing or rales  Abdominal:      General: Bowel sounds are normal  There is no distension  Palpations: Abdomen is soft  Tenderness: There is no abdominal tenderness  Musculoskeletal:      Cervical back: Neck supple  Right lower leg: No edema  Left lower leg: No edema  Skin:     General: Skin is warm and dry  Capillary Refill: Capillary refill takes less than 2 seconds  Neurological:      General: No focal deficit present  Mental Status: He is alert  Mental status is at baseline  Psychiatric:         Mood and Affect: Mood normal          Speech: Speech is tangential          Behavior: Behavior is cooperative  Cognition and Memory: Cognition is impaired (per psych eval)  Additional Data:     Labs:  Results from last 7 days   Lab Units 05/05/22  0551 05/04/22 0443 05/04/22 0443   WBC Thousand/uL 6 92   < > 4 59   HEMOGLOBIN g/dL 10 9*   < > 8 7*   HEMATOCRIT % 33 4*   < > 27 5*   PLATELETS Thousands/uL 405*   < > 342   NEUTROS PCT %  --   --  59   LYMPHS PCT %  --   --  23   MONOS PCT %  --   --  10   EOS PCT %  --   --  8*    < > = values in this interval not displayed       Results from last 7 days   Lab Units 05/05/22  0551 05/04/22  0443 05/04/22  0443 05/03/22  0538 05/03/22  0538   SODIUM mmol/L 138   < > 140   < > 141   POTASSIUM mmol/L 4 0   < > 3 9   < > 3 7   CHLORIDE mmol/L 102   < > 106   < > 106   CO2 mmol/L 32   < > 32   < > 30   BUN mg/dL 7   < > 6   < > 6   CREATININE mg/dL 0 73   < > 0 63   < > 0 64   ANION GAP mmol/L 4   < > 2*   < > 5   CALCIUM mg/dL 8 7   < > 7 8*   < > 7 7*   ALBUMIN g/dL  --   --  2 4*   < > 2 5*   TOTAL BILIRUBIN mg/dL  --   --   --   --  0 22   ALK PHOS U/L  --   --   --   --  99   ALT U/L  --   --   --   --  10*   AST U/L  --   --   --   --  18   GLUCOSE RANDOM mg/dL 111   < > 104   < > 101    < > = values in this interval not displayed  Results from last 7 days   Lab Units 05/03/22  0633   INR  0 97             Results from last 7 days   Lab Units 05/03/22  0538   PROCALCITONIN ng/ml <0 05       Lines/Drains:  Invasive Devices  Report    Peripheral Intravenous Line            Peripheral IV 05/05/22 Distal;Right;Upper;Ventral (anterior) Arm <1 day                      Imaging: No pertinent imaging reviewed      Recent Cultures (last 7 days):         Last 24 Hours Medication List:   Current Facility-Administered Medications   Medication Dose Route Frequency Provider Last Rate    acetaminophen  650 mg Oral Q6H PRN Ana Garcia MD      amoxicillin-clavulanate  1 tablet Oral Q12H [address] Drew Delgado MD      aspirin  81 mg Oral Daily Ana Garcia MD      atorvastatin  40 mg Oral QPM Ana Garcia MD      buprenorphine-naloxone  8 mg Sublingual TID Ana Garcia MD      calcium carbonate  1,000 mg Oral Daily PRN Ana Garcia MD      collagenase   Topical Daily Walda High, CRNP      diazepam  5 mg Oral Q8H PRN Ana Garcia MD      DULoxetine  120 mg Oral Daily Ana Garcia MD      heparin (porcine)  5,000 Units Subcutaneous [facility] Drew Delgado MD      ibuprofen  600 mg Oral Q6H PRN Drew Delgado MD      lidocaine  1 patch Topical Daily Drew Delgado MD      lurasidone  20 mg Oral HS Ana Garcia MD      nicotine  1 patch Transdermal Daily Ana Garcia MD      ondansetron  4 mg Intravenous Q6H PRN Ana Garcia MD      saccharomyces boulardii  250 mg Oral BID Drew Delgado MD      tamsulosin  0 4 mg Oral Daily With Brittany Crespo MD      traZODone  150 mg Oral HS Veronique Cotto MD          Today, Patient Was Seen By: NINI Clifford    **Please Note: This note may have been constructed using a voice recognition system  **

## 2022-05-05 NOTE — PLAN OF CARE
Problem: MOBILITY - ADULT  Goal: Maintain or return to baseline ADL function  Description: INTERVENTIONS:  -  Assess patient's ability to carry out ADLs; assess patient's baseline for ADL function and identify physical deficits which impact ability to perform ADLs (bathing, care of mouth/teeth, toileting, grooming, dressing, etc )  - Assess/evaluate cause of self-care deficits   - Assess range of motion  - Assess patient's mobility; develop plan if impaired  - Assess patient's need for assistive devices and provide as appropriate  - Encourage maximum independence but intervene and supervise when necessary  - Involve family in performance of ADLs  - Assess for home care needs following discharge   - Consider OT consult to assist with ADL evaluation and planning for discharge  - Provide patient education as appropriate  Outcome: Progressing  Goal: Maintains/Returns to pre admission functional level  Description: INTERVENTIONS:  - Perform BMAT or MOVE assessment daily    - Set and communicate daily mobility goal to care team and patient/family/caregiver     - Collaborate with rehabilitation services on mobility goals if consulted  - Out of bed for meals 3 times a day  - Out of bed for toileting  - Record patient progress and toleration of activity level   Outcome: Progressing     Problem: PAIN - ADULT  Goal: Verbalizes/displays adequate comfort level or baseline comfort level  Description: Interventions:  - Encourage patient to monitor pain and request assistance  - Assess pain using appropriate pain scale  - Administer analgesics based on type and severity of pain and evaluate response  - Implement non-pharmacological measures as appropriate and evaluate response  - Consider cultural and social influences on pain and pain management  - Notify physician/advanced practitioner if interventions unsuccessful or patient reports new pain  Outcome: Progressing     Problem: INFECTION - ADULT  Goal: Absence or prevention of progression during hospitalization  Description: INTERVENTIONS:  - Assess and monitor for signs and symptoms of infection  - Monitor lab/diagnostic results  - Monitor all insertion sites, i e  indwelling lines, tubes, and drains  - Monitor endotracheal if appropriate and nasal secretions for changes in amount and color  - [place] appropriate cooling/warming therapies per order  - Administer medications as ordered  - Instruct and encourage patient and family to use good hand hygiene technique  - Identify and instruct in appropriate isolation precautions for identified infection/condition  Outcome: Progressing     Problem: DISCHARGE PLANNING  Goal: Discharge to home or other facility with appropriate resources  Description: INTERVENTIONS:  - Identify barriers to discharge w/patient and caregiver  - Arrange for needed discharge resources and transportation as appropriate  - Identify discharge learning needs (meds, wound care, etc )  - Arrange for interpretive services to assist at discharge as needed  - Refer to Case Management Department for coordinating discharge planning if the patient needs post-hospital services based on physician/advanced practitioner order or complex needs related to functional status, cognitive ability, or social support system  Outcome: Progressing     Problem: Knowledge Deficit  Goal: Patient/family/caregiver demonstrates understanding of disease process, treatment plan, medications, and discharge instructions  Description: Complete learning assessment and assess knowledge base    Interventions:  - Provide teaching at level of understanding  - Provide teaching via preferred learning methods  Outcome: Progressing     Problem: NEUROSENSORY - ADULT  Goal: Achieves stable or improved neurological status  Description: INTERVENTIONS  - Monitor and report changes in neurological status  - Monitor vital signs such as temperature, blood pressure, glucose, and any other labs ordered   - Initiate measures to prevent increased intracranial pressure  - Monitor for seizure activity and implement precautions if appropriate      Outcome: Progressing  Goal: Remains free of injury related to seizures activity  Description: INTERVENTIONS  - Maintain airway, patient safety  and administer oxygen as ordered  - Monitor patient for seizure activity, document and report duration and description of seizure to physician/advanced practitioner  - If seizure occurs,  ensure patient safety during seizure  - Reorient patient post seizure  - Seizure pads on all 4 side rails  - Instruct patient/family to notify RN of any seizure activity including if an aura is experienced  - Instruct patient/family to call for assistance with activity based on nursing assessment  - Administer anti-seizure medications if ordered    Outcome: Progressing  Goal: Achieves maximal functionality and self care  Description: INTERVENTIONS  - Monitor swallowing and airway patency with patient fatigue and changes in neurological status  - Encourage and assist patient to increase activity and self care     - Encourage visually impaired, hearing impaired and aphasic patients to use assistive/communication devices  Outcome: Progressing     Problem: MUSCULOSKELETAL - ADULT  Goal: Maintain or return mobility to safest level of function  Description: INTERVENTIONS:  - Assess patient's ability to carry out ADLs; assess patient's baseline for ADL function and identify physical deficits which impact ability to perform ADLs (bathing, care of mouth/teeth, toileting, grooming, dressing, etc )  - Assess/evaluate cause of self-care deficits   - Assess range of motion  - Assess patient's mobility  - Assess patient's need for assistive devices and provide as appropriate  - Encourage maximum independence but intervene and supervise when necessary  - Involve family in performance of ADLs  - Assess for home care needs following discharge   - Consider OT consult to assist with ADL evaluation and planning for discharge  - Provide patient education as appropriate  Outcome: Progressing  Goal: Maintain proper alignment of affected body part  Description: INTERVENTIONS:  - Support, maintain and protect limb and body alignment  - Provide patient/ family with appropriate education  Outcome: Progressing     Problem: Neurological Deficit  Goal: Neurological status is stable or improving  Description: Interventions:  - Monitor and assess patient's level of consciousness, motor function, sensory function, and level of assistance needed for ADLs  - Monitor and report changes from baseline  Collaborate with interdisciplinary team to initiate plan and implement interventions as ordered  - Provide and maintain a safe environment  - Consider seizure precautions  - Consider fall precautions  - Consider aspiration precautions  - Consider bleeding precautions  Outcome: Progressing     Problem: Activity Intolerance/Impaired Mobility  Goal: Mobility/activity is maintained at optimum level for patient  Description: Interventions:  - Assess and monitor patient  barriers to mobility and need for assistive/adaptive devices  - Assess patient's emotional response to limitations  - Collaborate with interdisciplinary team and initiate plans and interventions as ordered  - Encourage independent activity per ability   - Maintain proper body alignment  - Perform active/passive rom as tolerated/ordered  - Plan activities to conserve energy   - Turn patient as appropriate  Outcome: Progressing     Problem: Communication Impairment  Goal: Ability to express needs and understand communication  Description: Assess patient's communication skills and ability to understand information  Patient will demonstrate use of effective communication techniques, alternative methods of communication and understanding even if not able to speak       - Encourage communication and provide alternate methods of communication as needed  - Collaborate with case management/ for discharge needs  - Include patient/family/caregiver in decisions related to communication  Outcome: Progressing     Problem: Potential for Aspiration  Goal: Non-ventilated patient's risk of aspiration is minimized  Description: Assess and monitor vital signs, respiratory status, and labs (WBC)  Monitor for signs of aspiration (tachypnea, cough, rales, wheezing, cyanosis, fever)  - Assess and monitor patient's ability to swallow  - Place patient up in chair to eat if possible  - HOB up at 90 degrees to eat if unable to get patient up into chair   - Supervise patient during oral intake  - Instruct patient/ family to take small bites  - Instruct patient/ family to take small single sips when taking liquids  - Follow patient-specific strategies generated by speech pathologist   Outcome: Progressing     Problem: Nutrition  Goal: Nutrition/Hydration status is improving  Description: Monitor and assess patient's nutrition/hydration status for malnutrition (ex- brittle hair, bruises, dry skin, pale skin and conjunctiva, muscle wasting, smooth red tongue, and disorientation)  Collaborate with interdisciplinary team and initiate plan and interventions as ordered  Monitor patient's weight and dietary intake as ordered or per policy  Utilize nutrition screening tool and intervene per policy  Determine patient's food preferences and provide high-protein, high-caloric foods as appropriate  - Assist patient with eating   - Allow adequate time for meals   - Encourage patient to take dietary supplement as ordered  - Collaborate with clinical nutritionist   - Include patient/family/caregiver in decisions related to nutrition    Outcome: Progressing     Problem: Prexisting or High Potential for Compromised Skin Integrity  Goal: Skin integrity is maintained or improved  Description: INTERVENTIONS:  - Identify patients at risk for skin breakdown  - Assess and monitor skin integrity  - Assess and monitor nutrition and hydration status  - Monitor labs   - Assess for incontinence   - Turn and reposition patient  - Assist with mobility/ambulation  - Relieve pressure over bony prominences  - Avoid friction and shearing  - Provide appropriate hygiene as needed including keeping skin clean and dry  - Evaluate need for skin moisturizer/barrier cream  - Collaborate with interdisciplinary team   - Patient/family teaching  - Consider wound care consult   Outcome: Progressing     Problem: Potential for Falls  Goal: Patient will remain free of falls  Description: INTERVENTIONS:  - Educate patient/family on patient safety including physical limitations  - Instruct patient to call for assistance with activity   - Consult OT/PT to assist with strengthening/mobility   - Keep Call bell within reach  - Keep bed low and locked with side rails adjusted as appropriate  - Keep care items and personal belongings within reach  - Initiate and maintain comfort rounds  - Make Fall Risk Sign visible to staff  - Offer Toileting every 2 Hours, in advance of need  - Initiate/Maintain bed alarm  - Apply yellow socks and bracelet for high fall risk patients  - Consider moving patient to room near nurses station  Outcome: Progressing     Problem: Nutrition/Hydration-ADULT  Goal: Nutrient/Hydration intake appropriate for improving, restoring or maintaining nutritional needs  Description: Monitor and assess patient's nutrition/hydration status for malnutrition  Collaborate with interdisciplinary team and initiate plan and interventions as ordered  Monitor patient's weight and dietary intake as ordered or per policy  Utilize nutrition screening tool and intervene as necessary  Determine patient's food preferences and provide high-protein, high-caloric foods as appropriate       INTERVENTIONS:  - Monitor oral intake, urinary output, labs, and treatment plans  - Assess nutrition and hydration status and recommend course of action  - Evaluate amount of meals eaten  - Assist patient with eating if necessary   - Allow adequate time for meals  - Recommend/ encourage appropriate diets, oral nutritional supplements, and vitamin/mineral supplements  - Order, calculate, and assess calorie counts as needed  - Recommend, monitor, and adjust tube feedings and TPN/PPN based on assessed needs  - Assess need for intravenous fluids  - Provide specific nutrition/hydration education as appropriate  - Include patient/family/caregiver in decisions related to nutrition  Outcome: Progressing     Problem: SKIN/TISSUE INTEGRITY - ADULT  Goal: Incision(s), wounds(s) or drain site(s) healing without S/S of infection  Description: INTERVENTIONS  - Assess and document dressing, incision, wound bed, drain sites and surrounding tissue  - Provide patient and family education  - Perform skin care/dressing changes   Outcome: Progressing  Goal: Pressure injury heals and does not worsen  Description: Interventions:  - Implement low air loss mattress or specialty surface (Criteria met)  - Apply silicone foam dressing  - Apply fecal or urinary incontinence containment device   - Turn and reposition patient & offload bony prominences every 2 hours   - Utilize friction reducing device or surface for transfers   - Consider nutrition services referral as needed  Outcome: Progressing

## 2022-05-05 NOTE — CASE MANAGEMENT
Case Management Discharge Planning Note    Patient name Sri Bernabe  Location [address] [facility] [id number] MRN [id number]  : 1960 Date 2022       Current Admission Date: 2022  Current Admission Diagnosis:Toxic encephalopathy   Patient Active Problem List    Diagnosis Date Noted    Swelling of left upper extremity 2022    Goals of care, counseling/discussion 2022    Empty sella turcica ([facility] [address] ) 2022    Opioid dependence ([facility] [facility] ) 2022    Sacral decubitus ulcer, stage III ([facility] [address] ) 2022    Back pain 2022    Toxic encephalopathy 2022    Closed fracture of multiple ribs of left side with routine healing 2022    Left humeral fracture 2022    Acute pain of left shoulder 2022    Closed 2-part displaced fracture of surgical neck of left humerus 2022    Other hyperlipidemia 2022    Memory loss 2022    Closed fracture of head of left humerus 2022    Class 1 obesity due to excess calories without serious comorbidity with body mass index (BMI) of 30 0 to 30 9 in adult 03/10/2016    Bipolar affective disorder ([facility] [address] ) 2013      LOS (days): 10  Geometric Mean LOS (GMLOS) (days): 4 20  Days to GMLOS:-5 9     OBJECTIVE:  Risk of Unplanned Readmission Score: 16         Current admission status: Inpatient   Preferred Pharmacy:   [facility] [id number] [place] [address]  Phone: [phone] Fax: [phone]    Primary Care Provider: Lizet Bui MD    Primary Insurance: MEDICARE  Secondary Insurance: [id number]    DISCHARGE DETAILS:       CM call to patient Pain management  Dr Machelle Mathew  [phone] to reach out for any possible STR they may know of that accept patient on Suboxone due to difficulty finding STR for mutual patient secondary to pain management      Cm reached out to [facility] for bed availability no answer vague message left to call CM back concerning referral  Placed in [address]  Cm spoke with Kayleigh Butler at [facility] she suggested they have sister company in [facility] that take patients with Suboxone therapy  Kayleigh Butler stated she will call the  and forward the referral to them for acceptance    [facility]  [facility]  [facility]    Kayleigh Butler  From [facility]  to call CM back tomorrow 5/6/22

## 2022-05-06 PROCEDURE — 99232 SBSQ HOSP IP/OBS MODERATE 35: CPT

## 2022-05-06 RX ADMIN — BUPRENORPHINE AND NALOXONE 8 MG: 8; 2 FILM BUCCAL; SUBLINGUAL at 16:18

## 2022-05-06 RX ADMIN — AMOXICILLIN AND CLAVULANATE POTASSIUM 1 TABLET: 875; 125 TABLET, FILM COATED ORAL at 09:02

## 2022-05-06 RX ADMIN — HEPARIN SODIUM 5000 UNITS: 5000 INJECTION INTRAVENOUS; SUBCUTANEOUS at 21:03

## 2022-05-06 RX ADMIN — ATORVASTATIN CALCIUM 40 MG: 40 TABLET, FILM COATED ORAL at 16:17

## 2022-05-06 RX ADMIN — TRAZODONE HYDROCHLORIDE 150 MG: 100 TABLET ORAL at 21:02

## 2022-05-06 RX ADMIN — HEPARIN SODIUM 5000 UNITS: 5000 INJECTION INTRAVENOUS; SUBCUTANEOUS at 05:49

## 2022-05-06 RX ADMIN — TAMSULOSIN HYDROCHLORIDE 0.4 MG: 0.4 CAPSULE ORAL at 16:17

## 2022-05-06 RX ADMIN — DIAZEPAM 5 MG: 5 TABLET ORAL at 06:58

## 2022-05-06 RX ADMIN — Medication 250 MG: at 16:17

## 2022-05-06 RX ADMIN — AMOXICILLIN AND CLAVULANATE POTASSIUM 1 TABLET: 875; 125 TABLET, FILM COATED ORAL at 21:02

## 2022-05-06 RX ADMIN — Medication 250 MG: at 09:02

## 2022-05-06 RX ADMIN — DULOXETINE HYDROCHLORIDE 120 MG: 60 CAPSULE, DELAYED RELEASE ORAL at 09:02

## 2022-05-06 RX ADMIN — NICOTINE 7 MG/24 HR DAILY TRANSDERMAL PATCH 1 PATCH: at 09:02

## 2022-05-06 RX ADMIN — BUPRENORPHINE AND NALOXONE 8 MG: 8; 2 FILM BUCCAL; SUBLINGUAL at 21:03

## 2022-05-06 RX ADMIN — LURASIDONE HYDROCHLORIDE 20 MG: 20 TABLET, FILM COATED ORAL at 21:03

## 2022-05-06 RX ADMIN — ASPIRIN 81 MG CHEWABLE TABLET 81 MG: 81 TABLET CHEWABLE at 09:02

## 2022-05-06 RX ADMIN — HEPARIN SODIUM 5000 UNITS: 5000 INJECTION INTRAVENOUS; SUBCUTANEOUS at 13:19

## 2022-05-06 RX ADMIN — COLLAGENASE SANTYL: 250 OINTMENT TOPICAL at 09:02

## 2022-05-06 NOTE — PHYSICAL THERAPY NOTE
Physical Therapy Cancellation Note      Patient's Name: Huang Lala  [id number] Date: 5/6/22 05/06/22 2906   Note Type   Note Type Cancelled Session     Chart review completed  Attempted to see patient for treatment session however pt refusing stating "I'm not doing that [getting up and walking]  " PT provided education on benefits of increased mobility however pt continues to refuse services at this time      Kadie Elam, PT, DPT

## 2022-05-06 NOTE — PLAN OF CARE
Problem: MOBILITY - ADULT  Goal: Maintain or return to baseline ADL function  Description: INTERVENTIONS:  -  Assess patient's ability to carry out ADLs; assess patient's baseline for ADL function and identify physical deficits which impact ability to perform ADLs (bathing, care of mouth/teeth, toileting, grooming, dressing, etc )  - Assess/evaluate cause of self-care deficits   - Assess range of motion  - Assess patient's mobility; develop plan if impaired  - Assess patient's need for assistive devices and provide as appropriate  - Encourage maximum independence but intervene and supervise when necessary  - Involve family in performance of ADLs  - Assess for home care needs following discharge   - Consider OT consult to assist with ADL evaluation and planning for discharge  - Provide patient education as appropriate  Outcome: Progressing  Goal: Maintains/Returns to pre admission functional level  Description: INTERVENTIONS:  - Perform BMAT or MOVE assessment daily    - Set and communicate daily mobility goal to care team and patient/family/caregiver     - Collaborate with rehabilitation services on mobility goals if consulted  - Out of bed for meals 3 times a day  - Out of bed for toileting  - Record patient progress and toleration of activity level   Outcome: Progressing     Problem: PAIN - ADULT  Goal: Verbalizes/displays adequate comfort level or baseline comfort level  Description: Interventions:  - Encourage patient to monitor pain and request assistance  - Assess pain using appropriate pain scale  - Administer analgesics based on type and severity of pain and evaluate response  - Implement non-pharmacological measures as appropriate and evaluate response  - Consider cultural and social influences on pain and pain management  - Notify physician/advanced practitioner if interventions unsuccessful or patient reports new pain  Outcome: Progressing     Problem: INFECTION - ADULT  Goal: Absence or prevention of progression during hospitalization  Description: INTERVENTIONS:  - Assess and monitor for signs and symptoms of infection  - Monitor lab/diagnostic results  - Monitor all insertion sites, i e  indwelling lines, tubes, and drains  - Monitor endotracheal if appropriate and nasal secretions for changes in amount and color  - [place] appropriate cooling/warming therapies per order  - Administer medications as ordered  - Instruct and encourage patient and family to use good hand hygiene technique  - Identify and instruct in appropriate isolation precautions for identified infection/condition  Outcome: Progressing     Problem: DISCHARGE PLANNING  Goal: Discharge to home or other facility with appropriate resources  Description: INTERVENTIONS:  - Identify barriers to discharge w/patient and caregiver  - Arrange for needed discharge resources and transportation as appropriate  - Identify discharge learning needs (meds, wound care, etc )  - Arrange for interpretive services to assist at discharge as needed  - Refer to Case Management Department for coordinating discharge planning if the patient needs post-hospital services based on physician/advanced practitioner order or complex needs related to functional status, cognitive ability, or social support system  Outcome: Progressing     Problem: Knowledge Deficit  Goal: Patient/family/caregiver demonstrates understanding of disease process, treatment plan, medications, and discharge instructions  Description: Complete learning assessment and assess knowledge base    Interventions:  - Provide teaching at level of understanding  - Provide teaching via preferred learning methods  Outcome: Progressing     Problem: NEUROSENSORY - ADULT  Goal: Achieves stable or improved neurological status  Description: INTERVENTIONS  - Monitor and report changes in neurological status  - Monitor vital signs such as temperature, blood pressure, glucose, and any other labs ordered   - Initiate measures to prevent increased intracranial pressure  - Monitor for seizure activity and implement precautions if appropriate      Outcome: Progressing  Goal: Remains free of injury related to seizures activity  Description: INTERVENTIONS  - Maintain airway, patient safety  and administer oxygen as ordered  - Monitor patient for seizure activity, document and report duration and description of seizure to physician/advanced practitioner  - If seizure occurs,  ensure patient safety during seizure  - Reorient patient post seizure  - Seizure pads on all 4 side rails  - Instruct patient/family to notify RN of any seizure activity including if an aura is experienced  - Instruct patient/family to call for assistance with activity based on nursing assessment  - Administer anti-seizure medications if ordered    Outcome: Progressing  Goal: Achieves maximal functionality and self care  Description: INTERVENTIONS  - Monitor swallowing and airway patency with patient fatigue and changes in neurological status  - Encourage and assist patient to increase activity and self care     - Encourage visually impaired, hearing impaired and aphasic patients to use assistive/communication devices  Outcome: Progressing     Problem: MUSCULOSKELETAL - ADULT  Goal: Maintain or return mobility to safest level of function  Description: INTERVENTIONS:  - Assess patient's ability to carry out ADLs; assess patient's baseline for ADL function and identify physical deficits which impact ability to perform ADLs (bathing, care of mouth/teeth, toileting, grooming, dressing, etc )  - Assess/evaluate cause of self-care deficits   - Assess range of motion  - Assess patient's mobility  - Assess patient's need for assistive devices and provide as appropriate  - Encourage maximum independence but intervene and supervise when necessary  - Involve family in performance of ADLs  - Assess for home care needs following discharge   - Consider OT consult to assist with ADL evaluation and planning for discharge  - Provide patient education as appropriate  Outcome: Progressing  Goal: Maintain proper alignment of affected body part  Description: INTERVENTIONS:  - Support, maintain and protect limb and body alignment  - Provide patient/ family with appropriate education  Outcome: Progressing     Problem: Neurological Deficit  Goal: Neurological status is stable or improving  Description: Interventions:  - Monitor and assess patient's level of consciousness, motor function, sensory function, and level of assistance needed for ADLs  - Monitor and report changes from baseline  Collaborate with interdisciplinary team to initiate plan and implement interventions as ordered  - Provide and maintain a safe environment  - Consider seizure precautions  - Consider fall precautions  - Consider aspiration precautions  - Consider bleeding precautions  Outcome: Progressing     Problem: Activity Intolerance/Impaired Mobility  Goal: Mobility/activity is maintained at optimum level for patient  Description: Interventions:  - Assess and monitor patient  barriers to mobility and need for assistive/adaptive devices  - Assess patient's emotional response to limitations  - Collaborate with interdisciplinary team and initiate plans and interventions as ordered  - Encourage independent activity per ability   - Maintain proper body alignment  - Perform active/passive rom as tolerated/ordered  - Plan activities to conserve energy   - Turn patient as appropriate  Outcome: Progressing     Problem: Communication Impairment  Goal: Ability to express needs and understand communication  Description: Assess patient's communication skills and ability to understand information  Patient will demonstrate use of effective communication techniques, alternative methods of communication and understanding even if not able to speak       - Encourage communication and provide alternate methods of communication as needed  - Collaborate with case management/ for discharge needs  - Include patient/family/caregiver in decisions related to communication  Outcome: Progressing     Problem: Potential for Aspiration  Goal: Non-ventilated patient's risk of aspiration is minimized  Description: Assess and monitor vital signs, respiratory status, and labs (WBC)  Monitor for signs of aspiration (tachypnea, cough, rales, wheezing, cyanosis, fever)  - Assess and monitor patient's ability to swallow  - Place patient up in chair to eat if possible  - HOB up at 90 degrees to eat if unable to get patient up into chair   - Supervise patient during oral intake  - Instruct patient/ family to take small bites  - Instruct patient/ family to take small single sips when taking liquids  - Follow patient-specific strategies generated by speech pathologist   Outcome: Progressing     Problem: Nutrition  Goal: Nutrition/Hydration status is improving  Description: Monitor and assess patient's nutrition/hydration status for malnutrition (ex- brittle hair, bruises, dry skin, pale skin and conjunctiva, muscle wasting, smooth red tongue, and disorientation)  Collaborate with interdisciplinary team and initiate plan and interventions as ordered  Monitor patient's weight and dietary intake as ordered or per policy  Utilize nutrition screening tool and intervene per policy  Determine patient's food preferences and provide high-protein, high-caloric foods as appropriate  - Assist patient with eating   - Allow adequate time for meals   - Encourage patient to take dietary supplement as ordered  - Collaborate with clinical nutritionist   - Include patient/family/caregiver in decisions related to nutrition    Outcome: Progressing     Problem: Prexisting or High Potential for Compromised Skin Integrity  Goal: Skin integrity is maintained or improved  Description: INTERVENTIONS:  - Identify patients at risk for skin breakdown  - Assess and monitor skin integrity  - Assess and monitor nutrition and hydration status  - Monitor labs   - Assess for incontinence   - Turn and reposition patient  - Assist with mobility/ambulation  - Relieve pressure over bony prominences  - Avoid friction and shearing  - Provide appropriate hygiene as needed including keeping skin clean and dry  - Evaluate need for skin moisturizer/barrier cream  - Collaborate with interdisciplinary team   - Patient/family teaching  - Consider wound care consult   Outcome: Progressing     Problem: Potential for Falls  Goal: Patient will remain free of falls  Description: INTERVENTIONS:  - Educate patient/family on patient safety including physical limitations  - Instruct patient to call for assistance with activity   - Consult OT/PT to assist with strengthening/mobility   - Keep Call bell within reach  - Keep bed low and locked with side rails adjusted as appropriate  - Keep care items and personal belongings within reach  - Initiate and maintain comfort rounds  - Make Fall Risk Sign visible to staff  - Offer Toileting every 2 Hours, in advance of need  - Initiate/Maintain bed alarm  - Apply yellow socks and bracelet for high fall risk patients  - Consider moving patient to room near nurses station  Outcome: Progressing     Problem: Nutrition/Hydration-ADULT  Goal: Nutrient/Hydration intake appropriate for improving, restoring or maintaining nutritional needs  Description: Monitor and assess patient's nutrition/hydration status for malnutrition  Collaborate with interdisciplinary team and initiate plan and interventions as ordered  Monitor patient's weight and dietary intake as ordered or per policy  Utilize nutrition screening tool and intervene as necessary  Determine patient's food preferences and provide high-protein, high-caloric foods as appropriate       INTERVENTIONS:  - Monitor oral intake, urinary output, labs, and treatment plans  - Assess nutrition and hydration status and recommend course of action  - Evaluate amount of meals eaten  - Assist patient with eating if necessary   - Allow adequate time for meals  - Recommend/ encourage appropriate diets, oral nutritional supplements, and vitamin/mineral supplements  - Order, calculate, and assess calorie counts as needed  - Recommend, monitor, and adjust tube feedings and TPN/PPN based on assessed needs  - Assess need for intravenous fluids  - Provide specific nutrition/hydration education as appropriate  - Include patient/family/caregiver in decisions related to nutrition  Outcome: Progressing     Problem: SKIN/TISSUE INTEGRITY - ADULT  Goal: Incision(s), wounds(s) or drain site(s) healing without S/S of infection  Description: INTERVENTIONS  - Assess and document dressing, incision, wound bed, drain sites and surrounding tissue  - Provide patient and family education  - Perform skin care/dressing changes   Outcome: Progressing  Goal: Pressure injury heals and does not worsen  Description: Interventions:  - Implement low air loss mattress or specialty surface (Criteria met)  - Apply silicone foam dressing  - Apply fecal or urinary incontinence containment device   - Turn and reposition patient & offload bony prominences every 2 hours   - Utilize friction reducing device or surface for transfers   - Consider nutrition services referral as needed  Outcome: Progressing

## 2022-05-06 NOTE — PROGRESS NOTES
[address]  Progress Note - Neel Willoughby 1960, 63 y o  male MRN: [id number]  Unit/Bed#: -[facility] Encounter: [id number]  Primary Care Provider: Jannet Osorio MD   Date and time admitted to hospital: 4/25/2022 10:18 AM    * Toxic encephalopathy  Assessment & Plan  -Most likely secondary to Suboxone and Valium  -MRI of the brain also shows empty sella    -Mental status appears to have returned to baseline, will discuss with patient's family    -Resume home medications    -Neurology consult appreciated  -PT OT recommends to discharge patient on rehab-since patient is on Suboxone, which can affect the discharge planning and placement  Case management on board  - pt follow with Manuel Colin      Swelling of left upper extremity  Assessment & Plan  -Unknown etiology  -Patient does have history of left humeral fracture-and left upper extremity on triangular sling for immobilization  venous duplex of left upper extremity negative  -DC IV fluid    Empty sella turcica ([address] )  Assessment & Plan  -Incidental finding  -TSH normal, does not have any visual change  No electrolyte imbalance  No signs of symptoms of acromegaly,  -Patient will need outpatient follow-up with Neurology and Ophthalmology-as per Neurology recommendation    Goals of care, counseling/discussion  Assessment & Plan  -Discussion has done informed of patient's, with patient's son, and case management   -As per neuropsych, patient is incapable to make informed decision  Sacral decubitus ulcer, stage III ([facility])  Assessment & Plan  -Continue wound care  -Follow wound culture  -Wound culture shows E coli, Gram-positive rods, Gram-positive cocci-sensitive to Augmentin, started Augmentin for 7 days   Through 5/6  -wound care evaluation- addition of santyl daily left buttock, to replace maxorb Ag  - continue Hydrea carotid to bilateral sacrum, buttock and bilateral heels  - Q2 turns, offload heels, air cushion when out of bed    Opioid dependence (HCC)  Assessment & Plan  -chronic back pain, evidenced by daily use of Suboxone 8mg three times a day, requiring continued regime in hospital      -PT OT recommends rehab placement, but since patient is on Suboxone, unable to find place around this area-case management on board,      Left humeral fracture  Assessment & Plan  Patient had left humeral fracture diagnosed on 04/04/2022 on an ED visit  Still present on imaging  -Persistent medially displaced previously acute left humeral head and neck fracture  · Orthopedic recommendation appreciated:Patient may not lift more than 1 lb with the left upper extremity  · PT/OT pendulum exercises, active assisted range of motion, below shoulder height left upper extremity  PT OT recommends rehab placement, but since patient is on Suboxone, unable to find place around this area-case management on board,      Closed fracture of multiple ribs of left side with routine healing  Assessment & Plan  -CT chest and pelvis shows There are fractures of the left 6, 7th, 8th, and 9th ribs with reactive bone formation present suggesting that these fractures are subacute or less likely chronic   -Continue pain control  These do not appear to be acute fractures and hence continue supportive care  -PT OT recommends rehab placement, but since patient is on Suboxone, unable to find place around this area-case management on board,  -patient adamant about going home with services, was evaluated by neuropsychiatry 4/28 in deemed not competent in making medical decisions at this point    Back pain  Assessment & Plan  -Patient is complaining of chronic back pain  With multiple surgeries   -Continue Suboxone as per home regimen starting tomorrow  Patient is seen by addiction clinic Counts [facility] beds at the [facility]    Note reviewed   -He is supposed to be on 8 mg 1 film sublingual t i d   - pt has recently reestablished with pain management, restarting suboxone 4/11 per PDMP review, prior to that last Rx '    Other hyperlipidemia  Assessment & Plan  -Continue statin therapy    Bipolar affective disorder [facility])  Assessment & Plan  -Continue home regimen of Valium, Cymbalta and Latuda  -Psychiatry consult appreciated  - - reports "being depressed as hell" because of his numerous back surgeries causing chronic pain and effecting his life, and lifestyle, and abilities to do thing, and be active  Has had neuropsychiatric consult who evaluated  Denies SI/HI          VTE Pharmacologic Prophylaxis: VTE Score: 2 Low Risk (Score 0-2) - Encourage Ambulation  Patient Centered Rounds: I performed bedside rounds with nursing staff today  Discussions with Specialists or Other Care Team Provider: case management    Education and Discussions with Family / Patient: Attempted to update  (son) via phone  Unable to contact  Time Spent for Care: 30 minutes  More than 50% of total time spent on counseling and coordination of care as described above  Current Length of Stay: 11 day(s)  Current Patient Status: Inpatient   Certification Statement: The patient will continue to require additional inpatient hospital stay due to placement pending at rehab accepting suboxone  Discharge Plan: Anticipate discharge in 24-48 hrs to rehab facility  Code Status: Level 1 - Full Code    Subjective:   Pt alert and interactive  Very talkative but circles around a point he is trying to make without reaching it, and gets very side tracked  Denies all other complaints beside chronic back pain      Objective:     Vitals:   Temp (24hrs), Av 4 °F (36 9 °C), Min:98 2 °F (36 8 °C), Max:98 5 °F (36 9 °C)    Temp:  [98 2 °F (36 8 °C)-98 5 °F (36 9 °C)] 98 5 °F (36 9 °C)  HR:  [74-83] 74  Resp:  [16-18] 16  BP: (109-123)/(67-77) 109/67  SpO2:  [90 %-96 %] 96 %  Body mass index is 26 01 kg/m²  Input and Output Summary (last 24 hours):      Intake/Output Summary (Last 24 hours) at 2022 [address] [facility] filed at 5/6/2022 1311  Gross per 24 hour   Intake 240 ml   Output 800 ml   Net -560 ml       Physical Exam:   Physical Exam  Vitals and nursing note reviewed  Constitutional:       General: He is not in acute distress  Appearance: He is well-developed  He is not ill-appearing  HENT:      Head: Normocephalic and atraumatic  Mouth/Throat:      Mouth: Mucous membranes are moist    Eyes:      Extraocular Movements: Extraocular movements intact  Conjunctiva/sclera: Conjunctivae normal       Pupils: Pupils are equal, round, and reactive to light  Cardiovascular:      Rate and Rhythm: Normal rate and regular rhythm  Pulses: Normal pulses  Heart sounds: Normal heart sounds  No murmur heard  Pulmonary:      Effort: Pulmonary effort is normal  No respiratory distress  Breath sounds: Normal breath sounds  No wheezing or rales  Abdominal:      General: Bowel sounds are normal  There is no distension  Palpations: Abdomen is soft  Tenderness: There is no abdominal tenderness  There is no guarding  Musculoskeletal:      Cervical back: Neck supple  Right lower leg: No edema  Left lower leg: No edema  Skin:     General: Skin is warm and dry  Capillary Refill: Capillary refill takes less than 2 seconds  Neurological:      Mental Status: He is alert  Mental status is at baseline  Motor: No weakness  Psychiatric:         Mood and Affect: Mood is elated  Speech: Speech is tangential          Behavior: Behavior is cooperative           Additional Data:     Labs:  Results from last 7 days   Lab Units 05/05/22  0551 05/04/22  0443 05/04/22  0443   WBC Thousand/uL 6 92   < > 4 59   HEMOGLOBIN g/dL 10 9*   < > 8 7*   HEMATOCRIT % 33 4*   < > 27 5*   PLATELETS Thousands/uL 405*   < > 342   NEUTROS PCT %  --   --  59   LYMPHS PCT %  --   --  23   MONOS PCT %  --   --  10   EOS PCT %  --   --  8*    < > = values in this interval not displayed  Results from last 7 days   Lab Units 05/05/22  0551 05/04/22  0443 05/04/22  0443 05/03/22  0538 05/03/22  0538   SODIUM mmol/L 138   < > 140   < > 141   POTASSIUM mmol/L 4 0   < > 3 9   < > 3 7   CHLORIDE mmol/L 102   < > 106   < > 106   CO2 mmol/L 32   < > 32   < > 30   BUN mg/dL 7   < > 6   < > 6   CREATININE mg/dL 0 73   < > 0 63   < > 0 64   ANION GAP mmol/L 4   < > 2*   < > 5   CALCIUM mg/dL 8 7   < > 7 8*   < > 7 7*   ALBUMIN g/dL  --   --  2 4*   < > 2 5*   TOTAL BILIRUBIN mg/dL  --   --   --   --  0 22   ALK PHOS U/L  --   --   --   --  99   ALT U/L  --   --   --   --  10*   AST U/L  --   --   --   --  18   GLUCOSE RANDOM mg/dL 111   < > 104   < > 101    < > = values in this interval not displayed  Results from last 7 days   Lab Units 05/03/22  0633   INR  0 97             Results from last 7 days   Lab Units 05/03/22  0538   PROCALCITONIN ng/ml <0 05       Lines/Drains:  Invasive Devices  Report    Peripheral Intravenous Line            Peripheral IV 05/05/22 Distal;Right;Upper;Ventral (anterior) Arm 1 day                      Imaging: No pertinent imaging reviewed      Recent Cultures (last 7 days):         Last 24 Hours Medication List:   Current Facility-Administered Medications   Medication Dose Route Frequency Provider Last Rate    acetaminophen  650 mg Oral Q6H PRN Dank Robles MD      amoxicillin-clavulanate  1 tablet Oral Q12H [facility] Kinjal Royal MD      aspirin  81 mg Oral Daily Dank Robles MD      atorvastatin  40 mg Oral QPM Dank Robles MD      buprenorphine-naloxone  8 mg Sublingual TID Dank Robles MD      calcium carbonate  1,000 mg Oral Daily PRN Dank Robles MD      collagenase   Topical Daily Chavez Coil, CRNP      diazepam  5 mg Oral Q8H PRN Dank Robles MD      DULoxetine  120 mg Oral Daily Dank Robles MD      heparin (porcine)  5,000 Units Subcutaneous [facility] Kinjal Royal MD      ibuprofen  600 mg Oral Q6H PRN MD Shania Bradford lidocaine  1 patch Topical Daily Dwight Stevens MD      lurasidone  20 mg Oral HS Austin Moraes MD      nicotine  1 patch Transdermal Daily Austin Moraes MD      ondansetron  4 mg Intravenous Q6H PRN Austin Moraes MD      saccharomyces boulardii  250 mg Oral BID Dwight Stevens MD      tamsulosin  0 4 mg Oral Daily With Leigh Barber MD      traZODone  150 mg Oral HS Austin Moraes MD          Today, Patient Was Seen By: NINI Alicea    **Please Note: This note may have been constructed using a voice recognition system  **

## 2022-05-06 NOTE — ASSESSMENT & PLAN NOTE
-Most likely secondary to Suboxone and Valium  -MRI of the brain also shows empty sella    -Mental status appears to have returned to baseline, will discuss with patient's family    -Resume home medications    -Neurology consult appreciated  -PT OT recommends to discharge patient on rehab-since patient is on Suboxone, which can affect the discharge planning and placement    Case management on board  - pt follow with Zeinab Liang

## 2022-05-06 NOTE — CASE MANAGEMENT
Case Management Progress Note    Patient name [facility]  Location [id number] MRN [id number]  : 1960 Date 2022       LOS (days): 11  Geometric Mean LOS (GMLOS) (days): 4 20  Days to GMLOS:-6 9        OBJECTIVE:        Current admission status: Inpatient  Preferred Pharmacy:   [facility] [address] [facility], [address] [address]  Phone: [phone] Fax: [phone]    Primary Care Provider: Genesis Suarez MD    Primary Insurance: MEDICARE  Secondary Insurance: [id number]    PROGRESS NOTE:  CM received update from [facility], their sister facility [place] [address] , [address], [address] [facility] [phone])  They would be reaching out to the son, as Pt received capacity eval  They requested Covid vaccination status, and dates if received; CM will provide to the facility  They were requesting Pt to be sent w/ 3 days Suboxone, CM explained the hospital does not dispense and would need to discuss further w/ the facility  CM received call from [facility], Pt had been prescribed Suboxone and given limited prescription with plan for Pt to return for f/  Pt had not attended 2 scheduled [facility] would have Pt return for continued participation in their MAT program and would need to call ([phone]) to schedule  AP reviewed PDMP; Pt received suboxone script  for 15d, and last fill prior to that was 21  CM obtained Vaccination card from Pt, received J&J 21, and Pfizer booster 22  CM will updated [place]/, and continue w/ planning  Addendum:  CM received update via [facility] from [facility], another sister facility [facility] will be reviewing Pt  CM also updated facility of above  [facility] is reviewing  CM did update that Suboxone cannot be dispensed by hospital at d/c

## 2022-05-06 NOTE — ASSESSMENT & PLAN NOTE
Patient had left humeral fracture diagnosed on 04/04/2022 on an ED visit    Still present on imaging  -Persistent medially displaced previously acute left humeral head and neck fracture  · Orthopedic recommendation appreciated:Patient may not lift more than 1 lb with the left upper extremity  · PT/OT pendulum exercises, active assisted range of motion, below shoulder height left upper extremity  PT OT recommends rehab placement, but since patient is on Suboxone, unable to find place around this area-case management on board,

## 2022-05-06 NOTE — ASSESSMENT & PLAN NOTE
-Patient is complaining of chronic back pain  With multiple surgeries   -Continue Suboxone as per home regimen starting tomorrow  Patient is seen by addiction clinic [facility]    Note reviewed   -He is supposed to be on 8 mg 1 film sublingual t i d   - pt has recently reestablished with pain management, restarting suboxone 4/11 per PDMP review, prior to that last Rx 7/21'

## 2022-05-06 NOTE — PLAN OF CARE
Problem: MOBILITY - ADULT  Goal: Maintain or return to baseline ADL function  Description: INTERVENTIONS:  -  Assess patient's ability to carry out ADLs; assess patient's baseline for ADL function and identify physical deficits which impact ability to perform ADLs (bathing, care of mouth/teeth, toileting, grooming, dressing, etc )  - Assess/evaluate cause of self-care deficits   - Assess range of motion  - Assess patient's mobility; develop plan if impaired  - Assess patient's need for assistive devices and provide as appropriate  - Encourage maximum independence but intervene and supervise when necessary  - Involve family in performance of ADLs  - Assess for home care needs following discharge   - Consider OT consult to assist with ADL evaluation and planning for discharge  - Provide patient education as appropriate  Outcome: Progressing  Goal: Maintains/Returns to pre admission functional level  Description: INTERVENTIONS:  - Perform BMAT or MOVE assessment daily    - Set and communicate daily mobility goal to care team and patient/family/caregiver     - Collaborate with rehabilitation services on mobility goals if consulted  - Out of bed for meals 3 times a day  - Out of bed for toileting  - Record patient progress and toleration of activity level   Outcome: Progressing     Problem: PAIN - ADULT  Goal: Verbalizes/displays adequate comfort level or baseline comfort level  Description: Interventions:  - Encourage patient to monitor pain and request assistance  - Assess pain using appropriate pain scale  - Administer analgesics based on type and severity of pain and evaluate response  - Implement non-pharmacological measures as appropriate and evaluate response  - Consider cultural and social influences on pain and pain management  - Notify physician/advanced practitioner if interventions unsuccessful or patient reports new pain  Outcome: Progressing     Problem: INFECTION - ADULT  Goal: Absence or prevention of progression during hospitalization  Description: INTERVENTIONS:  - Assess and monitor for signs and symptoms of infection  - Monitor lab/diagnostic results  - Monitor all insertion sites, i e  indwelling lines, tubes, and drains  - Monitor endotracheal if appropriate and nasal secretions for changes in amount and color  - [place] appropriate cooling/warming therapies per order  - Administer medications as ordered  - Instruct and encourage patient and family to use good hand hygiene technique  - Identify and instruct in appropriate isolation precautions for identified infection/condition  Outcome: Progressing     Problem: DISCHARGE PLANNING  Goal: Discharge to home or other facility with appropriate resources  Description: INTERVENTIONS:  - Identify barriers to discharge w/patient and caregiver  - Arrange for needed discharge resources and transportation as appropriate  - Identify discharge learning needs (meds, wound care, etc )  - Arrange for interpretive services to assist at discharge as needed  - Refer to Case Management Department for coordinating discharge planning if the patient needs post-hospital services based on physician/advanced practitioner order or complex needs related to functional status, cognitive ability, or social support system  Outcome: Progressing     Problem: Knowledge Deficit  Goal: Patient/family/caregiver demonstrates understanding of disease process, treatment plan, medications, and discharge instructions  Description: Complete learning assessment and assess knowledge base    Interventions:  - Provide teaching at level of understanding  - Provide teaching via preferred learning methods  Outcome: Progressing     Problem: NEUROSENSORY - ADULT  Goal: Achieves stable or improved neurological status  Description: INTERVENTIONS  - Monitor and report changes in neurological status  - Monitor vital signs such as temperature, blood pressure, glucose, and any other labs ordered   - Initiate measures to prevent increased intracranial pressure  - Monitor for seizure activity and implement precautions if appropriate      Outcome: Progressing  Goal: Remains free of injury related to seizures activity  Description: INTERVENTIONS  - Maintain airway, patient safety  and administer oxygen as ordered  - Monitor patient for seizure activity, document and report duration and description of seizure to physician/advanced practitioner  - If seizure occurs,  ensure patient safety during seizure  - Reorient patient post seizure  - Seizure pads on all 4 side rails  - Instruct patient/family to notify RN of any seizure activity including if an aura is experienced  - Instruct patient/family to call for assistance with activity based on nursing assessment  - Administer anti-seizure medications if ordered    Outcome: Progressing  Goal: Achieves maximal functionality and self care  Description: INTERVENTIONS  - Monitor swallowing and airway patency with patient fatigue and changes in neurological status  - Encourage and assist patient to increase activity and self care     - Encourage visually impaired, hearing impaired and aphasic patients to use assistive/communication devices  Outcome: Progressing     Problem: MUSCULOSKELETAL - ADULT  Goal: Maintain or return mobility to safest level of function  Description: INTERVENTIONS:  - Assess patient's ability to carry out ADLs; assess patient's baseline for ADL function and identify physical deficits which impact ability to perform ADLs (bathing, care of mouth/teeth, toileting, grooming, dressing, etc )  - Assess/evaluate cause of self-care deficits   - Assess range of motion  - Assess patient's mobility  - Assess patient's need for assistive devices and provide as appropriate  - Encourage maximum independence but intervene and supervise when necessary  - Involve family in performance of ADLs  - Assess for home care needs following discharge   - Consider OT consult to assist with ADL evaluation and planning for discharge  - Provide patient education as appropriate  Outcome: Progressing  Goal: Maintain proper alignment of affected body part  Description: INTERVENTIONS:  - Support, maintain and protect limb and body alignment  - Provide patient/ family with appropriate education  Outcome: Progressing     Problem: Neurological Deficit  Goal: Neurological status is stable or improving  Description: Interventions:  - Monitor and assess patient's level of consciousness, motor function, sensory function, and level of assistance needed for ADLs  - Monitor and report changes from baseline  Collaborate with interdisciplinary team to initiate plan and implement interventions as ordered  - Provide and maintain a safe environment  - Consider seizure precautions  - Consider fall precautions  - Consider aspiration precautions  - Consider bleeding precautions  Outcome: Progressing     Problem: Activity Intolerance/Impaired Mobility  Goal: Mobility/activity is maintained at optimum level for patient  Description: Interventions:  - Assess and monitor patient  barriers to mobility and need for assistive/adaptive devices  - Assess patient's emotional response to limitations  - Collaborate with interdisciplinary team and initiate plans and interventions as ordered  - Encourage independent activity per ability   - Maintain proper body alignment  - Perform active/passive rom as tolerated/ordered  - Plan activities to conserve energy   - Turn patient as appropriate  Outcome: Progressing     Problem: Communication Impairment  Goal: Ability to express needs and understand communication  Description: Assess patient's communication skills and ability to understand information  Patient will demonstrate use of effective communication techniques, alternative methods of communication and understanding even if not able to speak       - Encourage communication and provide alternate methods of communication as needed  - Collaborate with case management/ for discharge needs  - Include patient/family/caregiver in decisions related to communication  Outcome: Progressing     Problem: Potential for Aspiration  Goal: Non-ventilated patient's risk of aspiration is minimized  Description: Assess and monitor vital signs, respiratory status, and labs (WBC)  Monitor for signs of aspiration (tachypnea, cough, rales, wheezing, cyanosis, fever)  - Assess and monitor patient's ability to swallow  - Place patient up in chair to eat if possible  - HOB up at 90 degrees to eat if unable to get patient up into chair   - Supervise patient during oral intake  - Instruct patient/ family to take small bites  - Instruct patient/ family to take small single sips when taking liquids  - Follow patient-specific strategies generated by speech pathologist   Outcome: Progressing     Problem: Nutrition  Goal: Nutrition/Hydration status is improving  Description: Monitor and assess patient's nutrition/hydration status for malnutrition (ex- brittle hair, bruises, dry skin, pale skin and conjunctiva, muscle wasting, smooth red tongue, and disorientation)  Collaborate with interdisciplinary team and initiate plan and interventions as ordered  Monitor patient's weight and dietary intake as ordered or per policy  Utilize nutrition screening tool and intervene per policy  Determine patient's food preferences and provide high-protein, high-caloric foods as appropriate  - Assist patient with eating   - Allow adequate time for meals   - Encourage patient to take dietary supplement as ordered  - Collaborate with clinical nutritionist   - Include patient/family/caregiver in decisions related to nutrition    Outcome: Progressing     Problem: Prexisting or High Potential for Compromised Skin Integrity  Goal: Skin integrity is maintained or improved  Description: INTERVENTIONS:  - Identify patients at risk for skin breakdown  - Assess and monitor skin integrity  - Assess and monitor nutrition and hydration status  - Monitor labs   - Assess for incontinence   - Turn and reposition patient  - Assist with mobility/ambulation  - Relieve pressure over bony prominences  - Avoid friction and shearing  - Provide appropriate hygiene as needed including keeping skin clean and dry  - Evaluate need for skin moisturizer/barrier cream  - Collaborate with interdisciplinary team   - Patient/family teaching  - Consider wound care consult   Outcome: Progressing     Problem: Potential for Falls  Goal: Patient will remain free of falls  Description: INTERVENTIONS:  - Educate patient/family on patient safety including physical limitations  - Instruct patient to call for assistance with activity   - Consult OT/PT to assist with strengthening/mobility   - Keep Call bell within reach  - Keep bed low and locked with side rails adjusted as appropriate  - Keep care items and personal belongings within reach  - Initiate and maintain comfort rounds  - Make Fall Risk Sign visible to staff  - Offer Toileting every 2 Hours, in advance of need  - Initiate/Maintain bed alarm  - Obtain necessary fall risk management equipment:   - Apply yellow socks and bracelet for high fall risk patients  - Consider moving patient to room near nurses station  Outcome: Progressing     Problem: Nutrition/Hydration-ADULT  Goal: Nutrient/Hydration intake appropriate for improving, restoring or maintaining nutritional needs  Description: Monitor and assess patient's nutrition/hydration status for malnutrition  Collaborate with interdisciplinary team and initiate plan and interventions as ordered  Monitor patient's weight and dietary intake as ordered or per policy  Utilize nutrition screening tool and intervene as necessary  Determine patient's food preferences and provide high-protein, high-caloric foods as appropriate       INTERVENTIONS:  - Monitor oral intake, urinary output, labs, and treatment plans  - Assess nutrition and hydration status and recommend course of action  - Evaluate amount of meals eaten  - Assist patient with eating if necessary   - Allow adequate time for meals  - Recommend/ encourage appropriate diets, oral nutritional supplements, and vitamin/mineral supplements  - Order, calculate, and assess calorie counts as needed  - Recommend, monitor, and adjust tube feedings and TPN/PPN based on assessed needs  - Assess need for intravenous fluids  - Provide specific nutrition/hydration education as appropriate  - Include patient/family/caregiver in decisions related to nutrition  Outcome: Progressing     Problem: SKIN/TISSUE INTEGRITY - ADULT  Goal: Incision(s), wounds(s) or drain site(s) healing without S/S of infection  Description: INTERVENTIONS  - Assess and document dressing, incision, wound bed, drain sites and surrounding tissue  - Provide patient and family education  - Perform skin care/dressing changes   Outcome: Progressing  Goal: Pressure injury heals and does not worsen  Description: Interventions:  - Implement low air loss mattress or specialty surface (Criteria met)  - Apply silicone foam dressing  - Instruct/assist with weight shifting every  minutes when in chair   - Limit chair time to 2 hour intervals  - Use special pressure reducing interventions such as  when in chair   - Apply fecal or urinary incontinence containment device   - Perform passive or active ROM every   - Turn and reposition patient & offload bony prominences every 2 hours   - Utilize friction reducing device or surface for transfers   - Consider consults to  interdisciplinary teams such as   - Use incontinent care products after each incontinent episode such as   - Consider nutrition services referral as needed  Outcome: Progressing

## 2022-05-07 PROCEDURE — 99232 SBSQ HOSP IP/OBS MODERATE 35: CPT | Performed by: STUDENT IN AN ORGANIZED HEALTH CARE EDUCATION/TRAINING PROGRAM

## 2022-05-07 RX ADMIN — Medication 250 MG: at 09:02

## 2022-05-07 RX ADMIN — TRAZODONE HYDROCHLORIDE 150 MG: 100 TABLET ORAL at 21:28

## 2022-05-07 RX ADMIN — HEPARIN SODIUM 5000 UNITS: 5000 INJECTION INTRAVENOUS; SUBCUTANEOUS at 05:29

## 2022-05-07 RX ADMIN — COLLAGENASE SANTYL: 250 OINTMENT TOPICAL at 09:06

## 2022-05-07 RX ADMIN — Medication 250 MG: at 16:48

## 2022-05-07 RX ADMIN — BUPRENORPHINE AND NALOXONE 8 MG: 8; 2 FILM BUCCAL; SUBLINGUAL at 09:02

## 2022-05-07 RX ADMIN — NICOTINE 7 MG/24 HR DAILY TRANSDERMAL PATCH 1 PATCH: at 09:04

## 2022-05-07 RX ADMIN — ASPIRIN 81 MG CHEWABLE TABLET 81 MG: 81 TABLET CHEWABLE at 09:02

## 2022-05-07 RX ADMIN — DULOXETINE HYDROCHLORIDE 120 MG: 60 CAPSULE, DELAYED RELEASE ORAL at 09:02

## 2022-05-07 RX ADMIN — TAMSULOSIN HYDROCHLORIDE 0.4 MG: 0.4 CAPSULE ORAL at 16:49

## 2022-05-07 RX ADMIN — LURASIDONE HYDROCHLORIDE 20 MG: 20 TABLET, FILM COATED ORAL at 21:28

## 2022-05-07 RX ADMIN — HEPARIN SODIUM 5000 UNITS: 5000 INJECTION INTRAVENOUS; SUBCUTANEOUS at 21:28

## 2022-05-07 RX ADMIN — ATORVASTATIN CALCIUM 40 MG: 40 TABLET, FILM COATED ORAL at 16:48

## 2022-05-07 RX ADMIN — LIDOCAINE 5% 1 PATCH: 700 PATCH TOPICAL at 09:03

## 2022-05-07 NOTE — PROGRESS NOTES
[address]  Progress Note - Justyna Oconnor 1960, 63 y o  male MRN: [id number]  Unit/Bed#: [facility] Encounter: [id number]  Primary Care Provider: Cass Brunner, MD   Date and time admitted to hospital: 4/25/2022 10:18 AM    Swelling of left upper extremity  Assessment & Plan  -Unknown etiology  -Patient does have history of left humeral fracture-and left upper extremity on triangular sling for immobilization  venous duplex of left upper extremity negative  -DC IV fluid    Empty sella turcica (HCC)  Assessment & Plan  -Incidental finding  -TSH normal, does not have any visual change  No electrolyte imbalance  No signs of symptoms of acromegaly,  -Patient will need outpatient follow-up with Neurology and Ophthalmology-as per Neurology recommendation    Goals of care, counseling/discussion  Assessment & Plan  -Discussion has done informed of patient's, with patient's son, and case management   -As per neuropsych, patient is incapable to make informed decision  Sacral decubitus ulcer, stage III (HCC)  Assessment & Plan  -Continue wound care  -Follow wound culture  -Wound culture shows E coli, Gram-positive rods, Gram-positive cocci-sensitive to Augmentin, started Augmentin for 7 days  Through 5/6  -wound care evaluation- addition of santyl daily left buttock, to replace maxorb Ag  - continue Hydrea carotid to bilateral sacrum, buttock and bilateral heels  - Q2 turns, offload heels, air cushion when out of bed    Opioid dependence (HCC)  Assessment & Plan  -chronic back pain, evidenced by daily use of Suboxone 8mg three times a day, requiring continued regime in hospital      -PT OT recommends rehab placement, but since patient is on Suboxone, unable to find place around this area-case management on board,      Left humeral fracture  Assessment & Plan  Patient had left humeral fracture diagnosed on 04/04/2022 on an ED visit    Still present on imaging  -Persistent medially displaced previously acute left humeral head and neck fracture  · Orthopedic recommendation appreciated:Patient may not lift more than 1 lb with the left upper extremity  · PT/OT pendulum exercises, active assisted range of motion, below shoulder height left upper extremity  PT OT recommends rehab placement, but since patient is on Suboxone, unable to find place around this area-case management on board,      Closed fracture of multiple ribs of left side with routine healing  Assessment & Plan  -CT chest and pelvis shows There are fractures of the left 6, 7th, 8th, and 9th ribs with reactive bone formation present suggesting that these fractures are subacute or less likely chronic   -Continue pain control  These do not appear to be acute fractures and hence continue supportive care  -PT OT recommends rehab placement, but since patient is on Suboxone, unable to find place around this area  -case management on board,  -patient adamant about going home with services, was evaluated by neuropsychiatry 4/28 in deemed not competent in making medical decisions at this point    Back pain  Assessment & Plan  -Patient is complaining of chronic back pain  With multiple surgeries   -Continue Suboxone as per home regimen starting  Patient is [facility] by addiction clinic [facility]    Note reviewed   -He is supposed to be on 8 mg 1 film sublingual t i d   - pt has recently reestablished with pain management, restarting suboxone 4/11 per PDMP review, prior to that last Rx 7/21'    Other hyperlipidemia  Assessment & Plan  -Continue statin therapy    Bipolar affective disorder ([facility] [address] )  Assessment & Plan  -Continue home regimen of Valium, Cymbalta and Latuda  -Psychiatry consult appreciated      * Toxic encephalopathy  Assessment & Plan  -Most likely secondary to Suboxone and Valium  -MRI of the brain also shows empty sella    -Mental status appears to have returned to baseline, will discuss with patient's family    -Resume home medications    -Neurology consult appreciated  -PT OT recommends to discharge patient on rehab-since patient is on Suboxone, which can affect the discharge planning and placement  Case management on board  - pt follow with Desiderio Carota Dr Wendi Buerger as of 2022  Assessment & Plan  Affecting left flank, left lateral chest, left upper thigh-remained stable  Resume heparin          VTE Pharmacologic Prophylaxis: VTE Score: 2 Low Risk (Score 0-2) - Encourage Ambulation  Patient Centered Rounds: I performed bedside rounds with nursing staff today  Discussions with Specialists or Other Care Team Provider: case management    Education and Discussions with Family / Patient: Attempted to update  (son) via phone  Unable to contact  Time Spent for Care: 30 minutes  More than 50% of total time spent on counseling and coordination of care as described above  Current Length of Stay: 12 day(s)  Current Patient Status: Inpatient   Certification Statement: The patient will continue to require additional inpatient hospital stay due to placement  Discharge Plan: When facility that accepts patients with suboxone is found    Code Status: Level 1 - Full Code    Subjective:   Had a lengthy conversation with Anette Santo this morning regarding how we got to this point  He told me that he spoke with Dr Chantell Nair ([address] [place] [address] outpatient psychiatrist) and Dr Chantell Nair refused to change another psychiatrist diagnosis  I explained to Anette Santo that I do not have the required training to make competency decisions as I trained in internal medicine thus I am not qualify to change another psychiatrist's diagnosis  Objective:     Vitals:   Temp (24hrs), Av 1 °F (36 7 °C), Min:97 8 °F (36 6 °C), Max:98 6 °F (37 °C)    Temp:  [97 8 °F (36 6 °C)-98 6 °F (37 °C)] 98 6 °F (37 °C)  HR:  [70-85] 85  Resp:  [18-20] 18  BP: (104-115)/(66-68) 104/66  SpO2:  [92 %-93 %] 93 %  Body mass index is 26 01 kg/m²       Input and Output Summary (last 24 hours): Intake/Output Summary (Last 24 hours) at 5/7/2022 1633  Last data filed at 5/7/2022 1419  Gross per 24 hour   Intake --   Output 2120 ml   Net -2120 ml       Physical Exam:   Physical Exam  Vitals reviewed  HENT:      Head: Normocephalic and atraumatic  Right Ear: External ear normal       Left Ear: External ear normal       Nose: Nose normal       Mouth/Throat:      Mouth: Mucous membranes are moist       Pharynx: Oropharynx is clear  Eyes:      Extraocular Movements: Extraocular movements intact  Cardiovascular:      Rate and Rhythm: Normal rate and regular rhythm  Pulses: Normal pulses  Heart sounds: Normal heart sounds  Pulmonary:      Effort: Pulmonary effort is normal       Breath sounds: Normal breath sounds  Abdominal:      General: Abdomen is flat  Palpations: Abdomen is soft  Tenderness: There is no abdominal tenderness  Musculoskeletal:         General: Normal range of motion  Cervical back: Normal range of motion  Skin:     General: Skin is warm and dry  Neurological:      General: No focal deficit present  Mental Status: He is alert  Mental status is at baseline  Psychiatric:         Attention and Perception: He is inattentive  Mood and Affect: Affect is labile  Speech: Speech normal          Behavior: Behavior is cooperative  Cognition and Memory: Memory is impaired  Judgment: Judgment is impulsive  Additional Data:     Labs:  Results from last 7 days   Lab Units 05/05/22 0551 05/04/22 0443 05/04/22 0443   WBC Thousand/uL 6 92   < > 4 59   HEMOGLOBIN g/dL 10 9*   < > 8 7*   HEMATOCRIT % 33 4*   < > 27 5*   PLATELETS Thousands/uL 405*   < > 342   NEUTROS PCT %  --   --  59   LYMPHS PCT %  --   --  23   MONOS PCT %  --   --  10   EOS PCT %  --   --  8*    < > = values in this interval not displayed       Results from last 7 days   Lab Units 05/05/22 0551 05/04/22  0443 05/04/22  0443 05/03/22  0538 05/03/22  0538   SODIUM mmol/L 138   < > 140   < > 141   POTASSIUM mmol/L 4 0   < > 3 9   < > 3 7   CHLORIDE mmol/L 102   < > 106   < > 106   CO2 mmol/L 32   < > 32   < > 30   BUN mg/dL 7   < > 6   < > 6   CREATININE mg/dL 0 73   < > 0 63   < > 0 64   ANION GAP mmol/L 4   < > 2*   < > 5   CALCIUM mg/dL 8 7   < > 7 8*   < > 7 7*   ALBUMIN g/dL  --   --  2 4*   < > 2 5*   TOTAL BILIRUBIN mg/dL  --   --   --   --  0 22   ALK PHOS U/L  --   --   --   --  99   ALT U/L  --   --   --   --  10*   AST U/L  --   --   --   --  18   GLUCOSE RANDOM mg/dL 111   < > 104   < > 101    < > = values in this interval not displayed  Results from last 7 days   Lab Units 05/03/22  0633   INR  0 97             Results from last 7 days   Lab Units 05/03/22  0538   PROCALCITONIN ng/ml <0 05       Lines/Drains:  Invasive Devices  Report    Peripheral Intravenous Line            Peripheral IV 05/05/22 Distal;Right;Upper;Ventral (anterior) Arm 2 days                      Imaging: No pertinent imaging reviewed      Recent Cultures (last 7 days):         Last 24 Hours Medication List:   Current Facility-Administered Medications   Medication Dose Route Frequency Provider Last Rate    acetaminophen  650 mg Oral Q6H PRN Ramirez Ochoa MD      aspirin  81 mg Oral Daily Ramirez Ochoa MD      atorvastatin  40 mg Oral QPM Ramirez Ochoa MD      buprenorphine-naloxone  8 mg Sublingual TID Ramirez Ochoa MD      calcium carbonate  1,000 mg Oral Daily PRN Ramirez Ochoa MD      collagenase   Topical Daily Olivier BleacherNINI      diazepam  5 mg Oral Q8H PRN Ramirez Ochoa MD      DULoxetine  120 mg Oral Daily Ramirez Ochoa MD      heparin (porcine)  5,000 Units Subcutaneous [facility] Anoop Hernandez MD      ibuprofen  600 mg Oral Q6H PRN Anoop Hernandez MD      lidocaine  1 patch Topical Daily Anoop Hernandez MD      lurasidone  20 mg Oral HS Ramirez Ochoa MD      nicotine  1 patch Transdermal Daily Ramirez Ochoa MD  ondansetron  4 mg Intravenous Q6H PRN Herbert Segovia MD      saccharomyces boulardii  250 mg Oral BID Brand MD Kathryn      tamsulosin  0 4 mg Oral Daily With Haleigh Baptiste MD      traZODone  150 mg Oral HS Herbert Segovia MD          Today, Patient Was Seen By: Donna Vang DO    **Please Note: This note may have been constructed using a voice recognition system  **

## 2022-05-07 NOTE — PLAN OF CARE
Problem: MOBILITY - ADULT  Goal: Maintain or return to baseline ADL function  Description: INTERVENTIONS:  -  Assess patient's ability to carry out ADLs; assess patient's baseline for ADL function and identify physical deficits which impact ability to perform ADLs (bathing, care of mouth/teeth, toileting, grooming, dressing, etc )  - Assess/evaluate cause of self-care deficits   - Assess range of motion  - Assess patient's mobility; develop plan if impaired  - Assess patient's need for assistive devices and provide as appropriate  - Encourage maximum independence but intervene and supervise when necessary  - Involve family in performance of ADLs  - Assess for home care needs following discharge   - Consider OT consult to assist with ADL evaluation and planning for discharge  - Provide patient education as appropriate  Outcome: Progressing  Goal: Maintains/Returns to pre admission functional level  Description: INTERVENTIONS:  - Perform BMAT or MOVE assessment daily    - Set and communicate daily mobility goal to care team and patient/family/caregiver     - Collaborate with rehabilitation services on mobility goals if consulted  - Out of bed for meals 3 times a day  - Out of bed for toileting  - Record patient progress and toleration of activity level   Outcome: Progressing     Problem: PAIN - ADULT  Goal: Verbalizes/displays adequate comfort level or baseline comfort level  Description: Interventions:  - Encourage patient to monitor pain and request assistance  - Assess pain using appropriate pain scale  - Administer analgesics based on type and severity of pain and evaluate response  - Implement non-pharmacological measures as appropriate and evaluate response  - Consider cultural and social influences on pain and pain management  - Notify physician/advanced practitioner if interventions unsuccessful or patient reports new pain  Outcome: Progressing     Problem: INFECTION - ADULT  Goal: Absence or prevention of progression during hospitalization  Description: INTERVENTIONS:  - Assess and monitor for signs and symptoms of infection  - Monitor lab/diagnostic results  - Monitor all insertion sites, i e  indwelling lines, tubes, and drains  - Monitor endotracheal if appropriate and nasal secretions for changes in amount and color  - [place] appropriate cooling/warming therapies per order  - Administer medications as ordered  - Instruct and encourage patient and family to use good hand hygiene technique  - Identify and instruct in appropriate isolation precautions for identified infection/condition  Outcome: Progressing     Problem: DISCHARGE PLANNING  Goal: Discharge to home or other facility with appropriate resources  Description: INTERVENTIONS:  - Identify barriers to discharge w/patient and caregiver  - Arrange for needed discharge resources and transportation as appropriate  - Identify discharge learning needs (meds, wound care, etc )  - Arrange for interpretive services to assist at discharge as needed  - Refer to Case Management Department for coordinating discharge planning if the patient needs post-hospital services based on physician/advanced practitioner order or complex needs related to functional status, cognitive ability, or social support system  Outcome: Progressing     Problem: Knowledge Deficit  Goal: Patient/family/caregiver demonstrates understanding of disease process, treatment plan, medications, and discharge instructions  Description: Complete learning assessment and assess knowledge base    Interventions:  - Provide teaching at level of understanding  - Provide teaching via preferred learning methods  Outcome: Progressing     Problem: NEUROSENSORY - ADULT  Goal: Achieves stable or improved neurological status  Description: INTERVENTIONS  - Monitor and report changes in neurological status  - Monitor vital signs such as temperature, blood pressure, glucose, and any other labs ordered   - Initiate measures to prevent increased intracranial pressure  - Monitor for seizure activity and implement precautions if appropriate      Outcome: Progressing  Goal: Remains free of injury related to seizures activity  Description: INTERVENTIONS  - Maintain airway, patient safety  and administer oxygen as ordered  - Monitor patient for seizure activity, document and report duration and description of seizure to physician/advanced practitioner  - If seizure occurs,  ensure patient safety during seizure  - Reorient patient post seizure  - Seizure pads on all 4 side rails  - Instruct patient/family to notify RN of any seizure activity including if an aura is experienced  - Instruct patient/family to call for assistance with activity based on nursing assessment  - Administer anti-seizure medications if ordered    Outcome: Progressing  Goal: Achieves maximal functionality and self care  Description: INTERVENTIONS  - Monitor swallowing and airway patency with patient fatigue and changes in neurological status  - Encourage and assist patient to increase activity and self care     - Encourage visually impaired, hearing impaired and aphasic patients to use assistive/communication devices  Outcome: Progressing     Problem: MUSCULOSKELETAL - ADULT  Goal: Maintain or return mobility to safest level of function  Description: INTERVENTIONS:  - Assess patient's ability to carry out ADLs; assess patient's baseline for ADL function and identify physical deficits which impact ability to perform ADLs (bathing, care of mouth/teeth, toileting, grooming, dressing, etc )  - Assess/evaluate cause of self-care deficits   - Assess range of motion  - Assess patient's mobility  - Assess patient's need for assistive devices and provide as appropriate  - Encourage maximum independence but intervene and supervise when necessary  - Involve family in performance of ADLs  - Assess for home care needs following discharge   - Consider OT consult to assist with ADL evaluation and planning for discharge  - Provide patient education as appropriate  Outcome: Progressing  Goal: Maintain proper alignment of affected body part  Description: INTERVENTIONS:  - Support, maintain and protect limb and body alignment  - Provide patient/ family with appropriate education  Outcome: Progressing     Problem: Neurological Deficit  Goal: Neurological status is stable or improving  Description: Interventions:  - Monitor and assess patient's level of consciousness, motor function, sensory function, and level of assistance needed for ADLs  - Monitor and report changes from baseline  Collaborate with interdisciplinary team to initiate plan and implement interventions as ordered  - Provide and maintain a safe environment  - Consider seizure precautions  - Consider fall precautions  - Consider aspiration precautions  - Consider bleeding precautions  Outcome: Progressing     Problem: Activity Intolerance/Impaired Mobility  Goal: Mobility/activity is maintained at optimum level for patient  Description: Interventions:  - Assess and monitor patient  barriers to mobility and need for assistive/adaptive devices  - Assess patient's emotional response to limitations  - Collaborate with interdisciplinary team and initiate plans and interventions as ordered  - Encourage independent activity per ability   - Maintain proper body alignment  - Perform active/passive rom as tolerated/ordered  - Plan activities to conserve energy   - Turn patient as appropriate  Outcome: Progressing     Problem: Communication Impairment  Goal: Ability to express needs and understand communication  Description: Assess patient's communication skills and ability to understand information  Patient will demonstrate use of effective communication techniques, alternative methods of communication and understanding even if not able to speak       - Encourage communication and provide alternate methods of communication as needed  - Collaborate with case management/ for discharge needs  - Include patient/family/caregiver in decisions related to communication  Outcome: Progressing     Problem: Potential for Aspiration  Goal: Non-ventilated patient's risk of aspiration is minimized  Description: Assess and monitor vital signs, respiratory status, and labs (WBC)  Monitor for signs of aspiration (tachypnea, cough, rales, wheezing, cyanosis, fever)  - Assess and monitor patient's ability to swallow  - Place patient up in chair to eat if possible  - HOB up at 90 degrees to eat if unable to get patient up into chair   - Supervise patient during oral intake  - Instruct patient/ family to take small bites  - Instruct patient/ family to take small single sips when taking liquids  - Follow patient-specific strategies generated by speech pathologist   Outcome: Progressing     Problem: Nutrition  Goal: Nutrition/Hydration status is improving  Description: Monitor and assess patient's nutrition/hydration status for malnutrition (ex- brittle hair, bruises, dry skin, pale skin and conjunctiva, muscle wasting, smooth red tongue, and disorientation)  Collaborate with interdisciplinary team and initiate plan and interventions as ordered  Monitor patient's weight and dietary intake as ordered or per policy  Utilize nutrition screening tool and intervene per policy  Determine patient's food preferences and provide high-protein, high-caloric foods as appropriate  - Assist patient with eating   - Allow adequate time for meals   - Encourage patient to take dietary supplement as ordered  - Collaborate with clinical nutritionist   - Include patient/family/caregiver in decisions related to nutrition    Outcome: Progressing     Problem: Prexisting or High Potential for Compromised Skin Integrity  Goal: Skin integrity is maintained or improved  Description: INTERVENTIONS:  - Identify patients at risk for skin breakdown  - Assess and monitor skin integrity  - Assess and monitor nutrition and hydration status  - Monitor labs   - Assess for incontinence   - Turn and reposition patient  - Assist with mobility/ambulation  - Relieve pressure over bony prominences  - Avoid friction and shearing  - Provide appropriate hygiene as needed including keeping skin clean and dry  - Evaluate need for skin moisturizer/barrier cream  - Collaborate with interdisciplinary team   - Patient/family teaching  - Consider wound care consult   Outcome: Progressing     Problem: Potential for Falls  Goal: Patient will remain free of falls  Description: INTERVENTIONS:  - Educate patient/family on patient safety including physical limitations  - Instruct patient to call for assistance with activity   - Consult OT/PT to assist with strengthening/mobility   - Keep Call bell within reach  - Keep bed low and locked with side rails adjusted as appropriate  - Keep care items and personal belongings within reach  - Initiate and maintain comfort rounds  - Make Fall Risk Sign visible to staff  - Offer Toileting every 2 Hours, in advance of need  - Initiate/Maintain bed and chair alarm  - Obtain necessary fall risk management equipment:   - Apply yellow socks and bracelet for high fall risk patients  - Consider moving patient to room near nurses station  Outcome: Progressing     Problem: Nutrition/Hydration-ADULT  Goal: Nutrient/Hydration intake appropriate for improving, restoring or maintaining nutritional needs  Description: Monitor and assess patient's nutrition/hydration status for malnutrition  Collaborate with interdisciplinary team and initiate plan and interventions as ordered  Monitor patient's weight and dietary intake as ordered or per policy  Utilize nutrition screening tool and intervene as necessary  Determine patient's food preferences and provide high-protein, high-caloric foods as appropriate       INTERVENTIONS:  - Monitor oral intake, urinary output, labs, and treatment plans  - Assess nutrition and hydration status and recommend course of action  - Evaluate amount of meals eaten  - Assist patient with eating if necessary   - Allow adequate time for meals  - Recommend/ encourage appropriate diets, oral nutritional supplements, and vitamin/mineral supplements  - Order, calculate, and assess calorie counts as needed  - Recommend, monitor, and adjust tube feedings and TPN/PPN based on assessed needs  - Assess need for intravenous fluids  - Provide specific nutrition/hydration education as appropriate  - Include patient/family/caregiver in decisions related to nutrition  Outcome: Progressing     Problem: SKIN/TISSUE INTEGRITY - ADULT  Goal: Incision(s), wounds(s) or drain site(s) healing without S/S of infection  Description: INTERVENTIONS  - Assess and document dressing, incision, wound bed, drain sites and surrounding tissue  - Provide patient and family education  - Perform skin care/dressing changes   Outcome: Progressing  Goal: Pressure injury heals and does not worsen  Description: Interventions:  - Implement low air loss mattress or specialty surface (Criteria met)  - Apply silicone foam dressing  - Instruct/assist with weight shifting every  minutes when in chair   - Limit chair time to  hour intervals  - Use special pressure reducing interventions such as  when in chair   - Apply fecal or urinary incontinence containment device   - Perform passive or active ROM every   - Turn and reposition patient & offload bony prominences every  hours   - Utilize friction reducing device or surface for transfers   - Consider consults to  interdisciplinary teams such as   - Use incontinent care products after each incontinent episode such as   - Consider nutrition services referral as needed  Outcome: Progressing

## 2022-05-07 NOTE — ASSESSMENT & PLAN NOTE
Affecting left flank, left lateral chest, left upper thigh-remained stable  Resume heparin Rhombic Flap Text: The defect edges were debeveled with a #15 scalpel blade.  Given the location of the defect and the proximity to free margins a rhombic flap was deemed most appropriate.  Using a sterile surgical marker, an appropriate rhombic flap was drawn incorporating the defect.    The area thus outlined was incised deep to adipose tissue with a #15 scalpel blade.  The skin margins were undermined to an appropriate distance in all directions utilizing iris scissors.

## 2022-05-07 NOTE — ASSESSMENT & PLAN NOTE
-CT chest and pelvis shows There are fractures of the left 6, 7th, 8th, and 9th ribs with reactive bone formation present suggesting that these fractures are subacute or less likely chronic   -Continue pain control    These do not appear to be acute fractures and hence continue supportive care  -PT OT recommends rehab placement, but since patient is on Suboxone, unable to find place around this area  -case management on board,  -patient adamant about going home with services, was evaluated by neuropsychiatry 4/28 in deemed not competent in making medical decisions at this point

## 2022-05-07 NOTE — ASSESSMENT & PLAN NOTE
-Most likely secondary to Suboxone and Valium  -MRI of the brain also shows empty sella    -Mental status appears to have returned to baseline, will discuss with patient's family    -Resume home medications    -Neurology consult appreciated  -PT OT recommends to discharge patient on rehab-since patient is on Suboxone, which can affect the discharge planning and placement    Case management on board  - pt follow with Lauri Lerner

## 2022-05-07 NOTE — ASSESSMENT & PLAN NOTE
-Patient is complaining of chronic back pain  With multiple surgeries   -Continue Suboxone as per home regimen starting  Patient is seen by addiction clinic [facility]    Note reviewed   -He is supposed to be on 8 mg 1 film sublingual t i d   - pt has recently reestablished with pain management, restarting suboxone 4/11 per PDMP review, prior to that last Rx 7/21'

## 2022-05-07 NOTE — PLAN OF CARE
Problem: PAIN - ADULT  Goal: Verbalizes/displays adequate comfort level or baseline comfort level  Description: Interventions:  - Encourage patient to monitor pain and request assistance  - Assess pain using appropriate pain scale  - Administer analgesics based on type and severity of pain and evaluate response  - Implement non-pharmacological measures as appropriate and evaluate response  - Consider cultural and social influences on pain and pain management  - Notify physician/advanced practitioner if interventions unsuccessful or patient reports new pain  Outcome: Progressing     Problem: MOBILITY - ADULT  Goal: Maintain or return to baseline ADL function  Description: INTERVENTIONS:  -  Assess patient's ability to carry out ADLs; assess patient's baseline for ADL function and identify physical deficits which impact ability to perform ADLs (bathing, care of mouth/teeth, toileting, grooming, dressing, etc )  - Assess/evaluate cause of self-care deficits   - Assess range of motion  - Assess patient's mobility; develop plan if impaired  - Assess patient's need for assistive devices and provide as appropriate  - Encourage maximum independence but intervene and supervise when necessary  - Involve family in performance of ADLs  - Assess for home care needs following discharge   - Consider OT consult to assist with ADL evaluation and planning for discharge  - Provide patient education as appropriate  Outcome: Progressing  Goal: Maintains/Returns to pre admission functional level  Description: INTERVENTIONS:  - Perform BMAT or MOVE assessment daily    - Set and communicate daily mobility goal to care team and patient/family/caregiver     - Collaborate with rehabilitation services on mobility goals if consulted  - Out of bed for meals 3 times a day  - Out of bed for toileting  - Record patient progress and toleration of activity level   Outcome: Progressing

## 2022-05-08 PROCEDURE — 99233 SBSQ HOSP IP/OBS HIGH 50: CPT | Performed by: STUDENT IN AN ORGANIZED HEALTH CARE EDUCATION/TRAINING PROGRAM

## 2022-05-08 RX ORDER — ALPRAZOLAM 0.5 MG/1
0.5 TABLET ORAL DAILY
Status: DISCONTINUED | OUTPATIENT
Start: 2022-05-08 | End: 2022-05-09 | Stop reason: HOSPADM

## 2022-05-08 RX ORDER — ACETAMINOPHEN 325 MG/1
650 TABLET ORAL EVERY 6 HOURS
Status: DISCONTINUED | OUTPATIENT
Start: 2022-05-08 | End: 2022-05-09 | Stop reason: HOSPADM

## 2022-05-08 RX ORDER — TRAZODONE HYDROCHLORIDE 50 MG/1
50 TABLET ORAL
Status: DISCONTINUED | OUTPATIENT
Start: 2022-05-08 | End: 2022-05-08

## 2022-05-08 RX ORDER — DIAZEPAM 5 MG/1
5 TABLET ORAL
Status: DISCONTINUED | OUTPATIENT
Start: 2022-05-08 | End: 2022-05-09 | Stop reason: HOSPADM

## 2022-05-08 RX ORDER — DIAZEPAM 2 MG/1
2 TABLET ORAL
Status: DISCONTINUED | OUTPATIENT
Start: 2022-05-08 | End: 2022-05-08

## 2022-05-08 RX ADMIN — IBUPROFEN 600 MG: 600 TABLET ORAL at 09:07

## 2022-05-08 RX ADMIN — ALPRAZOLAM 0.5 MG: 0.5 TABLET ORAL at 17:25

## 2022-05-08 RX ADMIN — ACETAMINOPHEN 650 MG: 325 TABLET ORAL at 21:46

## 2022-05-08 RX ADMIN — Medication 250 MG: at 09:07

## 2022-05-08 RX ADMIN — HEPARIN SODIUM 5000 UNITS: 5000 INJECTION INTRAVENOUS; SUBCUTANEOUS at 14:56

## 2022-05-08 RX ADMIN — ASPIRIN 81 MG CHEWABLE TABLET 81 MG: 81 TABLET CHEWABLE at 09:07

## 2022-05-08 RX ADMIN — LIDOCAINE 5% 1 PATCH: 700 PATCH TOPICAL at 09:09

## 2022-05-08 RX ADMIN — HEPARIN SODIUM 5000 UNITS: 5000 INJECTION INTRAVENOUS; SUBCUTANEOUS at 05:00

## 2022-05-08 RX ADMIN — TAMSULOSIN HYDROCHLORIDE 0.4 MG: 0.4 CAPSULE ORAL at 17:18

## 2022-05-08 RX ADMIN — LIDOCAINE 5% 1 PATCH: 700 PATCH TOPICAL at 09:08

## 2022-05-08 RX ADMIN — HEPARIN SODIUM 5000 UNITS: 5000 INJECTION INTRAVENOUS; SUBCUTANEOUS at 21:46

## 2022-05-08 RX ADMIN — NICOTINE 7 MG/24 HR DAILY TRANSDERMAL PATCH 1 PATCH: at 09:09

## 2022-05-08 RX ADMIN — TRAZODONE HYDROCHLORIDE 150 MG: 100 TABLET ORAL at 21:46

## 2022-05-08 RX ADMIN — DULOXETINE HYDROCHLORIDE 120 MG: 60 CAPSULE, DELAYED RELEASE ORAL at 09:07

## 2022-05-08 RX ADMIN — COLLAGENASE SANTYL: 250 OINTMENT TOPICAL at 13:51

## 2022-05-08 RX ADMIN — DIAZEPAM 5 MG: 5 TABLET ORAL at 21:47

## 2022-05-08 RX ADMIN — ATORVASTATIN CALCIUM 40 MG: 40 TABLET, FILM COATED ORAL at 17:18

## 2022-05-08 RX ADMIN — LURASIDONE HYDROCHLORIDE 20 MG: 20 TABLET, FILM COATED ORAL at 21:49

## 2022-05-08 RX ADMIN — Medication 250 MG: at 17:19

## 2022-05-08 RX ADMIN — DIAZEPAM 5 MG: 5 TABLET ORAL at 09:07

## 2022-05-08 RX ADMIN — ACETAMINOPHEN 650 MG: 325 TABLET ORAL at 17:00

## 2022-05-08 NOTE — PROGRESS NOTES
[address]  Progress Note - Ovidio Ward 1960, 63 y o  male MRN: [id number]  Unit/Bed#: -[facility] Encounter: [id number]  Primary Care Provider: Collette Arenas MD   Date and time admitted to hospital: 4/25/2022 10:18 AM    * Toxic encephalopathy  Assessment & Plan  -Most likely secondary to Suboxone and Valium  -MRI of the brain also shows empty sella  -Mental status appears to have returned to baseline, will discuss with patient's family  -Resume home medications    -Neurology consult appreciated  -PT OT recommends to discharge patient on rehab-since patient is on Suboxone, which can affect the discharge planning and placement  Case management on board  - pt follow with Kody Perez Kin    Had a lengthy conversation with Jimmy June today, we are planning to re-evaluate him by neuropsychiatry for competency tomorrow morning  Goals of care, counseling/discussion  Assessment & Plan  Had a lengthy conversation with Jimmy June today  We discussed his goals and obviously patient wants to go home independently  Discussed his relationship with his psychiatrist and a why he has been seeing him for all these years  Patient states that he has been seeing Dr Radha Mcdermott for depression due to multiple back surgeries causing him pain  I asked if he has ever been diagnosed with any other psychiatric problems such as bipolar which patient reluctantly disagreed with the diagnosis but has been told he has bipolar  Discussed in length how now days everyone is on antidepressants and that there is no stigma behind mental psychiatric diseases these days  Also discussed patient's medication regimen and pain level  Patient states that he is feeling better and he really does not have any pain  He has refused to take Suboxone for the last 24 hours and states that he is not having any withdrawal symptoms  Will discontinue Suboxone altogether    In regards to keeping patient's pain under control will change Tylenol to standing doses as his LFTs have normalized  Will continue his Cymbalta  Will change the p r n  Valium to 5 mg q h s  To help with possible muscle spasms at night and with sleep  Will continue the trazodone 150 mg q h s  I believe changing the p r n  Valium to standing q h s  Will help patient's mentation in the morning as he states that during the morning he feels very foggy and it appears he's been getting a morning dose almost every day  We discussed having him be revaluated by neuropsych tomorrow morning for competency which patient is agreeable to  Patient states that he feels that he might get really nervous during the interview  I told him that I will give him 0 5 mg xanax in the morning to help him be calm during morning hours  Will consult neuropsych tomorrow morning  Swelling of left upper extremity  Assessment & Plan    -Patient does have history of left humeral fracture-and left upper extremity on triangular sling for immobilization  venous duplex of left upper extremity negative  -DC IV fluid    Empty sella turcica (HCC)  Assessment & Plan  -Incidental finding  -TSH normal, does not have any visual change  No electrolyte imbalance  No signs of symptoms of acromegaly,  -Patient will need outpatient follow-up with Neurology and Ophthalmology-as per Neurology recommendation    Sacral decubitus ulcer, stage III ([address] )  Assessment & Plan  -Continue wound care  -Follow wound culture  -Wound culture shows E coli, Gram-positive rods, Gram-positive cocci-sensitive to Augmentin, started Augmentin for 7 days   Through 5/6  -wound care evaluation- addition of santyl daily left buttock, to replace maxorb Ag  - continue Hydrea carotid to bilateral sacrum, buttock and bilateral heels  - Q2 turns, offload heels, air cushion when out of bed    Opioid dependence (HCC)  Assessment & Plan  -chronic back pain, evidenced by daily use of Suboxone 8mg three times a day, requiring continued regime in hospital    -PT OT recommends rehab placement, but since patient is on Suboxone, unable to find place around this area-case management on board,    -patient refuse Suboxone for last 24 hours no withdrawal symptoms will discontinue altogether patient agrees      Left humeral fracture  Assessment & Plan  Patient had left humeral fracture diagnosed on 04/04/2022 on an ED visit  Still present on imaging  -Persistent medially displaced previously acute left humeral head and neck fracture  · Orthopedic recommendation appreciated:Patient may not lift more than 1 lb with the left upper extremity  · PT/OT pendulum exercises, active assisted range of motion, below shoulder height left upper extremity  PT OT recommends rehab placement, but since patient is on Suboxone, unable to find place around this area-case management on board,      Closed fracture of multiple ribs of left side with routine healing  Assessment & Plan  -CT chest and pelvis shows There are fractures of the left 6, 7th, 8th, and 9th ribs with reactive bone formation present suggesting that these fractures are subacute or less likely chronic   -Continue pain control  These do not appear to be acute fractures and hence continue supportive care  -PT OT recommends rehab placement, but since patient is on Suboxone, unable to find place around this area  -case management on board,  -patient adamant about going home with services, was evaluated by neuropsychiatry 4/28 in deemed not competent in making medical decisions at this point    Back pain  Assessment & Plan  -Patient is complaining of chronic back pain  With multiple surgeries    - pt has recently reestablished with pain management, restarting suboxone 4/11 per PDMP review, prior to that last Rx 7/21'  -Suboxone discontinued as patient has refused day for last 24 hours without withdrawal symptoms    Other hyperlipidemia  Assessment & Plan  -Continue statin therapy    Bipolar affective disorder [facility])  Assessment & Plan  -Continue home regimen of Valium, Cymbalta and Latuda  -Psychiatry consult appreciated    -medication changes made see goals of care counseling            VTE Pharmacologic Prophylaxis: VTE Score: 2 Low Risk (Score 0-2) - Encourage Ambulation  Patient Centered Rounds: I performed bedside rounds with nursing staff today  Discussions with Specialists or Other Care Team Provider: case management    Education and Discussions with Family / Patient: Patient declined call to   Time Spent for Care: 60 minutes  More than 50% of total time spent on counseling and coordination of care as described above  Current Length of Stay: 13 day(s)  Current Patient Status: Inpatient   Certification Statement: The patient will continue to require additional inpatient hospital stay due to placement  Discharge Plan: To be determined    Code Status: Level 1 - Full Code    Subjective:   Patient seen examined at bedside  Had lengthy conversation with him see goals of care counseling  Objective:     Vitals:   Temp (24hrs), Av °F (36 7 °C), Min:97 9 °F (36 6 °C), Max:98 °F (36 7 °C)    Temp:  [97 9 °F (36 6 °C)-98 °F (36 7 °C)] 98 °F (36 7 °C)  HR:  [69-85] 85  Resp:  [16-21] 21  BP: (110-124)/(61-78) 110/61  SpO2:  [94 %-96 %] 94 %  Body mass index is 26 01 kg/m²  Input and Output Summary (last 24 hours): Intake/Output Summary (Last 24 hours) at 2022 1613  Last data filed at 2022 1401  Gross per 24 hour   Intake 720 ml   Output 2125 ml   Net -1405 ml       Physical Exam:   Physical Exam  Vitals reviewed  HENT:      Head: Normocephalic and atraumatic  Right Ear: External ear normal       Left Ear: External ear normal       Nose: Nose normal       Mouth/Throat:      Mouth: Mucous membranes are moist       Pharynx: Oropharynx is clear  Eyes:      Extraocular Movements: Extraocular movements intact     Cardiovascular:      Rate and Rhythm: Normal rate and regular rhythm  Pulses: Normal pulses  Heart sounds: Normal heart sounds  Pulmonary:      Effort: Pulmonary effort is normal       Breath sounds: Normal breath sounds  Abdominal:      General: Abdomen is flat  Palpations: Abdomen is soft  Tenderness: There is no abdominal tenderness  Musculoskeletal:         General: Normal range of motion  Cervical back: Normal range of motion  Right lower leg: No edema  Left lower leg: No edema  Skin:     General: Skin is warm and dry  Neurological:      Mental Status: He is alert  Mental status is at baseline  Psychiatric:         Mood and Affect: Mood is anxious  Speech: Speech is tangential           Additional Data:     Labs:  Results from last 7 days   Lab Units 05/05/22 0551 05/04/22 0443 05/04/22 0443   WBC Thousand/uL 6 92   < > 4 59   HEMOGLOBIN g/dL 10 9*   < > 8 7*   HEMATOCRIT % 33 4*   < > 27 5*   PLATELETS Thousands/uL 405*   < > 342   NEUTROS PCT %  --   --  59   LYMPHS PCT %  --   --  23   MONOS PCT %  --   --  10   EOS PCT %  --   --  8*    < > = values in this interval not displayed  Results from last 7 days   Lab Units 05/05/22 0551 05/04/22 0443 05/04/22 0443 05/03/22  0538 05/03/22  0538   SODIUM mmol/L 138   < > 140   < > 141   POTASSIUM mmol/L 4 0   < > 3 9   < > 3 7   CHLORIDE mmol/L 102   < > 106   < > 106   CO2 mmol/L 32   < > 32   < > 30   BUN mg/dL 7   < > 6   < > 6   CREATININE mg/dL 0 73   < > 0 63   < > 0 64   ANION GAP mmol/L 4   < > 2*   < > 5   CALCIUM mg/dL 8 7   < > 7 8*   < > 7 7*   ALBUMIN g/dL  --   --  2 4*   < > 2 5*   TOTAL BILIRUBIN mg/dL  --   --   --   --  0 22   ALK PHOS U/L  --   --   --   --  99   ALT U/L  --   --   --   --  10*   AST U/L  --   --   --   --  18   GLUCOSE RANDOM mg/dL 111   < > 104   < > 101    < > = values in this interval not displayed       Results from last 7 days   Lab Units 05/03/22  0633   INR  0 97             Results from last 7 days   Lab Units 05/03/22  0538   PROCALCITONIN ng/ml <0 05       Lines/Drains:  Invasive Devices  Report    Peripheral Intravenous Line            Peripheral IV 05/05/22 Distal;Right;Upper;Ventral (anterior) Arm 3 days                      Imaging: No pertinent imaging reviewed  Recent Cultures (last 7 days):         Last 24 Hours Medication List:   Current Facility-Administered Medications   Medication Dose Route Frequency Provider Last Rate    acetaminophen  650 mg Oral Q6H Erika Viveros DO      ALPRAZolam  0 5 mg Oral Daily Erika Viveros DO      aspirin  81 mg Oral Daily Josee Schneider MD      atorvastatin  40 mg Oral QPM Josee Schneider MD      calcium carbonate  1,000 mg Oral Daily PRN Josee Schneider MD      collagenase   Topical Daily NINI Asher      diazepam  5 mg Oral HS Erika Viveros DO      DULoxetine  120 mg Oral Daily Josee Schneider MD      heparin (porcine)  5,000 Units Subcutaneous formerly [facility] Percy Lancaster MD      ibuprofen  600 mg Oral Q6H PRN Percy Lancaster MD      lidocaine  1 patch Topical Daily Percy Lancaster MD      lurasidone  20 mg Oral HS Josee Schneider MD      nicotine  1 patch Transdermal Daily Josee Schneider MD      ondansetron  4 mg Intravenous Q6H PRN Josee Schneider MD      saccharomyces boulardii  250 mg Oral BID Percy Lancaster MD      tamsulosin  0 4 mg Oral Daily With Dinner Josee Schneider MD      traZODone  150 mg Oral HS Erika Viveros DO          Today, Patient Was Seen By: Sandra Rojas DO    **Please Note: This note may have been constructed using a voice recognition system  **

## 2022-05-08 NOTE — ASSESSMENT & PLAN NOTE
-Patient does have history of left humeral fracture-and left upper extremity on triangular sling for immobilization  venous duplex of left upper extremity negative  -DC IV fluid

## 2022-05-08 NOTE — ASSESSMENT & PLAN NOTE
-Continue home regimen of Valium, Cymbalta and Latuda  -Psychiatry consult appreciated    -medication changes made see goals of care counseling

## 2022-05-08 NOTE — ASSESSMENT & PLAN NOTE
-Patient is complaining of chronic back pain  With multiple surgeries    - pt has recently reestablished with pain management, restarting suboxone 4/11 per PDMP review, prior to that last Rx 7/21'  -Suboxone discontinued as patient has refused day for last 24 hours without withdrawal symptoms

## 2022-05-08 NOTE — ASSESSMENT & PLAN NOTE
Had a lengthy conversation with Brigida Resendez today  We discussed his goals and obviously patient wants to go home independently  Discussed his relationship with his psychiatrist and a why he has been seeing him for all these years  Patient states that he has been seeing Dr William Lopez for depression due to multiple back surgeries causing him pain  I asked if he has ever been diagnosed with any other psychiatric problems such as bipolar which patient reluctantly disagreed with the diagnosis but has been told he has bipolar  Discussed in length how now days everyone is on antidepressants and that there is no stigma behind mental psychiatric diseases these days  Also discussed patient's medication regimen and pain level  Patient states that he is feeling better and he really does not have any pain  He has refused to take Suboxone for the last 24 hours and states that he is not having any withdrawal symptoms  Will discontinue Suboxone altogether  In regards to keeping patient's pain under control will change Tylenol to standing doses as his LFTs have normalized  Will continue his Cymbalta  Will change the p r n  Valium to 5 mg q h s  To help with possible muscle spasms at night and with sleep  Will continue the trazodone 150 mg q h s  I believe changing the p r n  Valium to standing q h s  Will help patient's mentation in the morning as he states that during the morning he feels very foggy  We discussed having him be revaluated by neuropsych tomorrow morning for competency which patient is agreeable to  Patient states that he feels that he might get really nervous during the interview  I told him that I will give him 0 5 mg xanax in the morning to help him be calm during morning hours  Will consult neuropsych tomorrow morning

## 2022-05-08 NOTE — NURSING NOTE
Pt didn't want to get up to chair at this time or wash up, was to worried about things outside of hospital and his son

## 2022-05-08 NOTE — ASSESSMENT & PLAN NOTE
-chronic back pain, evidenced by daily use of Suboxone 8mg three times a day, requiring continued regime in hospital    -PT OT recommends rehab placement, but since patient is on Suboxone, unable to find place around this area-case management on board,    -patient refuse Suboxone for last 24 hours no withdrawal symptoms will discontinue altogether patient agrees

## 2022-05-08 NOTE — ASSESSMENT & PLAN NOTE
-Most likely secondary to Suboxone and Valium  -MRI of the brain also shows empty sella  -Mental status appears to have returned to baseline, will discuss with patient's family  -Resume home medications    -Neurology consult appreciated  -PT OT recommends to discharge patient on rehab-since patient is on Suboxone, which can affect the discharge planning and placement  Case management on board  - pt follow with Kody Perez Kin    Had a lengthy conversation with Jimmy June today, we are planning to re-evaluate him by neuropsychiatry for competency tomorrow morning

## 2022-05-09 VITALS
DIASTOLIC BLOOD PRESSURE: 80 MMHG | SYSTOLIC BLOOD PRESSURE: 136 MMHG | OXYGEN SATURATION: 89 % | HEART RATE: 107 BPM | TEMPERATURE: 98.2 F | RESPIRATION RATE: 17 BRPM | BODY MASS INDEX: 25.98 KG/M2 | WEIGHT: 191.8 LBS | HEIGHT: 72 IN

## 2022-05-09 PROCEDURE — 97116 GAIT TRAINING THERAPY: CPT

## 2022-05-09 PROCEDURE — 97530 THERAPEUTIC ACTIVITIES: CPT

## 2022-05-09 PROCEDURE — 99239 HOSP IP/OBS DSCHRG MGMT >30: CPT | Performed by: STUDENT IN AN ORGANIZED HEALTH CARE EDUCATION/TRAINING PROGRAM

## 2022-05-09 PROCEDURE — 99232 SBSQ HOSP IP/OBS MODERATE 35: CPT | Performed by: STUDENT IN AN ORGANIZED HEALTH CARE EDUCATION/TRAINING PROGRAM

## 2022-05-09 RX ORDER — ASPIRIN 81 MG/1
81 TABLET, CHEWABLE ORAL DAILY
Qty: 30 TABLET | Refills: 0 | Status: SHIPPED | OUTPATIENT
Start: 2022-05-10 | End: 2022-06-14

## 2022-05-09 RX ORDER — LIDOCAINE 50 MG/G
1 PATCH TOPICAL DAILY
Qty: 10 PATCH | Refills: 0 | Status: SHIPPED | OUTPATIENT
Start: 2022-05-10 | End: 2022-05-26

## 2022-05-09 RX ORDER — DIAZEPAM 5 MG/1
5 TABLET ORAL
Qty: 10 TABLET | Refills: 0 | Status: SHIPPED | OUTPATIENT
Start: 2022-05-09 | End: 2022-06-14

## 2022-05-09 RX ORDER — ALPRAZOLAM 0.5 MG/1
0.5 TABLET ORAL DAILY
Qty: 10 TABLET | Refills: 0 | Status: SHIPPED | OUTPATIENT
Start: 2022-05-10 | End: 2022-05-20

## 2022-05-09 RX ADMIN — ACETAMINOPHEN 650 MG: 325 TABLET ORAL at 16:22

## 2022-05-09 RX ADMIN — ATORVASTATIN CALCIUM 40 MG: 40 TABLET, FILM COATED ORAL at 16:24

## 2022-05-09 RX ADMIN — ALPRAZOLAM 0.5 MG: 0.5 TABLET ORAL at 09:09

## 2022-05-09 RX ADMIN — HEPARIN SODIUM 5000 UNITS: 5000 INJECTION INTRAVENOUS; SUBCUTANEOUS at 13:38

## 2022-05-09 RX ADMIN — ASPIRIN 81 MG CHEWABLE TABLET 81 MG: 81 TABLET CHEWABLE at 09:10

## 2022-05-09 RX ADMIN — DULOXETINE HYDROCHLORIDE 120 MG: 60 CAPSULE, DELAYED RELEASE ORAL at 09:10

## 2022-05-09 RX ADMIN — TAMSULOSIN HYDROCHLORIDE 0.4 MG: 0.4 CAPSULE ORAL at 16:22

## 2022-05-09 RX ADMIN — ACETAMINOPHEN 650 MG: 325 TABLET ORAL at 09:11

## 2022-05-09 RX ADMIN — COLLAGENASE SANTYL: 250 OINTMENT TOPICAL at 09:11

## 2022-05-09 RX ADMIN — ACETAMINOPHEN 650 MG: 325 TABLET ORAL at 05:11

## 2022-05-09 RX ADMIN — Medication 250 MG: at 09:10

## 2022-05-09 RX ADMIN — LIDOCAINE 5% 1 PATCH: 700 PATCH TOPICAL at 09:10

## 2022-05-09 RX ADMIN — HEPARIN SODIUM 5000 UNITS: 5000 INJECTION INTRAVENOUS; SUBCUTANEOUS at 05:11

## 2022-05-09 RX ADMIN — Medication 250 MG: at 16:22

## 2022-05-09 RX ADMIN — NICOTINE 7 MG/24 HR DAILY TRANSDERMAL PATCH 1 PATCH: at 09:10

## 2022-05-09 NOTE — QUICK NOTE
Patient participated in Neuropsychological Exam and appears to have capacity to make informed medical decisions  Full consult to follow

## 2022-05-09 NOTE — ASSESSMENT & PLAN NOTE
5/8 Had a lengthy conversation with Sophia Nickerson today  We discussed his goals and obviously patient wants to go home independently  Discussed his relationship with his psychiatrist and a why he has been seeing him for all these years  Patient states that he has been seeing Dr David Greer for depression due to multiple back surgeries causing him pain  I asked if he has ever been diagnosed with any other psychiatric problems such as bipolar which patient reluctantly disagreed with the diagnosis but has been told he has bipolar  Discussed in length how now days everyone is on antidepressants and that there is no stigma behind mental psychiatric diseases these days  Also discussed patient's medication regimen and pain level  Patient states that he is feeling better and he really does not have any pain  He has refused to take Suboxone for the last 24 hours and states that he is not having any withdrawal symptoms  Will discontinue Suboxone altogether  In regards to keeping patient's pain under control will change Tylenol to standing doses as his LFTs have normalized  Will continue his Cymbalta  Will change the p r n  Valium to 5 mg q h s  To help with possible muscle spasms at night and with sleep  Will continue the trazodone 150 mg q h s  I believe changing the p r n  Valium to standing q h s  Will help patient's mentation in the morning as he states that during the morning he feels very foggy  We discussed having him be revaluated by neuropsych tomorrow morning for competency which patient is agreeable to  Patient states that he feels that he might get really nervous during the interview  I told him that I will give him 0 5 mg xanax in the morning to help him be calm during morning hours  5/9 Neuropsych consulted for capacity evaluation - Deemed competent will d/c home

## 2022-05-09 NOTE — ASSESSMENT & PLAN NOTE
-Most likely secondary to Suboxone and Valium  -MRI of the brain also shows empty sella  -Mental status appears to have returned to baseline, will discuss with patient's family  -Resume home medications    -Neurology consult appreciated  -PT OT recommends to discharge patient on rehab-since patient is on Suboxone, which can affect the discharge planning and placement  Case management on board  - pt follow with Sima Grant to re-evaluate by neuropsychiatry for competency today

## 2022-05-09 NOTE — PLAN OF CARE
Problem: MOBILITY - ADULT  Goal: Maintain or return to baseline ADL function  Description: INTERVENTIONS:  -  Assess patient's ability to carry out ADLs; assess patient's baseline for ADL function and identify physical deficits which impact ability to perform ADLs (bathing, care of mouth/teeth, toileting, grooming, dressing, etc )  - Assess/evaluate cause of self-care deficits   - Assess range of motion  - Assess patient's mobility; develop plan if impaired  - Assess patient's need for assistive devices and provide as appropriate  - Encourage maximum independence but intervene and supervise when necessary  - Involve family in performance of ADLs  - Assess for home care needs following discharge   - Consider OT consult to assist with ADL evaluation and planning for discharge  - Provide patient education as appropriate  5/9/2022 1628 by Luisito Barnhart RN  Outcome: Adequate for Discharge  5/9/2022 1409 by Luisito Barnhart RN  Outcome: Progressing  5/9/2022 0958 by Luisito Barnhart RN  Outcome: Progressing  Goal: Maintains/Returns to pre admission functional level  Description: INTERVENTIONS:  - Perform BMAT or MOVE assessment daily    - Set and communicate daily mobility goal to care team and patient/family/caregiver     - Collaborate with rehabilitation services on mobility goals if consulted  - Out of bed for meals 3 times a day  - Out of bed for toileting  - Record patient progress and toleration of activity level   5/9/2022 1628 by Luisito Barnhart RN  Outcome: Adequate for Discharge  5/9/2022 1409 by Luisito Barnhart RN  Outcome: Progressing  5/9/2022 0958 by Luisito Barnhart RN  Outcome: Progressing     Problem: PAIN - ADULT  Goal: Verbalizes/displays adequate comfort level or baseline comfort level  Description: Interventions:  - Encourage patient to monitor pain and request assistance  - Assess pain using appropriate pain scale0-10  - Administer analgesics based on type and severity of pain and evaluate response  - Implement non-pharmacological measures as appropriate and evaluate response  - Consider cultural and social influences on pain and pain management  - Notify physician/advanced practitioner if interventions unsuccessful or patient reports new pain  5/9/2022 1628 by Tasha Long RN  Outcome: Adequate for Discharge  5/9/2022 1409 by Tasha Long RN  Outcome: Progressing  5/9/2022 0958 by Tasha Long RN  Outcome: Progressing     Problem: INFECTION - ADULT  Goal: Absence or prevention of progression during hospitalization  Description: INTERVENTIONS:  - Assess and monitor for signs and symptoms of infection  - Monitor lab/diagnostic results  - Monitor all insertion sites, i e  indwelling lines, tubes, and drains  - Monitor endotracheal if appropriate and nasal secretions for changes in amount and color  - [place] appropriate cooling/warming therapies per order  - Administer medications as ordered  - Instruct and encourage patient and family to use good hand hygiene technique  - Identify and instruct in appropriate isolation precautions for identified infection/condition  5/9/2022 1628 by Tasha Long RN  Outcome: Adequate for Discharge  5/9/2022 1409 by Tasha Long RN  Outcome: Progressing  5/9/2022 0958 by Tasha Long RN  Outcome: Progressing     Problem: DISCHARGE PLANNING  Goal: Discharge to home or other facility with appropriate resources  Description: INTERVENTIONS:  - Identify barriers to discharge w/patient and caregiver  - Arrange for needed discharge resources and transportation as appropriate  - Identify discharge learning needs (meds, wound care, etc )  - Arrange for interpretive services to assist at discharge as needed  - Refer to Case Management Department for coordinating discharge planning if the patient needs post-hospital services based on physician/advanced practitioner order or complex needs related to functional status, cognitive ability, or social support system  5/9/2022 1628 by Tea Cuba RN  Outcome: Adequate for Discharge  5/9/2022 1409 by Tea Cuba RN  Outcome: Progressing  5/9/2022 0958 by Tea Cuba RN  Outcome: Progressing     Problem: Knowledge Deficit  Goal: Patient/family/caregiver demonstrates understanding of disease process, treatment plan, medications, and discharge instructions  Description: Complete learning assessment and assess knowledge base    Interventions:  - Provide teaching at level of understanding  - Provide teaching via preferred learning methods  5/9/2022 1628 by Tea Cuba RN  Outcome: Adequate for Discharge  5/9/2022 1409 by Tea Cuba RN  Outcome: Progressing  5/9/2022 0958 by Tea Cuba RN  Outcome: Progressing     Problem: NEUROSENSORY - ADULT  Goal: Achieves stable or improved neurological status  Description: INTERVENTIONS  - Monitor and report changes in neurological status  - Monitor vital signs such as temperature, blood pressure, glucose, and any other labs ordered   - Initiate measures to prevent increased intracranial pressure  - Monitor for seizure activity and implement precautions if appropriate      5/9/2022 1628 by Tea Cuba RN  Outcome: Adequate for Discharge  5/9/2022 1409 by Tea Cuba RN  Outcome: Progressing  5/9/2022 0958 by Tea Cuba RN  Outcome: Progressing  Goal: Remains free of injury related to seizures activity  Description: INTERVENTIONS  - Maintain airway, patient safety  and administer oxygen as ordered  - Monitor patient for seizure activity, document and report duration and description of seizure to physician/advanced practitioner  - If seizure occurs,  ensure patient safety during seizure  - Reorient patient post seizure  - Seizure pads on all 4 side rails  - Instruct patient/family to notify RN of any seizure activity including if an aura is experienced  - Instruct patient/family to call for assistance with activity based on nursing assessment  - Administer anti-seizure medications if ordered    5/9/2022 1628 by Tasha Long RN  Outcome: Adequate for Discharge  5/9/2022 1409 by Tasha Long RN  Outcome: Progressing  5/9/2022 0958 by Tasha Long RN  Outcome: Progressing  Goal: Achieves maximal functionality and self care  Description: INTERVENTIONS  - Monitor swallowing and airway patency with patient fatigue and changes in neurological status  - Encourage and assist patient to increase activity and self care     - Encourage visually impaired, hearing impaired and aphasic patients to use assistive/communication devices  5/9/2022 1628 by Tasha Long RN  Outcome: Adequate for Discharge  5/9/2022 1409 by Tasha Long RN  Outcome: Progressing  5/9/2022 0958 by Tasha Long RN  Outcome: Progressing     Problem: MUSCULOSKELETAL - ADULT  Goal: Maintain or return mobility to safest level of function  Description: INTERVENTIONS:  - Assess patient's ability to carry out ADLs; assess patient's baseline for ADL function and identify physical deficits which impact ability to perform ADLs (bathing, care of mouth/teeth, toileting, grooming, dressing, etc )  - Assess/evaluate cause of self-care deficits   - Assess range of motion  - Assess patient's mobility  - Assess patient's need for assistive devices and provide as appropriate  - Encourage maximum independence but intervene and supervise when necessary  - Involve family in performance of ADLs  - Assess for home care needs following discharge   - Consider OT consult to assist with ADL evaluation and planning for discharge  - Provide patient education as appropriate  5/9/2022 1628 by Tasha Long RN  Outcome: Adequate for Discharge  5/9/2022 1409 by Tasha Long RN  Outcome: Progressing  5/9/2022 0958 by Tasha Long RN  Outcome: Progressing  Goal: Maintain proper alignment of affected body part  Description: INTERVENTIONS:  - Support, maintain and protect limb and body alignment  - Provide patient/ family with appropriate education  5/9/2022 1628 by June Orozco RN  Outcome: Adequate for Discharge  5/9/2022 1409 by June Orozco RN  Outcome: Progressing  5/9/2022 0958 by June Orozco RN  Outcome: Progressing     Problem: Neurological Deficit  Goal: Neurological status is stable or improving  Description: Interventions:  - Monitor and assess patient's level of consciousness, motor function, sensory function, and level of assistance needed for ADLs  - Monitor and report changes from baseline  Collaborate with interdisciplinary team to initiate plan and implement interventions as ordered  - Provide and maintain a safe environment  - Consider seizure precautions  - Consider fall precautions  - Consider aspiration precautions  - Consider bleeding precautions  5/9/2022 1628 by June Orozco RN  Outcome: Adequate for Discharge  5/9/2022 1409 by June Orozco RN  Outcome: Progressing  5/9/2022 0958 by June Orozco RN  Outcome: Progressing     Problem: Activity Intolerance/Impaired Mobility  Goal: Mobility/activity is maintained at optimum level for patient  Description: Interventions:  - Assess and monitor patient  barriers to mobility and need for assistive/adaptive devices  - Assess patient's emotional response to limitations  - Collaborate with interdisciplinary team and initiate plans and interventions as ordered  - Encourage independent activity per ability   - Maintain proper body alignment  - Perform active/passive rom as tolerated/ordered  - Plan activities to conserve energy   - Turn patient as appropriate  5/9/2022 1628 by June Orozco RN  Outcome: Adequate for Discharge  5/9/2022 1409 by June Orozco RN  Outcome: Progressing  5/9/2022 0958 by June Orozco RN  Outcome: Progressing     Problem: Communication Impairment  Goal: Ability to express needs and understand communication  Description: Assess patient's communication skills and ability to understand information    Patient will demonstrate use of effective communication techniques, alternative methods of communication and understanding even if not able to speak  - Encourage communication and provide alternate methods of communication as needed  - Collaborate with case management/ for discharge needs  - Include patient/family/caregiver in decisions related to communication  5/9/2022 1628 by Blas Garcia RN  Outcome: Adequate for Discharge  5/9/2022 1409 by Blas Garcia RN  Outcome: Progressing  5/9/2022 0958 by Blas Garcia RN  Outcome: Progressing     Problem: Potential for Aspiration  Goal: Non-ventilated patient's risk of aspiration is minimized  Description: Assess and monitor vital signs, respiratory status, and labs (WBC)  Monitor for signs of aspiration (tachypnea, cough, rales, wheezing, cyanosis, fever)  - Assess and monitor patient's ability to swallow  - Place patient up in chair to eat if possible  - HOB up at 90 degrees to eat if unable to get patient up into chair   - Supervise patient during oral intake  - Instruct patient/ family to take small bites  - Instruct patient/ family to take small single sips when taking liquids  - Follow patient-specific strategies generated by speech pathologist   5/9/2022 1628 by Blas Garcia RN  Outcome: Adequate for Discharge  5/9/2022 1409 by Blas Garcia RN  Outcome: Progressing  5/9/2022 0958 by Blas Garcia RN  Outcome: Progressing     Problem: Nutrition  Goal: Nutrition/Hydration status is improving  Description: Monitor and assess patient's nutrition/hydration status for malnutrition (ex- brittle hair, bruises, dry skin, pale skin and conjunctiva, muscle wasting, smooth red tongue, and disorientation)  Collaborate with interdisciplinary team and initiate plan and interventions as ordered  Monitor patient's weight and dietary intake as ordered or per policy  Utilize nutrition screening tool and intervene per policy   Determine patient's food preferences and provide high-protein, high-caloric foods as appropriate  - Assist patient with eating   - Allow adequate time for meals   - Encourage patient to take dietary supplement as ordered  - Collaborate with clinical nutritionist   - Include patient/family/caregiver in decisions related to nutrition    5/9/2022 1628 by Nikki Tompkins RN  Outcome: Adequate for Discharge  5/9/2022 1409 by Nikki Tompkins RN  Outcome: Progressing  5/9/2022 0958 by Nikki Tompkins RN  Outcome: Progressing     Problem: Prexisting or High Potential for Compromised Skin Integrity  Goal: Skin integrity is maintained or improved  Description: INTERVENTIONS:  - Identify patients at risk for skin breakdown  - Assess and monitor skin integrity  - Assess and monitor nutrition and hydration status  - Monitor labs   - Assess for incontinence   - Turn and reposition patient  - Assist with mobility/ambulation  - Relieve pressure over bony prominences  - Avoid friction and shearing  - Provide appropriate hygiene as needed including keeping skin clean and dry  - Evaluate need for skin moisturizer/barrier cream  - Collaborate with interdisciplinary team   - Patient/family teaching  - Consider wound care consult   5/9/2022 1628 by Nikki Tompkins RN  Outcome: Adequate for Discharge  5/9/2022 1409 by Nikki Tompkins RN  Outcome: Progressing  5/9/2022 0958 by Nikki Tompkins RN  Outcome: Progressing     Problem: Potential for Falls  Goal: Patient will remain free of falls  Description: INTERVENTIONS:  - Educate patient/family on patient safety including physical limitations  - Instruct patient to call for assistance with activity   - Consult OT/PT to assist with strengthening/mobility   - Keep Call bell within reach  - Keep bed low and locked with side rails adjusted as appropriate  - Keep care items and personal belongings within reach  - Initiate and maintain comfort rounds  - Make Fall Risk Sign visible to staff  - Offer Toileting every  Hours, in advance of need  - Initiate/Maintain alarm  - Obtain necessary fall risk management equipment:   - Apply yellow socks and bracelet for high fall risk patients  - Consider moving patient to room near nurses station  5/9/2022 1628 by Nik Hernandez RN  Outcome: Adequate for Discharge  5/9/2022 1409 by Nik Hernandez RN  Outcome: Progressing  5/9/2022 0958 by Nik Hernandez RN  Outcome: Progressing     Problem: Nutrition/Hydration-ADULT  Goal: Nutrient/Hydration intake appropriate for improving, restoring or maintaining nutritional needs  Description: Monitor and assess patient's nutrition/hydration status for malnutrition  Collaborate with interdisciplinary team and initiate plan and interventions as ordered  Monitor patient's weight and dietary intake as ordered or per policy  Utilize nutrition screening tool and intervene as necessary  Determine patient's food preferences and provide high-protein, high-caloric foods as appropriate       INTERVENTIONS:  - Monitor oral intake, urinary output, labs, and treatment plans  - Assess nutrition and hydration status and recommend course of action  - Evaluate amount of meals eaten  - Assist patient with eating if necessary   - Allow adequate time for meals  - Recommend/ encourage appropriate diets, oral nutritional supplements, and vitamin/mineral supplements  - Order, calculate, and assess calorie counts as needed  - Recommend, monitor, and adjust tube feedings and TPN/PPN based on assessed needs  - Assess need for intravenous fluids  - Provide specific nutrition/hydration education as appropriate  - Include patient/family/caregiver in decisions related to nutrition  5/9/2022 1628 by Nik Hernandez RN  Outcome: Adequate for Discharge  5/9/2022 1409 by Nik Hernandez RN  Outcome: Progressing  5/9/2022 0958 by Nik Hernandez RN  Outcome: Progressing     Problem: SKIN/TISSUE INTEGRITY - ADULT  Goal: Incision(s), wounds(s) or drain site(s) healing without S/S of infection  Description: INTERVENTIONS  - Assess and document dressing, incision, wound bed, drain sites and surrounding tissue  - Provide patient and family education  - Perform skin care/dressing changes   5/9/2022 1628 by Orion Holt RN  Outcome: Adequate for Discharge  5/9/2022 1409 by Oiron Holt RN  Outcome: Progressing  5/9/2022 0958 by Orion Holt RN  Outcome: Progressing  Goal: Pressure injury heals and does not worsen  Description: Interventions:  - Implement low air loss mattress or specialty surface (Criteria met)  - Apply silicone foam dressing  - Instruct/assist with weight shifting every  minutes when in chair   - Limit chair time to  hour intervals  - Use special pressure reducing interventions such as  when in chair   - Apply fecal or urinary incontinence containment device   - Perform passive or active ROM every   - Turn and reposition patient & offload bony prominences every hours   - Utilize friction reducing device or surface for transfers   - Consider consults to  interdisciplinary teams such as   - Use incontinent care products after each incontinent episode such as   - Consider nutrition services referral as needed  5/9/2022 1628 by Orion Holt RN  Outcome: Adequate for Discharge  5/9/2022 1409 by Orion Holt RN  Outcome: Progressing  5/9/2022 0958 by Orion Holt RN  Outcome: Progressing

## 2022-05-09 NOTE — ASSESSMENT & PLAN NOTE
-Patient does have history of left humeral fracture-and left upper extremity on triangular sling for immobilization  venous duplex of left upper extremity negative

## 2022-05-09 NOTE — CASE MANAGEMENT
Case Management Progress Note    Patient name Neel Willoughby  Location [id number] MRN [id number]  : 1960 Date 2022       LOS (days): 14  Geometric Mean LOS (GMLOS) (days): 4 20  Days to GMLOS:-9 9        OBJECTIVE:        Current admission status: Inpatient  Preferred Pharmacy:   [facility] [id number] [address]  Phone: [phone] Fax: [phone]    Primary Care Provider: Jannet Osorio MD    Primary Insurance: MEDICARE  Secondary Insurance: [address] [place] [address] NOTE:  Pt is now off Suboxone, cleared by PT for home w/ outpt therapy  Pt is pending Neuropsych eval for capacity  Pt reports he is eager for d/c, states he does not have a ride and would need Lyft  Addendum:  Pt seen and deemed to have capacity for decision making at this time  Pt reports his friend will be picking him up at 5:45pm  Pt reports having his key to enter his home

## 2022-05-09 NOTE — ASSESSMENT & PLAN NOTE
5/8 Had a lengthy conversation with Delio Man today  We discussed his goals and obviously patient wants to go home independently  Discussed his relationship with his psychiatrist and a why he has been seeing him for all these years  Patient states that he has been seeing Dr Pranav Pisano for depression due to multiple back surgeries causing him pain  I asked if he has ever been diagnosed with any other psychiatric problems such as bipolar which patient reluctantly disagreed with the diagnosis but has been told he has bipolar  Discussed in length how now days everyone is on antidepressants and that there is no stigma behind mental psychiatric diseases these days  Also discussed patient's medication regimen and pain level  Patient states that he is feeling better and he really does not have any pain  He has refused to take Suboxone for the last 24 hours and states that he is not having any withdrawal symptoms  Will discontinue Suboxone altogether  In regards to keeping patient's pain under control will change Tylenol to standing doses as his LFTs have normalized  Will continue his Cymbalta  Will change the p r n  Valium to 5 mg q h s  To help with possible muscle spasms at night and with sleep  Will continue the trazodone 150 mg q h s  I believe changing the p r n  Valium to standing q h s  Will help patient's mentation in the morning as he states that during the morning he feels very foggy  We discussed having him be revaluated by neuropsych tomorrow morning for competency which patient is agreeable to  Patient states that he feels that he might get really nervous during the interview  I told him that I will give him 0 5 mg xanax in the morning to help him be calm during morning hours       5/9 Neuropsych consulted for capacity evaluation

## 2022-05-09 NOTE — PLAN OF CARE
Problem: MOBILITY - ADULT  Goal: Maintain or return to baseline ADL function  Description: INTERVENTIONS:  -  Assess patient's ability to carry out ADLs; assess patient's baseline for ADL function and identify physical deficits which impact ability to perform ADLs (bathing, care of mouth/teeth, toileting, grooming, dressing, etc )  - Assess/evaluate cause of self-care deficits   - Assess range of motion  - Assess patient's mobility; develop plan if impaired  - Assess patient's need for assistive devices and provide as appropriate  - Encourage maximum independence but intervene and supervise when necessary  - Involve family in performance of ADLs  - Assess for home care needs following discharge   - Consider OT consult to assist with ADL evaluation and planning for discharge  - Provide patient education as appropriate  Outcome: Progressing  Goal: Maintains/Returns to pre admission functional level  Description: INTERVENTIONS:  - Perform BMAT or MOVE assessment daily    - Set and communicate daily mobility goal to care team and patient/family/caregiver     - Collaborate with rehabilitation services on mobility goals if consulted  - Out of bed for meals 3 times a day  - Out of bed for toileting  - Record patient progress and toleration of activity level   Outcome: Progressing     Problem: PAIN - ADULT  Goal: Verbalizes/displays adequate comfort level or baseline comfort level  Description: Interventions:  - Encourage patient to monitor pain and request assistance  - Assess pain using appropriate pain scale0-10  - Administer analgesics based on type and severity of pain and evaluate response  - Implement non-pharmacological measures as appropriate and evaluate response  - Consider cultural and social influences on pain and pain management  - Notify physician/advanced practitioner if interventions unsuccessful or patient reports new pain  Outcome: Progressing     Problem: INFECTION - ADULT  Goal: Absence or prevention of progression during hospitalization  Description: INTERVENTIONS:  - Assess and monitor for signs and symptoms of infection  - Monitor lab/diagnostic results  - Monitor all insertion sites, i e  indwelling lines, tubes, and drains  - Monitor endotracheal if appropriate and nasal secretions for changes in amount and color  - [place] appropriate cooling/warming therapies per order  - Administer medications as ordered  - Instruct and encourage patient and family to use good hand hygiene technique  - Identify and instruct in appropriate isolation precautions for identified infection/condition  Outcome: Progressing     Problem: DISCHARGE PLANNING  Goal: Discharge to home or other facility with appropriate resources  Description: INTERVENTIONS:  - Identify barriers to discharge w/patient and caregiver  - Arrange for needed discharge resources and transportation as appropriate  - Identify discharge learning needs (meds, wound care, etc )  - Arrange for interpretive services to assist at discharge as needed  - Refer to Case Management Department for coordinating discharge planning if the patient needs post-hospital services based on physician/advanced practitioner order or complex needs related to functional status, cognitive ability, or social support system  Outcome: Progressing     Problem: Knowledge Deficit  Goal: Patient/family/caregiver demonstrates understanding of disease process, treatment plan, medications, and discharge instructions  Description: Complete learning assessment and assess knowledge base    Interventions:  - Provide teaching at level of understanding  - Provide teaching via preferred learning methods  Outcome: Progressing     Problem: NEUROSENSORY - ADULT  Goal: Achieves stable or improved neurological status  Description: INTERVENTIONS  - Monitor and report changes in neurological status  - Monitor vital signs such as temperature, blood pressure, glucose, and any other labs ordered   - Initiate measures to prevent increased intracranial pressure  - Monitor for seizure activity and implement precautions if appropriate      Outcome: Progressing  Goal: Remains free of injury related to seizures activity  Description: INTERVENTIONS  - Maintain airway, patient safety  and administer oxygen as ordered  - Monitor patient for seizure activity, document and report duration and description of seizure to physician/advanced practitioner  - If seizure occurs,  ensure patient safety during seizure  - Reorient patient post seizure  - Seizure pads on all 4 side rails  - Instruct patient/family to notify RN of any seizure activity including if an aura is experienced  - Instruct patient/family to call for assistance with activity based on nursing assessment  - Administer anti-seizure medications if ordered    Outcome: Progressing  Goal: Achieves maximal functionality and self care  Description: INTERVENTIONS  - Monitor swallowing and airway patency with patient fatigue and changes in neurological status  - Encourage and assist patient to increase activity and self care     - Encourage visually impaired, hearing impaired and aphasic patients to use assistive/communication devices  Outcome: Progressing     Problem: MUSCULOSKELETAL - ADULT  Goal: Maintain or return mobility to safest level of function  Description: INTERVENTIONS:  - Assess patient's ability to carry out ADLs; assess patient's baseline for ADL function and identify physical deficits which impact ability to perform ADLs (bathing, care of mouth/teeth, toileting, grooming, dressing, etc )  - Assess/evaluate cause of self-care deficits   - Assess range of motion  - Assess patient's mobility  - Assess patient's need for assistive devices and provide as appropriate  - Encourage maximum independence but intervene and supervise when necessary  - Involve family in performance of ADLs  - Assess for home care needs following discharge   - Consider OT consult to assist with ADL evaluation and planning for discharge  - Provide patient education as appropriate  Outcome: Progressing     Problem: MUSCULOSKELETAL - ADULT  Goal: Maintain or return mobility to safest level of function  Description: INTERVENTIONS:  - Assess patient's ability to carry out ADLs; assess patient's baseline for ADL function and identify physical deficits which impact ability to perform ADLs (bathing, care of mouth/teeth, toileting, grooming, dressing, etc )  - Assess/evaluate cause of self-care deficits   - Assess range of motion  - Assess patient's mobility  - Assess patient's need for assistive devices and provide as appropriate  - Encourage maximum independence but intervene and supervise when necessary  - Involve family in performance of ADLs  - Assess for home care needs following discharge   - Consider OT consult to assist with ADL evaluation and planning for discharge  - Provide patient education as appropriate  Outcome: Progressing  Goal: Maintain proper alignment of affected body part  Description: INTERVENTIONS:  - Support, maintain and protect limb and body alignment  - Provide patient/ family with appropriate education  Outcome: Progressing     Problem: SKIN/TISSUE INTEGRITY - ADULT  Goal: Incision(s), wounds(s) or drain site(s) healing without S/S of infection  Description: INTERVENTIONS  - Assess and document dressing, incision, wound bed, drain sites and surrounding tissue  - Provide patient and family education  - Perform skin care/dressing changes   Outcome: Progressing  Goal: Pressure injury heals and does not worsen  Description: Interventions:  - Implement low air loss mattress or specialty surface (Criteria met)  - Apply silicone foam dressing  - Apply fecal or urinary incontinence containment device     - Utilize friction reducing device or surface for transfers     - Consider nutrition services referral as needed  Outcome: Progressing     Problem: Neurological Deficit  Goal: Neurological status is stable or improving  Description: Interventions:  - Monitor and assess patient's level of consciousness, motor function, sensory function, and level of assistance needed for ADLs  - Monitor and report changes from baseline  Collaborate with interdisciplinary team to initiate plan and implement interventions as ordered  - Provide and maintain a safe environment  - Consider seizure precautions  - Consider fall precautions  - Consider aspiration precautions  - Consider bleeding precautions  Outcome: Progressing     Problem: Activity Intolerance/Impaired Mobility  Goal: Mobility/activity is maintained at optimum level for patient  Description: Interventions:  - Assess and monitor patient  barriers to mobility and need for assistive/adaptive devices  - Assess patient's emotional response to limitations  - Collaborate with interdisciplinary team and initiate plans and interventions as ordered  - Encourage independent activity per ability   - Maintain proper body alignment  - Perform active/passive rom as tolerated/ordered  - Plan activities to conserve energy   - Turn patient as appropriate  Outcome: Progressing     Problem: Communication Impairment  Goal: Ability to express needs and understand communication  Description: Assess patient's communication skills and ability to understand information  Patient will demonstrate use of effective communication techniques, alternative methods of communication and understanding even if not able to speak  - Encourage communication and provide alternate methods of communication as needed  - Collaborate with case management/ for discharge needs  - Include patient/family/caregiver in decisions related to communication  Outcome: Progressing     Problem: Potential for Aspiration  Goal: Non-ventilated patient's risk of aspiration is minimized  Description: Assess and monitor vital signs, respiratory status, and labs (WBC)    Monitor for signs of aspiration (tachypnea, cough, rales, wheezing, cyanosis, fever)  - Assess and monitor patient's ability to swallow  - Place patient up in chair to eat if possible  - HOB up at 90 degrees to eat if unable to get patient up into chair   - Supervise patient during oral intake  - Instruct patient/ family to take small bites  - Instruct patient/ family to take small single sips when taking liquids  - Follow patient-specific strategies generated by speech pathologist   Outcome: Progressing     Problem: Nutrition  Goal: Nutrition/Hydration status is improving  Description: Monitor and assess patient's nutrition/hydration status for malnutrition (ex- brittle hair, bruises, dry skin, pale skin and conjunctiva, muscle wasting, smooth red tongue, and disorientation)  Collaborate with interdisciplinary team and initiate plan and interventions as ordered  Monitor patient's weight and dietary intake as ordered or per policy  Utilize nutrition screening tool and intervene per policy  Determine patient's food preferences and provide high-protein, high-caloric foods as appropriate  - Assist patient with eating   - Allow adequate time for meals   - Encourage patient to take dietary supplement as ordered  - Collaborate with clinical nutritionist   - Include patient/family/caregiver in decisions related to nutrition    Outcome: Progressing     Problem: Prexisting or High Potential for Compromised Skin Integrity  Goal: Skin integrity is maintained or improved  Description: INTERVENTIONS:  - Identify patients at risk for skin breakdown  - Assess and monitor skin integrity  - Assess and monitor nutrition and hydration status  - Monitor labs   - Assess for incontinence   - Turn and reposition patient  - Assist with mobility/ambulation  - Relieve pressure over bony prominences  - Avoid friction and shearing  - Provide appropriate hygiene as needed including keeping skin clean and dry  - Evaluate need for skin moisturizer/barrier cream  - Collaborate with interdisciplinary team   - Patient/family teaching  - Consider wound care consult   Outcome: Progressing     Problem: Potential for Falls  Goal: Patient will remain free of falls  Description: INTERVENTIONS:  - Educate patient/family on patient safety including physical limitations  - Instruct patient to call for assistance with activity   - Consult OT/PT to assist with strengthening/mobility   - Keep Call bell within reach  - Keep bed low and locked with side rails adjusted as appropriate  - Keep care items and personal belongings within reach  - Initiate and maintain comfort rounds  - Make Fall Risk Sign visible to staff  - Apply yellow socks and bracelet for high fall risk patients  - Consider moving patient to room near nurses station  Outcome: Progressing     Problem: Nutrition/Hydration-ADULT  Goal: Nutrient/Hydration intake appropriate for improving, restoring or maintaining nutritional needs  Description: Monitor and assess patient's nutrition/hydration status for malnutrition  Collaborate with interdisciplinary team and initiate plan and interventions as ordered  Monitor patient's weight and dietary intake as ordered or per policy  Utilize nutrition screening tool and intervene as necessary  Determine patient's food preferences and provide high-protein, high-caloric foods as appropriate       INTERVENTIONS:  - Monitor oral intake, urinary output, labs, and treatment plans  - Assess nutrition and hydration status and recommend course of action  - Evaluate amount of meals eaten  - Assist patient with eating if necessary   - Allow adequate time for meals  - Recommend/ encourage appropriate diets, oral nutritional supplements, and vitamin/mineral supplements  - Order, calculate, and assess calorie counts as needed  - Recommend, monitor, and adjust tube feedings and TPN/PPN based on assessed needs  - Assess need for intravenous fluids  - Provide specific nutrition/hydration education as appropriate  - Include patient/family/caregiver in decisions related to nutrition  Outcome: Progressing

## 2022-05-09 NOTE — ASSESSMENT & PLAN NOTE
-Most likely secondary to Suboxone and Valium  -MRI of the brain also shows empty sella  -Mental status appears to have returned to baseline, will discuss with patient's family  -Resume home medications    -Neurology consult appreciated  - pt follow with Lauri Lerner  -Back to baseline    5/9 deemed competent by neuropsych  Will d/c home

## 2022-05-09 NOTE — ASSESSMENT & PLAN NOTE
-CT chest and pelvis shows There are fractures of the left 6, 7th, 8th, and 9th ribs with reactive bone formation present suggesting that these fractures are subacute or less likely chronic   -Continue pain control    These do not appear to be acute fractures and hence continue supportive care  -PT OT cleared patient

## 2022-05-09 NOTE — PLAN OF CARE
Problem: MOBILITY - ADULT  Goal: Maintain or return to baseline ADL function  Description: INTERVENTIONS:  -  Assess patient's ability to carry out ADLs; assess patient's baseline for ADL function and identify physical deficits which impact ability to perform ADLs (bathing, care of mouth/teeth, toileting, grooming, dressing, etc )  - Assess/evaluate cause of self-care deficits   - Assess range of motion  - Assess patient's mobility; develop plan if impaired  - Assess patient's need for assistive devices and provide as appropriate  - Encourage maximum independence but intervene and supervise when necessary  - Involve family in performance of ADLs  - Assess for home care needs following discharge   - Consider OT consult to assist with ADL evaluation and planning for discharge  - Provide patient education as appropriate  5/9/2022 1409 by Daniel Reyes RN  Outcome: Progressing  5/9/2022 0958 by Daniel Reyes RN  Outcome: Progressing  Goal: Maintains/Returns to pre admission functional level  Description: INTERVENTIONS:  - Perform BMAT or MOVE assessment daily    - Set and communicate daily mobility goal to care team and patient/family/caregiver     - Collaborate with rehabilitation services on mobility goals if consulted  - Out of bed for meals 3 times a day  - Out of bed for toileting  - Record patient progress and toleration of activity level   5/9/2022 1409 by Daniel Reyes RN  Outcome: Progressing  5/9/2022 0958 by Daniel Reyes RN  Outcome: Progressing     Problem: PAIN - ADULT  Goal: Verbalizes/displays adequate comfort level or baseline comfort level  Description: Interventions:  - Encourage patient to monitor pain and request assistance  - Assess pain using appropriate pain scale0-10  - Administer analgesics based on type and severity of pain and evaluate response  - Implement non-pharmacological measures as appropriate and evaluate response  - Consider cultural and social influences on pain and pain management  - Notify physician/advanced practitioner if interventions unsuccessful or patient reports new pain  5/9/2022 1409 by Itzel Thomas RN  Outcome: Progressing  5/9/2022 0958 by Itzel Thomas RN  Outcome: Progressing     Problem: INFECTION - ADULT  Goal: Absence or prevention of progression during hospitalization  Description: INTERVENTIONS:  - Assess and monitor for signs and symptoms of infection  - Monitor lab/diagnostic results  - Monitor all insertion sites, i e  indwelling lines, tubes, and drains  - Monitor endotracheal if appropriate and nasal secretions for changes in amount and color  - [place] appropriate cooling/warming therapies per order  - Administer medications as ordered  - Instruct and encourage patient and family to use good hand hygiene technique  - Identify and instruct in appropriate isolation precautions for identified infection/condition  5/9/2022 1409 by Itzel Thomas RN  Outcome: Progressing  5/9/2022 0958 by Itzel Thomas RN  Outcome: Progressing     Problem: DISCHARGE PLANNING  Goal: Discharge to home or other facility with appropriate resources  Description: INTERVENTIONS:  - Identify barriers to discharge w/patient and caregiver  - Arrange for needed discharge resources and transportation as appropriate  - Identify discharge learning needs (meds, wound care, etc )  - Arrange for interpretive services to assist at discharge as needed  - Refer to Case Management Department for coordinating discharge planning if the patient needs post-hospital services based on physician/advanced practitioner order or complex needs related to functional status, cognitive ability, or social support system  5/9/2022 1409 by Itzel Thomas RN  Outcome: Progressing  5/9/2022 0958 by Itzel Thomas RN  Outcome: Progressing     Problem: Knowledge Deficit  Goal: Patient/family/caregiver demonstrates understanding of disease process, treatment plan, medications, and discharge instructions  Description: Complete learning assessment and assess knowledge base  Interventions:  - Provide teaching at level of understanding  - Provide teaching via preferred learning methods  5/9/2022 1409 by Fadia Hodgson RN  Outcome: Progressing  5/9/2022 0958 by Fadia Hodgson RN  Outcome: Progressing     Problem: NEUROSENSORY - ADULT  Goal: Achieves stable or improved neurological status  Description: INTERVENTIONS  - Monitor and report changes in neurological status  - Monitor vital signs such as temperature, blood pressure, glucose, and any other labs ordered   - Initiate measures to prevent increased intracranial pressure  - Monitor for seizure activity and implement precautions if appropriate      5/9/2022 1409 by Fadia Hodgson RN  Outcome: Progressing  5/9/2022 0958 by Fadia Hodgson RN  Outcome: Progressing  Goal: Remains free of injury related to seizures activity  Description: INTERVENTIONS  - Maintain airway, patient safety  and administer oxygen as ordered  - Monitor patient for seizure activity, document and report duration and description of seizure to physician/advanced practitioner  - If seizure occurs,  ensure patient safety during seizure  - Reorient patient post seizure  - Seizure pads on all 4 side rails  - Instruct patient/family to notify RN of any seizure activity including if an aura is experienced  - Instruct patient/family to call for assistance with activity based on nursing assessment  - Administer anti-seizure medications if ordered    5/9/2022 1409 by Fadia Hodgson RN  Outcome: Progressing  5/9/2022 0958 by Fadia Hodgson RN  Outcome: Progressing  Goal: Achieves maximal functionality and self care  Description: INTERVENTIONS  - Monitor swallowing and airway patency with patient fatigue and changes in neurological status  - Encourage and assist patient to increase activity and self care     - Encourage visually impaired, hearing impaired and aphasic patients to use assistive/communication devices  5/9/2022 1409 by Daniel Reyes RN  Outcome: Progressing  5/9/2022 0958 by Daniel Reyes RN  Outcome: Progressing     Problem: MUSCULOSKELETAL - ADULT  Goal: Maintain or return mobility to safest level of function  Description: INTERVENTIONS:  - Assess patient's ability to carry out ADLs; assess patient's baseline for ADL function and identify physical deficits which impact ability to perform ADLs (bathing, care of mouth/teeth, toileting, grooming, dressing, etc )  - Assess/evaluate cause of self-care deficits   - Assess range of motion  - Assess patient's mobility  - Assess patient's need for assistive devices and provide as appropriate  - Encourage maximum independence but intervene and supervise when necessary  - Involve family in performance of ADLs  - Assess for home care needs following discharge   - Consider OT consult to assist with ADL evaluation and planning for discharge  - Provide patient education as appropriate  5/9/2022 1409 by Daniel Reyes RN  Outcome: Progressing  5/9/2022 0958 by Daniel Reyes RN  Outcome: Progressing  Goal: Maintain proper alignment of affected body part  Description: INTERVENTIONS:  - Support, maintain and protect limb and body alignment  - Provide patient/ family with appropriate education  5/9/2022 1409 by Daniel Reyes RN  Outcome: Progressing  5/9/2022 0958 by Daniel Reyes RN  Outcome: Progressing     Problem: Neurological Deficit  Goal: Neurological status is stable or improving  Description: Interventions:  - Monitor and assess patient's level of consciousness, motor function, sensory function, and level of assistance needed for ADLs  - Monitor and report changes from baseline  Collaborate with interdisciplinary team to initiate plan and implement interventions as ordered  - Provide and maintain a safe environment  - Consider seizure precautions  - Consider fall precautions  - Consider aspiration precautions    - Consider bleeding precautions  5/9/2022 1409 by Luisito Barnhart RN  Outcome: Progressing  5/9/2022 0958 by Luisito Barnhart RN  Outcome: Progressing     Problem: Activity Intolerance/Impaired Mobility  Goal: Mobility/activity is maintained at optimum level for patient  Description: Interventions:  - Assess and monitor patient  barriers to mobility and need for assistive/adaptive devices  - Assess patient's emotional response to limitations  - Collaborate with interdisciplinary team and initiate plans and interventions as ordered  - Encourage independent activity per ability   - Maintain proper body alignment  - Perform active/passive rom as tolerated/ordered  - Plan activities to conserve energy   - Turn patient as appropriate  5/9/2022 1409 by Luisito Barnhart RN  Outcome: Progressing  5/9/2022 0958 by Luisito Barnhart RN  Outcome: Progressing     Problem: Communication Impairment  Goal: Ability to express needs and understand communication  Description: Assess patient's communication skills and ability to understand information  Patient will demonstrate use of effective communication techniques, alternative methods of communication and understanding even if not able to speak  - Encourage communication and provide alternate methods of communication as needed  - Collaborate with case management/ for discharge needs  - Include patient/family/caregiver in decisions related to communication  5/9/2022 1409 by Luisito Barnhart RN  Outcome: Progressing  5/9/2022 0958 by Luisito Barnhart RN  Outcome: Progressing     Problem: Potential for Aspiration  Goal: Non-ventilated patient's risk of aspiration is minimized  Description: Assess and monitor vital signs, respiratory status, and labs (WBC)  Monitor for signs of aspiration (tachypnea, cough, rales, wheezing, cyanosis, fever)  - Assess and monitor patient's ability to swallow  - Place patient up in chair to eat if possible    - HOB up at 90 degrees to eat if unable to get patient up into chair   - Supervise patient during oral intake  - Instruct patient/ family to take small bites  - Instruct patient/ family to take small single sips when taking liquids  - Follow patient-specific strategies generated by speech pathologist   5/9/2022 1409 by Niles Pike RN  Outcome: Progressing  5/9/2022 0958 by Niles Pike RN  Outcome: Progressing     Problem: Nutrition  Goal: Nutrition/Hydration status is improving  Description: Monitor and assess patient's nutrition/hydration status for malnutrition (ex- brittle hair, bruises, dry skin, pale skin and conjunctiva, muscle wasting, smooth red tongue, and disorientation)  Collaborate with interdisciplinary team and initiate plan and interventions as ordered  Monitor patient's weight and dietary intake as ordered or per policy  Utilize nutrition screening tool and intervene per policy  Determine patient's food preferences and provide high-protein, high-caloric foods as appropriate  - Assist patient with eating   - Allow adequate time for meals   - Encourage patient to take dietary supplement as ordered  - Collaborate with clinical nutritionist   - Include patient/family/caregiver in decisions related to nutrition    5/9/2022 1409 by Niles Pike RN  Outcome: Progressing  5/9/2022 0958 by Niles Pike RN  Outcome: Progressing     Problem: Prexisting or High Potential for Compromised Skin Integrity  Goal: Skin integrity is maintained or improved  Description: INTERVENTIONS:  - Identify patients at risk for skin breakdown  - Assess and monitor skin integrity  - Assess and monitor nutrition and hydration status  - Monitor labs   - Assess for incontinence   - Turn and reposition patient  - Assist with mobility/ambulation  - Relieve pressure over bony prominences  - Avoid friction and shearing  - Provide appropriate hygiene as needed including keeping skin clean and dry  - Evaluate need for skin moisturizer/barrier cream  - Collaborate with interdisciplinary team   - Patient/family teaching  - Consider wound care consult   5/9/2022 1409 by Reta Stock RN  Outcome: Progressing  5/9/2022 0958 by Reta Stock RN  Outcome: Progressing     Problem: Potential for Falls  Goal: Patient will remain free of falls  Description: INTERVENTIONS:  - Educate patient/family on patient safety including physical limitations  - Instruct patient to call for assistance with activity   - Consult OT/PT to assist with strengthening/mobility   - Keep Call bell within reach  - Keep bed low and locked with side rails adjusted as appropriate  - Keep care items and personal belongings within reach  - Initiate and maintain comfort rounds  - Make Fall Risk Sign visible to staff  - Offer Toileting every 2 Hours, in advance of need  - Initiate/Maintain bed and chair alarm  - Obtain necessary fall risk management equipment:   - Apply yellow socks and bracelet for high fall risk patients  - Consider moving patient to room near nurses station  5/9/2022 1409 by Reta Stock RN  Outcome: Progressing  5/9/2022 0958 by Reta Stock RN  Outcome: Progressing     Problem: Nutrition/Hydration-ADULT  Goal: Nutrient/Hydration intake appropriate for improving, restoring or maintaining nutritional needs  Description: Monitor and assess patient's nutrition/hydration status for malnutrition  Collaborate with interdisciplinary team and initiate plan and interventions as ordered  Monitor patient's weight and dietary intake as ordered or per policy  Utilize nutrition screening tool and intervene as necessary  Determine patient's food preferences and provide high-protein, high-caloric foods as appropriate       INTERVENTIONS:  - Monitor oral intake, urinary output, labs, and treatment plans  - Assess nutrition and hydration status and recommend course of action  - Evaluate amount of meals eaten  - Assist patient with eating if necessary   - Allow adequate time for meals  - Recommend/ encourage appropriate diets, oral nutritional supplements, and vitamin/mineral supplements  - Order, calculate, and assess calorie counts as needed  - Recommend, monitor, and adjust tube feedings and TPN/PPN based on assessed needs  - Assess need for intravenous fluids  - Provide specific nutrition/hydration education as appropriate  - Include patient/family/caregiver in decisions related to nutrition  5/9/2022 1409 by Sandra Parra RN  Outcome: Progressing  5/9/2022 0958 by Sandra Parra RN  Outcome: Progressing     Problem: SKIN/TISSUE INTEGRITY - ADULT  Goal: Incision(s), wounds(s) or drain site(s) healing without S/S of infection  Description: INTERVENTIONS  - Assess and document dressing, incision, wound bed, drain sites and surrounding tissue  - Provide patient and family education  - Perform skin care/dressing changes   5/9/2022 1409 by Sandra Parra RN  Outcome: Progressing  5/9/2022 0958 by Sandra Parra RN  Outcome: Progressing  Goal: Pressure injury heals and does not worsen  Description: Interventions:  - Implement low air loss mattress or specialty surface (Criteria met)  - Apply silicone foam dressing  - Instruct/assist with weight shifting every 30 minutes when in chair   - Limit chair time to 2 hour intervals  - Use special pressure reducing interventions such as  when in chair   - Apply fecal or urinary incontinence containment device   - Perform passive or active ROM every   - Turn and reposition patient & offload bony prominences every 2 hours   - Utilize friction reducing device or surface for transfers   - Consider consults to  interdisciplinary teams such as   - Use incontinent care products after each incontinent episode such as   - Consider nutrition services referral as needed  5/9/2022 1409 by Sandra Parra RN  Outcome: Progressing  5/9/2022 0958 by Sandra Parra RN  Outcome: Progressing

## 2022-05-09 NOTE — DISCHARGE SUMMARY
[address]  Discharge- Gómez Akins 1960, 63 y o  male MRN: [id number]  Unit/Bed#: -01 Encounter: [id number]  Primary Care Provider: Michelle Mustafa MD   Date and time admitted to hospital: 4/25/2022 10:18 AM    * Toxic encephalopathy  Assessment & Plan  -Most likely secondary to Suboxone and Valium  -MRI of the brain also shows empty sella  -Mental status appears to have returned to baseline, will discuss with patient's family  -Resume home medications    -Neurology consult appreciated  - pt follow with Larnell Leader Dr Antonia Aguila  -Back to baseline    5/9 deemed competent by neuropsych  Will d/c home  Goals of care, counseling/discussion  Assessment & Plan  5/8 Had a lengthy conversation with Torito Lamas today  We discussed his goals and obviously patient wants to go home independently  Discussed his relationship with his psychiatrist and a why he has been seeing him for all these years  Patient states that he has been seeing Dr Ania Tavarez for depression due to multiple back surgeries causing him pain  I asked if he has ever been diagnosed with any other psychiatric problems such as bipolar which patient reluctantly disagreed with the diagnosis but has been told he has bipolar  Discussed in length how now days everyone is on antidepressants and that there is no stigma behind mental psychiatric diseases these days  Also discussed patient's medication regimen and pain level  Patient states that he is feeling better and he really does not have any pain  He has refused to take Suboxone for the last 24 hours and states that he is not having any withdrawal symptoms  Will discontinue Suboxone altogether  In regards to keeping patient's pain under control will change Tylenol to standing doses as his LFTs have normalized  Will continue his Cymbalta  Will change the p r n  Valium to 5 mg q h s  To help with possible muscle spasms at night and with sleep    Will continue the trazodone 150 mg q h s  I believe changing the p r n  Valium to standing q h s  Will help patient's mentation in the morning as he states that during the morning he feels very foggy  We discussed having him be revaluated by neuropsych tomorrow morning for competency which patient is agreeable to  Patient states that he feels that he might get really nervous during the interview  I told him that I will give him 0 5 mg xanax in the morning to help him be calm during morning hours  5/9 Neuropsych consulted for capacity evaluation - Deemed competent will d/c home  Swelling of left upper extremity  Assessment & Plan    -Patient does have history of left humeral fracture-and left upper extremity on triangular sling for immobilization  venous duplex of left upper extremity negative      Empty sella turcica (HCC)  Assessment & Plan  -Incidental finding  -TSH normal, does not have any visual change  No electrolyte imbalance  No signs of symptoms of acromegaly,  -Patient will need outpatient follow-up with Neurology and Ophthalmology-as per Neurology recommendation    Sacral decubitus ulcer, stage III ([address] )  Assessment & Plan  -Continue wound care  -Follow wound culture  -Wound culture shows E coli, Gram-positive rods, Gram-positive cocci-sensitive to Augmentin, started Augmentin for 7 days   Through 5/6  -wound care evaluation- addition of santyl daily left buttock, to replace maxorb Ag  - continue Hydrea carotid to bilateral sacrum, buttock and bilateral heels  - Q2 turns, offload heels, air cushion when out of bed    Opioid dependence (HCC)  Assessment & Plan  -chronic back pain, evidenced by daily use of Suboxone 8mg three times a day, requiring continued regime in hospital    -5/8 patient refuse Suboxone for last 24 hours no withdrawal symptoms will discontinue altogether patient agrees      Left humeral fracture  Assessment & Plan  Patient had left humeral fracture diagnosed on 04/04/2022 on an ED visit  Still present on imaging  -Persistent medially displaced previously acute left humeral head and neck fracture  · Orthopedic recommendation appreciated:Patient may not lift more than 1 lb with the left upper extremity  · PT/OT pendulum exercises, active assisted range of motion, below shoulder height left upper extremity  PT OT cleared patient    Closed fracture of multiple ribs of left side with routine healing  Assessment & Plan  -CT chest and pelvis shows There are fractures of the left 6, 7th, 8th, and 9th ribs with reactive bone formation present suggesting that these fractures are subacute or less likely chronic   -Continue pain control  These do not appear to be acute fractures and hence continue supportive care  -PT OT cleared patient    Back pain  Assessment & Plan  -Patient is complaining of chronic back pain  With multiple surgeries    - pt has recently reestablished with pain management, restarting suboxone 4/11 per PDMP review, prior to that last Rx 7/21'  -Suboxone discontinued as patient has refused day for last 24 hours without withdrawal symptoms  -Doing well off suboxone, will discharge without    Other hyperlipidemia  Assessment & Plan  -Continue statin therapy    Bipolar affective disorder ([facility] [address] )  Assessment & Plan  -Continue home regimen of Valium, Cymbalta and Latuda  -Psychiatry consult appreciated    -5/8medication changes made see goals of care counseling      Ecchymosis-resolved as of 5/2/2022  Assessment & Plan  Affecting left flank, left lateral chest, left upper thigh-remained stable  Resume heparin      Medical Problems             Resolved Problems  Date Reviewed: 5/9/2022          Resolved    Ecchymosis 5/2/2022     Resolved by  Joanne Perdue DO              Medical Problems             Resolved Problems  Date Reviewed: 5/9/2022          Resolved    Ecchymosis 5/2/2022     Resolved by  Joanne Perdue DO              Discharging Physician / Practitioner: Rosie Rey DO  PCP: Florence Haney MD  Admission Date:   Admission Orders (From admission, onward)     Ordered        04/25/22 1306  INPATIENT ADMISSION  Once                      Discharge Date: 05/09/22    Consultations During Hospital Stay:  · Neurology  · Neuropsych  · Orthopedics    Procedures Performed:   · None    Significant Findings / Test Results:   Narrative & Impression   MRI BRAIN WITHOUT CONTRAST     INDICATION: Acute metabolic encephalopathy      COMPARISON:   None      TECHNIQUE:  Sagittal T1, axial T2, axial FLAIR, axial T1, axial [place] and axial diffusion imaging      IMAGE QUALITY:  Diagnostic      FINDINGS:     BRAIN PARENCHYMA: Few periventricular and subcortical T-2/FLAIR hyperintense foci suggesting early microangiopathic change  No restricted diffusion      No intracranial hemorrhage, mass or mass effect  No evidence of intracranial hemorrhage     VENTRICLES:  No hydrocephalus or extra-axial collection      SELLA AND PITUITARY GLAND:  Mildly enlarged empty sella      ORBITS:  Normal      PARANASAL SINUSES:  Normal      VASCULATURE:  Evaluation of the major intracranial vasculature demonstrates appropriate flow voids      CALVARIUM AND SKULL BASE:  No osseous lesion      EXTRACRANIAL SOFT TISSUES:  No soft tissue mass      IMPRESSION:        1  No evidence of acute infarct, intracranial hemorrhage or mass  2   Enlarged, empty sella, a nonspecific finding which can be seen in the setting of radiographic intracranial hypertension         LEFT SHOULDER     INDICATION:   Pain questionable injury      COMPARISON:  4/13/2022     VIEWS:  XR SHOULDER 2+ VW LEFT   Images: 3     FINDINGS:     Persistent medially displaced mildly comminuted previously acute left humeral head and neck fracture, essentially unchanged     Mild osteoarthritis of the glenohumeral and acromioclavicular joints      No lytic or blastic osseous lesion      Soft tissues are unremarkable    Partially visualized multilevel thoracic spinal fixation hardware again evident     IMPRESSION:     Persistent medially displaced previously acute left humeral head and neck fracture    CT CHEST, ABDOMEN AND PELVIS WITH IV CONTRAST     INDICATION:   TRAUMA  Confusion  Change in mental status  Multiple recent falls  Left shoulder fracture      COMPARISON:  None      TECHNIQUE: CT examination of the chest, abdomen and pelvis was performed  Axial, sagittal, and coronal 2D reformatted images were created from the source data and submitted for interpretation      Radiation dose length product (DLP) for this visit:  860 94 mGy-cm   This examination, like all CT scans performed in the [facility], was performed utilizing techniques to minimize radiation dose exposure, including the use of iterative   reconstruction and automated exposure control      IV Contrast:  100 mL of iohexol (OMNIPAQUE)  Enteric Contrast: Enteric contrast was administered      FINDINGS: Metal artifact seen throughout the chest, abdomen and pelvis is result of spinal fixation for scoliosis extending from T3 through the sacrum      CHEST     LUNGS:  Atelectasis in the posterior mid and lower lung zones  Subtle linear densities in the right upper lobe consistent with atelectasis/scarring  Scarring in the medial aspect of the superior segment right lower lobe  No pulmonary contusion      PLEURA:  Unremarkable      HEART/GREAT VESSELS: Heart is unremarkable for patient's age  No thoracic aortic aneurysm      MEDIASTINUM AND PHUONG:  Unremarkable      CHEST WALL AND LOWER NECK:  Fracture left proximal humerus, incompletely evaluated  Hemorrhage/edema associated with the soft tissues of the left shoulder      ABDOMEN     LIVER/BILIARY TREE:  Decreased parenchymal density in the liver suggestive of steatosis  No traumatic hepatic injuries      GALLBLADDER:  No calcified gallstones   No pericholecystic inflammatory change      SPLEEN:  Unremarkable      PANCREAS:  Fatty pancreatic atrophy      ADRENAL GLANDS:  Unremarkable      KIDNEYS/URETERS:  Tiny nonobstructing intrarenal calculi on the order of between 2 and 4 mm in size  Cortical cysts  No solid renal mass, urinary tract calculus, or hydronephrosis detected      STOMACH AND BOWEL:  Mild gastric wall thickening and gastric mucosal enhancement suggestive of gastritis but nonspecific  Stomach and bowel loops appear otherwise unremarkable      APPENDIX:  No findings to suggest appendicitis      ABDOMINOPELVIC CAVITY:  No ascites  No pneumoperitoneum  No lymphadenopathy      VESSELS:  Unremarkable for patient's age      PELVIS     REPRODUCTIVE ORGANS:  Unremarkable for patient's age      URINARY BLADDER:  Marked distention of the urinary bladder  No bladder mass      ABDOMINAL WALL/INGUINAL REGIONS:  Unremarkable      OSSEOUS STRUCTURES:  Posterior spinal fusion with intact fusion hardware from the T4 level through the sacrum  Severe compression deformity noted at L4  Mild compression deformity at L3  Interbody cage spanning the inferior L3 endplate through the   inferior endplate of L5  Bone graft material associated with posterior elements in the lower lumbar spine  Spinal hardware is intact      There are fractures of the left 6, 7th, 8th, and 9th ribs with reactive bone formation present suggesting that these fractures are subacute or less likely chronic  Left proximal humerus fracture, incompletely evaluated  Otherwise no acute fractures   identified  No destructive osseous lesion      IMPRESSION:     Probable subacute left-sided rib fractures  Left proximal humerus fracture, incompletely evaluated  Otherwise no acute fractures identified        No traumatic visceral injury in the chest, abdomen or pelvis      Marked distention of the urinary bladder  Tiny nonobstructing intrarenal calculi      Narrative & Impression   LEFT SHOULDER     INDICATION:   S42 212A: Unspecified displaced fracture of surgical neck of left humerus, initial encounter for closed fracture  Follow up fracture     COMPARISON:  4/4/2022     VIEWS:  XR SHOULDER 2+ VW LEFT         FINDINGS:     Displaced fracture surgical neck of the humerus is noted  The humeral head still appears centered over the glenoid on 2 views  The fracture is overriding with the distal shaft being medially displaced by 100% of its diameter without with minimal   posterior displacement on the lateral view     Some callus formation has begun in the soft tissues      Orthopedic hardware is noted in the thoracic spine      No significant degenerative changes      No lytic or blastic osseous lesion      Soft tissues are unremarkable      IMPRESSION:     Displaced fracture of the surgical neck of the humerus without significant change  LEFT SHOULDER     INDICATION:   dislocation      COMPARISON:  None     VIEWS:  XR SHOULDER 2+ VW LEFT         FINDINGS:     Comminuted fracture of the proximal left humeral shaft, at the diametaphysis, with anterior displacement of the major distal fragment  No dislocation      Incidental note made of extensive thoracic spinal hardware      No significant degenerative changes      No lytic or blastic osseous lesion      Soft tissues are unremarkable      IMPRESSION:     Comminuted, displaced fracture of the proximal left humeral shaft        Incidental Findings:   · See above, empty sella turcica     Test Results Pending at Discharge (will require follow up):   · none     Outpatient Tests Requested:  · none    Complications:  none    Reason for Admission: Acute metabolic encephalopathy    Hospital Course:   Joni Hauser is a 63 y o  male patient who originally presented to the hospital on 4/25/2022 due to confusion  Patient was found by neighbors on the floor stool incontinence yelling  On admission patient was found to have persistently medially displaced fracture from ED visit on 04/04/2022    Orthopedics recommended no lifting more than 1 lb with left upper extremity  PT OT evaluated and he was cleared to be discharged home  He was also found to have closed fracture of the left 6th 7th 8th and 9th rib with reactive bone formation suggesting that these fractures are subacute  On admission patient was found to be encephalopathic  Urine drug screen was positive for benzos (patient on valium and suboxone at home for chronic back pain)  Patient was also treated for bipolar with with latuda and Cymbalta  Neurology was consulted for evaluation due to encephalopathy and and the sella turcica, on CT brain  MRI brain was obtained showing No evidence of acute infarct, intracranial hemorrhage or mass  Enlarged, empty sella, a nonspecific finding which can be seen in the setting of radiographic intracranial hypertension  That time neurology recommended outpatient neurology follow-up, and ophthalmology referral which have been made  Patient was also evaluated by neuropsych and initially he was deemed incompetent to make decisions  Patient remained in the hospital due to difficulty finding placement as patient is on Suboxone or pain control and history of opioid use due to chronic pain  Over last few days patient's mentation had improved  After long discussions Carlene Vega over the weekend medication adjustments were made  Patient was taken off Suboxone as he had been doing well without it and no withdrawal symptoms  Valium was changed to q h s at a lower dose due to it making him tired in the morning  Had neuropsych re-evaluate the patient who deemed the patient competent and patient is being discharged home today  Please see above list of diagnoses and related plan for additional information  Condition at Discharge: fair    Discharge Day Visit / Exam:   * Please refer to separate progress note for these details *    Discussion with Family: Patient declined call to        Discharge instructions/Information to patient and family: See after visit summary for information provided to patient and family  Provisions for Follow-Up Care:  See after visit summary for information related to follow-up care and any pertinent home health orders  Disposition:   Home    Planned Readmission: no     Discharge Statement:  I spent 45 minutes discharging the patient  This time was spent on the day of discharge  I had direct contact with the patient on the day of discharge  Greater than 50% of the total time was spent examining patient, answering all patient questions, arranging and discussing plan of care with patient as well as directly providing post-discharge instructions  Additional time then spent on discharge activities  Discharge Medications:  See after visit summary for reconciled discharge medications provided to patient and/or family        **Please Note: This note may have been constructed using a voice recognition system**

## 2022-05-09 NOTE — PROGRESS NOTES
Pt d/c home with friend  AVS reviewed with patient  Pt verbalized understanding  Medications reviewed and pt verbalized understanding  IV removed  Belongings sent home with pt

## 2022-05-09 NOTE — PHYSICAL THERAPY NOTE
PHYSICAL THERAPY TREATMENT NOTE  NAME:  Lauren Flores  DATE: 05/09/22    Length Of Stay: 14  Performed at least 2 patient identifiers during session: Name and Birthday    TREATMENT FLOW SHEET:      05/09/22 1422   PT Last Visit   PT Visit Date 05/09/22   Note Type   Note Type Treatment   Pain Assessment   Pain Assessment Tool 0-10   Pain Score No Pain  (no c/o pain at this time)   Restrictions/Precautions   Weight Bearing Precautions Per Order Yes   LUE Weight Bearing Per Order NWB   Braces or Orthoses   (sling for comfort)   Other Precautions Fall Risk   General   Chart Reviewed Yes   Additional Pertinent History Patient may not lift more than 1 lb with the left upper extremity, PT/OT pendulum exercises, active assisted range of motion, below shoulder height left upper extremity, Sling as needed for comfort  Pt educated on restrictions and ortho recommendations  Response to Previous Treatment Patient with no complaints from previous session  Cognition   Orientation Level Oriented to person;Oriented to place;Oriented to time  (partially to situation reporting "I have a broken arm")   Following Commands Follows one step commands without difficulty   Comments redirection needed at times  Subjective   Subjective "I need to get out of here  I have been here for 14 days  I had a lot of surgeries"   Bed Mobility   Rolling R 7  Independent   Rolling L 7  Independent   Supine to Sit 7  Independent   Sit to Supine 7  Independent   Additional Comments HOB flat without bedrail  no difficulty  Transfers   Sit to Stand 7  Independent   Stand to Sit 7  Independent   Stand pivot 7  Independent   Additional Comments no AD  sit<>stand without AD independent  spt without AD independent  no LOB  multi directional sudden turns at times during conversation without LOB      Ambulation/Elevation   Gait pattern   (slow otilia)   Gait Assistance 6  Modified independent   Assistive Device None  (declined use of spc at this time) Distance 280'x1, 80'x1 without AD modified independent  slow otilia  no evidence of LOB throughout  patient walking while talking, multidirectional turning  pt reports usually using or carrying his spc  Offered trial with facility spc, however pt declined and stated "I don't need it "   Stair Management Assistance 6  Modified independent   Additional items Increased time required   Stair Management Technique One rail R;Alternating pattern; Foreward   Number of Stairs 15   Ambulation/Elevation Additional Comments reciprocal pattern for satir completion  R HR  pt placed L hand on HR as guidance  Balance   Static Sitting Normal   Dynamic Sitting Normal   Static Standing Normal   Dynamic Standing Good   Ambulatory Good   Endurance Deficit   Endurance Deficit No   Activity Tolerance   Activity Tolerance Patient tolerated treatment well   Stanley Ku   Nurse Made Aware RN, Marcia Alanis made aware of patient modified independent in room and on unit  Assessment   Prognosis Good   Problem List   (D/C PT)   Barriers to Discharge Decreased caregiver support   Barriers to Discharge Comments lives alone, would need rides to outpatient PT   Goals   Patient Goals "Go home today"   PT Treatment Day 3   Plan   Treatment/Interventions   (D/C PT)   Progress Discontinue PT   Recommendation   PT Discharge Recommendation Home with outpatient rehabilitation   Equipment Recommended   (none antiicpated  pt reports having spc  )   Additional Comments standing in room, patient demonstrated proper technique with pendulum exercises-reviewed in sitting position as well  Per verbalized and demonstrated understanding  Demonstrated understanding of AAROM L UE not past shoulder height  reviewed 1lb wt lifting restriction  pt verbalized understanding      AM-PAC Basic Mobility Inpatient   Turning in Bed Without Bedrails 4   Lying on Back to Sitting on Edge of Flat Bed 4   Moving Bed to Chair 4   Standing Up From Chair 4   Walk in Room 4   Climb 3-5 Stairs 4   Basic Mobility Inpatient Raw Score 24   Basic Mobility Standardized Score 57 68   Highest Level Of Mobility   JH-HLM Goal 8: Walk 250 feet or more   JH-HLM Highest Level of Mobility 8: Walk 250 feet ot more   JH-HLM Goal Achieved Yes   Education   Education Provided Mobility training;Home exercise program   Patient Demonstrates acceptance/verbal understanding   End of Consult   Patient Position at End of Consult Bedside chair; All needs within reach     Pt pleasant throughout  Able to report home address  Taking notes throughout session  4 Item Dynamic Gait Index without AD  3/3 Gait level surface  3/3 Change in gait speed  2/3 Gait with horizontal head turns  3/3 Gait with vertical head turns  11/12 total score (less than 10/12 indicates increased risk of falls in elderly)    The patient's AM-PAC Basic Mobility Inpatient Short Form Raw Score is 24  A Raw score of greater than 16 suggests the patient may benefit from discharge to home  Please also refer to the recommendation of the Physical Therapist for safe discharge planning  Pt seen this date for treatment session  Patient has received skilled PT services consisting of bed mobility, transfers, ambulation, stairs, balance  Patient has progressed functional mobility to meet all STGs at this time  He demonstrated ability to ambulate without LOB increased distances  He completed stairs modified independent  He is at a low fall risk as indicated by score of 11/12 on 4 item DGI  He is performing functional mobility at independent to modified independent level with no further acute care services warranted at this time  Will D/C PT services  Should patient's status change, please re-consult            Oli Pena, PT,DPT

## 2022-05-09 NOTE — INCIDENTAL FINDINGS
The following findings require follow up:  Radiographic finding   Findin  No evidence of acute infarct, intracranial hemorrhage or mass    2   Enlarged, empty sella, a nonspecific finding which can be seen in the setting of radiographic intracranial hypertension     Follow up required: Neurology referral made   Follow up should be done within 2-4 week(s)    Please notify the following clinician to assist with the follow up:   Dr Vero Hernandez (PCP)

## 2022-05-09 NOTE — ASSESSMENT & PLAN NOTE
-Patient is complaining of chronic back pain  With multiple surgeries    - pt has recently reestablished with pain management, restarting suboxone 4/11 per PDMP review, prior to that last Rx 7/21'  -Suboxone discontinued as patient has refused day for last 24 hours without withdrawal symptoms  -Doing well off suboxone, will discharge without

## 2022-05-09 NOTE — ASSESSMENT & PLAN NOTE
-chronic back pain, evidenced by daily use of Suboxone 8mg three times a day, requiring continued regime in hospital    -PT OT recommends rehab placement, but since patient is on Suboxone, unable to find place around this area-case management on board,    -5/8 patient refuse Suboxone for last 24 hours no withdrawal symptoms will discontinue altogether patient agrees

## 2022-05-09 NOTE — PLAN OF CARE
Problem: PHYSICAL THERAPY ADULT  Goal: Performs mobility at highest level of function for planned discharge setting  See evaluation for individualized goals  Description: Treatment/Interventions: ADL retraining,Functional transfer training,LE strengthening/ROM,Elevations,Therapeutic exercise,Cognitive reorientation,Endurance training,Patient/family training,Equipment eval/education,Bed mobility,Gait training,Compensatory technique education,Spoke to nursing  Equipment Recommended:  (pt has cane)       See flowsheet documentation for full assessment, interventions and recommendations  Outcome: Completed  Note: Prognosis: Good  Problem List:  (D/C PT)  Assessment: Pt seen this date for treatment session  Patient has received skilled PT services consisting of bed mobility, transfers, ambulation, stairs, balance  Patient has progressed functional mobility to meet all STGs at this time  He demonstrated ability to ambulate without LOB increased distances  He completed stairs modified independent  He is at a low fall risk as indicated by score of 11/12 on 4 item DGI  He is performing functional mobility at independent to modified independent level with no further acute care services warranted at this time  Will D/C PT services  Should patient's status change, please re-consult  Barriers to Discharge: Decreased caregiver support  Barriers to Discharge Comments: lives alone, would need rides to outpatient PT     PT Discharge Recommendation: Home with outpatient rehabilitation          See flowsheet documentation for full assessment

## 2022-05-09 NOTE — PROGRESS NOTES
[address]  Progress Note - Tj Saliva 1960, 63 y o  male MRN: [id number]  Unit/Bed#: -[facility] Encounter: [id number]  Primary Care Provider: Tamanna Meredith MD   Date and time admitted to hospital: 4/25/2022 10:18 AM    * Toxic encephalopathy  Assessment & Plan  -Most likely secondary to Suboxone and Valium  -MRI of the brain also shows empty sella  -Mental status appears to have returned to baseline, will discuss with patient's family  -Resume home medications    -Neurology consult appreciated  -PT OT recommends to discharge patient on rehab-since patient is on Suboxone, which can affect the discharge planning and placement  Case management on board  - pt follow with Thegabriela Valdez to re-evaluate by neuropsychiatry for competency today  Goals of care, counseling/discussion  Assessment & Plan  5/8 Had a lengthy conversation with Carlene Vega today  We discussed his goals and obviously patient wants to go home independently  Discussed his relationship with his psychiatrist and a why he has been seeing him for all these years  Patient states that he has been seeing Dr Barrie Sandra for depression due to multiple back surgeries causing him pain  I asked if he has ever been diagnosed with any other psychiatric problems such as bipolar which patient reluctantly disagreed with the diagnosis but has been told he has bipolar  Discussed in length how now days everyone is on antidepressants and that there is no stigma behind mental psychiatric diseases these days  Also discussed patient's medication regimen and pain level  Patient states that he is feeling better and he really does not have any pain  He has refused to take Suboxone for the last 24 hours and states that he is not having any withdrawal symptoms  Will discontinue Suboxone altogether    In regards to keeping patient's pain under control will change Tylenol to standing doses as his LFTs have normalized  Will continue his Cymbalta  Will change the p r n  Valium to 5 mg q h s  To help with possible muscle spasms at night and with sleep  Will continue the trazodone 150 mg q h s  I believe changing the p r n  Valium to standing q h s  Will help patient's mentation in the morning as he states that during the morning he feels very foggy  We discussed having him be revaluated by neuropsych tomorrow morning for competency which patient is agreeable to  Patient states that he feels that he might get really nervous during the interview  I told him that I will give him 0 5 mg xanax in the morning to help him be calm during morning hours  5/9 Neuropsych consulted for capacity evaluation    Swelling of left upper extremity  Assessment & Plan    -Patient does have history of left humeral fracture-and left upper extremity on triangular sling for immobilization  venous duplex of left upper extremity negative  -DC IV fluid    Empty sella turcica (HCC)  Assessment & Plan  -Incidental finding  -TSH normal, does not have any visual change  No electrolyte imbalance  No signs of symptoms of acromegaly,  -Patient will need outpatient follow-up with Neurology and Ophthalmology-as per Neurology recommendation    Sacral decubitus ulcer, stage III ([facility] [address] )  Assessment & Plan  -Continue wound care  -Follow wound culture  -Wound culture shows E coli, Gram-positive rods, Gram-positive cocci-sensitive to Augmentin, started Augmentin for 7 days   Through 5/6  -wound care evaluation- addition of santyl daily left buttock, to replace maxorb Ag  - continue Hydrea carotid to bilateral sacrum, buttock and bilateral heels  - Q2 turns, offload heels, air cushion when out of bed    Opioid dependence (HCC)  Assessment & Plan  -chronic back pain, evidenced by daily use of Suboxone 8mg three times a day, requiring continued regime in hospital    -PT OT recommends rehab placement, but since patient is on Suboxone, unable to find place around this area-case management on board,    -5/8 patient refuse Suboxone for last 24 hours no withdrawal symptoms will discontinue altogether patient agrees      Left humeral fracture  Assessment & Plan  Patient had left humeral fracture diagnosed on 04/04/2022 on an ED visit  Still present on imaging  -Persistent medially displaced previously acute left humeral head and neck fracture  · Orthopedic recommendation appreciated:Patient may not lift more than 1 lb with the left upper extremity  · PT/OT pendulum exercises, active assisted range of motion, below shoulder height left upper extremity  PT OT recommends rehab placement, but since patient is on Suboxone, unable to find place around this area-case management on board,      Closed fracture of multiple ribs of left side with routine healing  Assessment & Plan  -CT chest and pelvis shows There are fractures of the left 6, 7th, 8th, and 9th ribs with reactive bone formation present suggesting that these fractures are subacute or less likely chronic   -Continue pain control  These do not appear to be acute fractures and hence continue supportive care  -PT OT recommends rehab placement, but since patient is on Suboxone, unable to find place around this area  -case management on board,  -patient adamant about going home with services, was evaluated by neuropsychiatry 4/28 in deemed not competent in making medical decisions at this point    Back pain  Assessment & Plan  -Patient is complaining of chronic back pain  With multiple surgeries    - pt has recently reestablished with pain management, restarting suboxone 4/11 per PDMP review, prior to that last Rx 7/21'  -Suboxone discontinued as patient has refused day for last 24 hours without withdrawal symptoms    Other hyperlipidemia  Assessment & Plan  -Continue statin therapy    Bipolar affective disorder [facility])  Assessment & Plan  -Continue home regimen of Valium, Cymbalta and Latuda  -Psychiatry consult appreciated    -medication changes made see goals of care counseling      Ecchymosis-resolved as of 2022  Assessment & Plan  Affecting left flank, left lateral chest, left upper thigh-remained stable  Resume heparin          VTE Pharmacologic Prophylaxis: VTE Score: 2 Low Risk (Score 0-2) - Encourage Ambulation  Patient Centered Rounds: I performed bedside rounds with nursing staff today  Discussions with Specialists or Other Care Team Provider: case management    Education and Discussions with Family / Patient: Patient declined call to   Time Spent for Care: 30 minutes  More than 50% of total time spent on counseling and coordination of care as described above  Current Length of Stay: 14 day(s)  Current Patient Status: Inpatient   Certification Statement: The patient will continue to require additional inpatient hospital stay due to pending neuropsych evaluation  Discharge Plan: depending on neuropsych evaluation    Code Status: Level 1 - Full Code    Subjective:   Patient seen examined at bedside  Discussed the coming neuropsych evaluation  Patient states that he is nervous about the evaluation  Advised them that he has nothing to worry about and just be yourself/answer questions honestly  Patient hoping to be discharged if he passes neuropsych evaluation  Objective:     Vitals:   Temp (24hrs), Av 1 °F (36 7 °C), Min:97 7 °F (36 5 °C), Max:98 4 °F (36 9 °C)    Temp:  [97 7 °F (36 5 °C)-98 4 °F (36 9 °C)] 97 7 °F (36 5 °C)  HR:  [80-87] 80  Resp:  [19] 19  BP: (112)/(64-65) 112/65  SpO2:  [94 %-96 %] 96 %  Body mass index is 26 01 kg/m²  Input and Output Summary (last 24 hours): Intake/Output Summary (Last 24 hours) at 2022 1228  Last data filed at 2022 5886  Gross per 24 hour   Intake 480 ml   Output 1775 ml   Net -1295 ml       Physical Exam:   Physical Exam  Vitals reviewed  HENT:      Head: Normocephalic and atraumatic        Right Ear: External ear normal       Left Ear: External ear normal       Nose: Nose normal       Mouth/Throat:      Mouth: Mucous membranes are moist       Pharynx: Oropharynx is clear  Eyes:      Extraocular Movements: Extraocular movements intact  Cardiovascular:      Rate and Rhythm: Normal rate and regular rhythm  Pulses: Normal pulses  Heart sounds: Normal heart sounds  Pulmonary:      Effort: Pulmonary effort is normal       Breath sounds: Normal breath sounds  Abdominal:      General: Abdomen is flat  Palpations: Abdomen is soft  Tenderness: There is no abdominal tenderness  Musculoskeletal:         General: Normal range of motion  Cervical back: Normal range of motion  Skin:     General: Skin is warm and dry  Neurological:      General: No focal deficit present  Mental Status: He is alert  Mental status is at baseline  Psychiatric:         Mood and Affect: Mood normal          Behavior: Behavior normal           Additional Data:     Labs:  Results from last 7 days   Lab Units 05/05/22 0551 05/04/22 0443 05/04/22 0443   WBC Thousand/uL 6 92   < > 4 59   HEMOGLOBIN g/dL 10 9*   < > 8 7*   HEMATOCRIT % 33 4*   < > 27 5*   PLATELETS Thousands/uL 405*   < > 342   NEUTROS PCT %  --   --  59   LYMPHS PCT %  --   --  23   MONOS PCT %  --   --  10   EOS PCT %  --   --  8*    < > = values in this interval not displayed       Results from last 7 days   Lab Units 05/05/22 0551 05/04/22 0443 05/04/22 0443 05/03/22  0538 05/03/22  0538   SODIUM mmol/L 138   < > 140   < > 141   POTASSIUM mmol/L 4 0   < > 3 9   < > 3 7   CHLORIDE mmol/L 102   < > 106   < > 106   CO2 mmol/L 32   < > 32   < > 30   BUN mg/dL 7   < > 6   < > 6   CREATININE mg/dL 0 73   < > 0 63   < > 0 64   ANION GAP mmol/L 4   < > 2*   < > 5   CALCIUM mg/dL 8 7   < > 7 8*   < > 7 7*   ALBUMIN g/dL  --   --  2 4*   < > 2 5*   TOTAL BILIRUBIN mg/dL  --   --   --   --  0 22   ALK PHOS U/L  --   --   --   --  99   ALT U/L  --   --   -- --  10*   AST U/L  --   --   --   --  18   GLUCOSE RANDOM mg/dL 111   < > 104   < > 101    < > = values in this interval not displayed  Results from last 7 days   Lab Units 05/03/22  0633   INR  0 97             Results from last 7 days   Lab Units 05/03/22  0538   PROCALCITONIN ng/ml <0 05       Lines/Drains:  Invasive Devices  Report    None                       Imaging: No pertinent imaging reviewed  Recent Cultures (last 7 days):         Last 24 Hours Medication List:   Current Facility-Administered Medications   Medication Dose Route Frequency Provider Last Rate    acetaminophen  650 mg Oral Q6H Erika Viveros DO      ALPRAZolam  0 5 mg Oral Daily Erika Viveros DO      aspirin  81 mg Oral Daily Herbert Segovia MD      atorvastatin  40 mg Oral QPM Herbert Segovia MD      calcium carbonate  1,000 mg Oral Daily PRN Herbert Segovia MD      collagenase   Topical Daily NINI Whipple      diazepam  5 mg Oral HS Erika Viveros DO      DULoxetine  120 mg Oral Daily Herbert Segovia MD      heparin (porcine)  5,000 Units Subcutaneous [facility] Evin Peralta MD      ibuprofen  600 mg Oral Q6H PRN Evin Peralta MD      lidocaine  1 patch Topical Daily Evin Peralta MD      lurasidone  20 mg Oral HS Herbert Segovia MD      nicotine  1 patch Transdermal Daily Herbert Segovia MD      ondansetron  4 mg Intravenous Q6H PRN Herbert Segovia MD      saccharomyces boulardii  250 mg Oral BID Evin Peralta MD      tamsulosin  0 4 mg Oral Daily With Dinner Herbert Segovia MD      traZODone  150 mg Oral HS Erika Viveros DO          Today, Patient Was Seen By: Donna Vang DO    **Please Note: This note may have been constructed using a voice recognition system  **

## 2022-05-10 ENCOUNTER — TRANSITIONAL CARE MANAGEMENT (OUTPATIENT)
Dept: FAMILY MEDICINE CLINIC | Facility: CLINIC | Age: 62
End: 2022-05-10

## 2022-05-10 NOTE — CONSULTS
Consultation - Neuropsychology/Psychology Department  Joie Tran 63 y o  male MRN: [id number]  Unit/Bed#: -01 Encounter: [id number]        Reason for Consultation:  Joie Tran is a 63y o  year old male who was referred for a Neuropsychological Exam to assess cognitive functioning and comment on capacity to make informed medical decisions  History of Present Illness  Admitted with confusion    Physician Requesting Consult: No att  providers found    PROBLEM LIST:  Patient Active Problem List   Diagnosis    Class 1 obesity due to excess calories without serious comorbidity with body mass index (BMI) of 30 0 to 30 9 in adult    Bipolar affective disorder ([facility] [address] )    Other hyperlipidemia    Memory loss    Closed fracture of head of left humerus    Acute pain of left shoulder    Closed 2-part displaced fracture of surgical neck of left humerus    Back pain    Toxic encephalopathy    Closed fracture of multiple ribs of left side with routine healing    Left humeral fracture    Opioid dependence ([facility] [address] )    Sacral decubitus ulcer, stage III (HCC)    Goals of care, counseling/discussion    Empty sella turcica (HCC)    Swelling of left upper extremity         Historical Information   Past Medical History:   Diagnosis Date    Narcotic dependence ([facility] [address] )      Past Surgical History:   Procedure Laterality Date    JOINT REPLACEMENT Bilateral     SPINAL FUSION       Social History   Social History     Substance and Sexual Activity   Alcohol Use Never     Social History     Substance and Sexual Activity   Drug Use Never     Social History     Tobacco Use   Smoking Status Current Every Day Smoker    Packs/day: 0 25    Years: 20 00    Pack years: 5 00    Types: Cigarettes   Smokeless Tobacco Never Used     Family History:   Family History   Problem Relation Age of Onset    Arthritis Mother        Meds/Allergies   current meds:   No current facility-administered medications for this encounter  No Known Allergies      Family and Social Support:   No data recorded    Behavioral Observations: Alert, oriented x 3, cooperative; affect was pleasant and patient denied depressed mood and anxiety; appeared t have adequate understanding of medical history;    Cognitive Examination    General Cognitive Functioning MMSE = Deficits in working memory; Attention/Concentration Auditory Selective Attention = Average; Information Processing Speed = Within Normal Limits    Frontal Systems/Executive Functioning Mental Flexibility/Cognitive Control = Average; Working Memory = Impaired Abstract Reasoning = Average; Generative Ability = Impaired, Commonsense Reasoning and Judgement = Average    Language Functioning Phonemic Fluency = Impaired; Semantic Retrieval = Average; Comprehension of Complex Ideational Material = Average;     Memory Functioning Narrative Recall - Short Delay = Mildly Impaired; Long Delay Narrative Recall = Impaired     Visuo-Spatial Abilities Not Assessed    Functional Knowledge  Health & Safety Knowledge = Average;     Summary/Impression:  Results of Neuropsychological Exam revealed cognitive deficits in recall, working memory and generative ability  Profile is consistent with Mild Neurocognitive Disorder  On a measure assessing awareness of personal health status and ability to evaluate health problems, handle medical emergencies and take safety precautions, patient performed within normal limits  At this time, patient appears to have capacity to make informed medical decisions

## 2022-05-20 ENCOUNTER — TELEPHONE (OUTPATIENT)
Dept: PSYCHIATRY | Facility: CLINIC | Age: 62
End: 2022-05-20

## 2022-05-20 NOTE — TELEPHONE ENCOUNTER
Spoke to PT, inform pt of the wait list and pt referral  PT at this time is not interested in services  PT inform me pt had 27 surgeries over he life time and is cable of taken care himself  Inform PT we offer talk therapy and med mgmt   PT will call back if still interested in services

## 2022-05-23 ENCOUNTER — TELEPHONE (OUTPATIENT)
Dept: FAMILY MEDICINE CLINIC | Facility: CLINIC | Age: 62
End: 2022-05-23

## 2022-05-23 ENCOUNTER — HOSPITAL ENCOUNTER (EMERGENCY)
Facility: HOSPITAL | Age: 62
Discharge: HOME | End: 2022-05-23
Attending: EMERGENCY MEDICINE | Admitting: EMERGENCY MEDICINE
Payer: MEDICARE

## 2022-05-23 VITALS
DIASTOLIC BLOOD PRESSURE: 81 MMHG | RESPIRATION RATE: 18 BRPM | HEART RATE: 98 BPM | TEMPERATURE: 98.1 F | SYSTOLIC BLOOD PRESSURE: 117 MMHG | BODY MASS INDEX: 23.62 KG/M2 | WEIGHT: 174.16 LBS | OXYGEN SATURATION: 97 %

## 2022-05-23 DIAGNOSIS — Z51.89 VISIT FOR WOUND CHECK: Primary | ICD-10-CM

## 2022-05-23 PROCEDURE — 99283 EMERGENCY DEPT VISIT LOW MDM: CPT

## 2022-05-23 PROCEDURE — 99284 EMERGENCY DEPT VISIT MOD MDM: CPT | Performed by: EMERGENCY MEDICINE

## 2022-05-23 RX ORDER — ALPRAZOLAM 0.5 MG/1
0.5 TABLET ORAL ONCE
Status: COMPLETED | OUTPATIENT
Start: 2022-05-23 | End: 2022-05-23

## 2022-05-23 RX ADMIN — ALPRAZOLAM 0.5 MG: 0.5 TABLET ORAL at 08:53

## 2022-05-23 NOTE — ED PROVIDER NOTES
History  Chief Complaint   Patient presents with    Wound Check     Presents due to continuing chronic wound, pt states "he has a medical background and there is puss everywhere"     Patient has a chronic wound in his lower back region on the left side  States that was pus coming out today  No fevers or chills  No nausea vomiting or diarrhea  States he saw the pus when his aide took a picture of his back  Eating and drinking well  History provided by:  Patient   used: No    Wound Check   He was treated in the ED more than 14 days ago  There has been no treatment since the wound repair  Maximum temperature: No fever  Wound drainage status: States that was pus on a picture  There is no redness present  There is no swelling present  There is no pain present  Prior to Admission Medications   Prescriptions Last Dose Informant Patient Reported? Taking?    ALPRAZolam (XANAX) 0 5 mg tablet   No No   Sig: Take 1 tablet (0 5 mg total) by mouth in the morning for 10 days   DULoxetine (CYMBALTA) 60 mg delayed release capsule   Yes No   Sig: Take 120 mg by mouth daily   aspirin 81 mg chewable tablet   No No   Sig: Chew 1 tablet (81 mg total) daily   atorvastatin (LIPITOR) 20 mg tablet   Yes No   Sig: Take 20 mg by mouth daily   collagenase (SANTYL) ointment   No No   Sig: Apply topically daily   diazepam (VALIUM) 5 mg tablet   No No   Sig: Take 1 tablet (5 mg total) by mouth daily at bedtime for 10 days   lidocaine (LIDODERM) 5 %   No No   Sig: Apply 1 patch topically daily for 10 days Remove & Discard patch within 12 hours or as directed by MD   lurasidone (Latuda) 40 mg tablet   Yes No   Sig: Take 20 mg by mouth daily at bedtime   nicotine (NICODERM CQ) 7 mg/24hr TD 24 hr patch   No No   Sig: Place 1 patch on the skin daily   tamsulosin (Flomax) 0 4 mg   Yes No   Sig: Take 0 4 mg by mouth daily with dinner   traZODone (DESYREL) 150 mg tablet   Yes No   Sig: Take 150 mg by mouth daily at bedtime       Facility-Administered Medications: None       Past Medical History:   Diagnosis Date    Narcotic dependence [facility])        Past Surgical History:   Procedure Laterality Date    JOINT REPLACEMENT Bilateral     SPINAL FUSION         Family History   Problem Relation Age of Onset    Arthritis Mother      I have reviewed and agree with the history as documented  E-Cigarette/Vaping    E-Cigarette Use Never User      E-Cigarette/Vaping Substances     Social History     Tobacco Use    Smoking status: Current Every Day Smoker     Packs/day: 0 25     Years: 20 00     Pack years: 5 00     Types: Cigarettes    Smokeless tobacco: Never Used   Vaping Use    Vaping Use: Never used   Substance Use Topics    Alcohol use: Never    Drug use: Never       Review of Systems   Constitutional: Negative for chills and fever  HENT: Negative for ear pain, hearing loss, sore throat, trouble swallowing and voice change  Eyes: Negative for pain and discharge  Respiratory: Negative for cough, shortness of breath and wheezing  Cardiovascular: Negative for chest pain and palpitations  Gastrointestinal: Negative for abdominal pain, blood in stool, constipation, diarrhea, nausea and vomiting  Genitourinary: Negative for dysuria, flank pain, frequency and hematuria  Musculoskeletal: Negative for joint swelling, neck pain and neck stiffness  Skin: Negative for rash and wound  Neurological: Negative for dizziness, seizures, syncope, facial asymmetry and headaches  Psychiatric/Behavioral: Negative for hallucinations, self-injury and suicidal ideas  All other systems reviewed and are negative  Physical Exam  Physical Exam  Constitutional:       General: He is not in acute distress  Appearance: Normal appearance  He is not ill-appearing  HENT:      Head: Normocephalic and atraumatic        Right Ear: External ear normal       Left Ear: External ear normal       Nose: Nose normal  Mouth/Throat:      Mouth: Mucous membranes are moist    Eyes:      Extraocular Movements: Extraocular movements intact  Pupils: Pupils are equal, round, and reactive to light  Cardiovascular:      Rate and Rhythm: Normal rate and regular rhythm  Pulmonary:      Effort: Pulmonary effort is normal  No respiratory distress  Breath sounds: Normal breath sounds  Abdominal:      General: Abdomen is flat  Bowel sounds are normal  There is no distension  Palpations: Abdomen is soft  Tenderness: There is no abdominal tenderness  Musculoskeletal:         General: No swelling or tenderness  Cervical back: Normal range of motion and neck supple  Comments: Wound noted  See attacks picture granulation tissue noted  No pus expressed  No drainage  No surrounding erythema or warmth  No fluctuance or induration  Skin:     General: Skin is warm and dry  Capillary Refill: Capillary refill takes less than 2 seconds  Neurological:      General: No focal deficit present  Mental Status: He is alert and oriented to person, place, and time     Psychiatric:         Mood and Affect: Mood normal          Behavior: Behavior normal          Vital Signs  ED Triage Vitals [05/23/22 0836]   Temperature Pulse Respirations Blood Pressure SpO2   98 1 °F (36 7 °C) 98 18 117/81 97 %      Temp Source Heart Rate Source Patient Position - Orthostatic VS BP Location FiO2 (%)   Temporal Monitor Lying Left arm --      Pain Score       3           Vitals:    05/23/22 0836   BP: 117/81   Pulse: 98   Patient Position - Orthostatic VS: Lying         Visual Acuity      ED Medications  Medications   ALPRAZolam (XANAX) tablet 0 5 mg (has no administration in time range)       Diagnostic Studies  Results Reviewed     None                 No orders to display              Procedures  Procedures         ED Course                               SBIRT 22yo+    Flowsheet Row Most Recent Value   SBIRT (25 yo +)    In order to provide better care to our patients, we are screening all of our patients for alcohol and drug use  Would it be okay to ask you these screening questions? Unable to answer at this time Filed at: 05/23/2022 1792                    MDM          Disposition  Final diagnoses:   Visit for wound check     Time reflects when diagnosis was documented in both MDM as applicable and the Disposition within this note     Time User Action Codes Description Comment    5/23/2022  8:44 AM Laura Gregory [[id number]] Visit for wound check       ED Disposition     ED Disposition   Discharge    Condition   Stable    Date/Time   Mon May 23, 2022  8:44 AM    Comment   Isatu Odor discharge to home/self care  Follow-up Information    None         Patient's Medications   Discharge Prescriptions    No medications on file       No discharge procedures on file      PDMP Review     None          ED Provider  Electronically Signed by           Rosalba Campos MD  05/23/22 9822

## 2022-05-23 NOTE — TELEPHONE ENCOUNTER
Nurse from [facility] called asking if you would write initial orders for home health services, nursing, PT and OT? I advised her we only saw the patient one time on 04/05/22, he was a no show at his 5/16/22 hospital f/u appt and is now currently in the ED and will ask if you would be willing to write orders  Please advise       Ph: [phone]  Fax: [phone]

## 2022-05-23 NOTE — ED NOTES
Cyndie changed p/u time to [address], [address] [place] [address]  05/23/22 5683

## 2022-05-23 NOTE — ED NOTES
As per Dr Gifford, dry non adherant pad with 4x4 placed on patients pressure wound to sacrum  No drainage noted, appears to be healing well       Facundo Casas RN  05/23/22 9453

## 2022-05-23 NOTE — DISCHARGE INSTRUCTIONS
Call as soon as possible to arrange wound care appointment:    [facility] Scheduling: Wound Care [phone]    [facility] - [address], [address]   To make appointment call [phone] number is [phone]   Hours Monday - Friday 730 am - 330 pm

## 2022-05-23 NOTE — ED NOTES
[facility] arrived to transport patient home       Huseyin Nicholas RN  05/23/22 6154

## 2022-05-23 NOTE — TELEPHONE ENCOUNTER
Akira Page at [facility] advised that patient needs to be seen by Dr Leah Light before home health orders can be written

## 2022-05-23 NOTE — ED NOTES
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

## 2022-05-23 NOTE — ED NOTES
Spoke with MATIAS regarding transport home for patient  Patient states he is able to ambulate with cane, however it is painful        Dorina Chen RN  05/23/22 2931

## 2022-05-23 NOTE — ED NOTES
Discharge instructions reviewed with patient, made aware he has an appt with Dr Saxena today, and states he cancelled appointment already because he was going to be seen here  Patient made aware it is important to follow with PCP and wound for this wound on his sacrum  Patient sitting at side of bed to get dressed, made aware Cyndie will be here around 945am for transport home       Clarice Tovar RN  05/23/22 0937

## 2022-05-23 NOTE — ED NOTES
Patient updated on time change for pickup by [place]       iY Castro, RN  05/23/22 1007

## 2022-05-24 ENCOUNTER — TELEPHONE (OUTPATIENT)
Dept: OTHER | Facility: OTHER | Age: 62
End: 2022-05-24

## 2022-05-24 NOTE — TELEPHONE ENCOUNTER
Please call patient after 9am today    He states he has to speak with someone in the office as he needs help sorting out all his upcoming appointments as well as needing PCP to order/fill out paperwork for therapy, etc

## 2022-05-25 ENCOUNTER — TELEPHONE (OUTPATIENT)
Dept: FAMILY MEDICINE CLINIC | Facility: CLINIC | Age: 62
End: 2022-05-25

## 2022-05-25 NOTE — TELEPHONE ENCOUNTER
Patient has been calling multiple times  Crying and begging for help stating he needs in home wound care as he does not drive  I advised him that I am trying to help him and I left a message for [facility] to call him to schedule appt  Patient asked to be seen in our office sooner then his scheduled appt on 6/14/22  I asked him when can he come in since he is unable to drive, he asked that I call his son to schedule the appt  I advised the patient I will call his son, Sejal Araujo as soon as we hang up  Patient was emotional and crying stating he feels like he is losing his mind  I called and left a message for son to call our office ASAP, we can offer patient same day appt with our PA, Radha Hameed  While on the phone my staff member received a call from [facility] stating they will not take patient as a client due to harrassment  They recommend patient see a psych nurse  I informed staff to document all incoming calls regarding this patient

## 2022-05-25 NOTE — TELEPHONE ENCOUNTER
Received call from [facility]- they received a call from the patient requesting home health services  They do not take patient's insurance

## 2022-05-25 NOTE — TELEPHONE ENCOUNTER
Patient called asking for assistance in scheduling wound care appt  I placed call to [phone] [facility] @ [phone] and left a message requesting they call patient ASAP to schedule appt

## 2022-05-25 NOTE — TELEPHONE ENCOUNTER
yKrie Lott, a care navigator from [facility] stated that he will need a wound care referral when he comes in for his appointment on 5/26   And she just wanted to verify that he does have an upcoming appointment

## 2022-05-25 NOTE — TELEPHONE ENCOUNTER
Received call from [address] [place] [address], they called patient  Patient is insisting on home wound care  [facility] only does in office appts

## 2022-05-25 NOTE — TELEPHONE ENCOUNTER
[facility] dismissed him from their practice due to harassment of their staff  I called [address] [facility] to verify they take his insurance and they do, they will just need the referral placed once he is seen in office

## 2022-05-25 NOTE — TELEPHONE ENCOUNTER
Patients son, Samantha Rosas, called the office back  Briefly explained to him that his dad has been calling us multiple times seeming very confused,crying out for help and that he called other home health agencies in the area, to which they reached out to us  Samantha Rosas would like him evaluated ASAP so I did reschedule his appointment for tomorrow with Bisi Melgar expressed concerns and would like to go forward with either home health or office of aging  He was not aware his dad was calling our office constantly and  apologized for this behavior

## 2022-05-26 ENCOUNTER — OFFICE VISIT (OUTPATIENT)
Dept: FAMILY MEDICINE CLINIC | Facility: CLINIC | Age: 62
End: 2022-05-26
Payer: MEDICARE

## 2022-05-26 ENCOUNTER — TELEPHONE (OUTPATIENT)
Dept: FAMILY MEDICINE CLINIC | Facility: CLINIC | Age: 62
End: 2022-05-26

## 2022-05-26 VITALS
HEART RATE: 74 BPM | DIASTOLIC BLOOD PRESSURE: 62 MMHG | WEIGHT: 164 LBS | OXYGEN SATURATION: 98 % | SYSTOLIC BLOOD PRESSURE: 118 MMHG | HEIGHT: 72 IN | BODY MASS INDEX: 22.21 KG/M2 | TEMPERATURE: 98 F

## 2022-05-26 DIAGNOSIS — R26.2 AMBULATORY DYSFUNCTION: ICD-10-CM

## 2022-05-26 DIAGNOSIS — E23.6 EMPTY SELLA TURCICA (HCC): ICD-10-CM

## 2022-05-26 DIAGNOSIS — L89.153 SACRAL DECUBITUS ULCER, STAGE III (HCC): Primary | ICD-10-CM

## 2022-05-26 DIAGNOSIS — R41.3 MEMORY LOSS: ICD-10-CM

## 2022-05-26 DIAGNOSIS — G92.9 TOXIC ENCEPHALOPATHY: ICD-10-CM

## 2022-05-26 PROBLEM — M79.89 SWELLING OF LEFT UPPER EXTREMITY: Status: RESOLVED | Noted: 2022-04-30 | Resolved: 2022-05-26

## 2022-05-26 PROBLEM — M25.512 ACUTE PAIN OF LEFT SHOULDER: Status: RESOLVED | Noted: 2022-04-13 | Resolved: 2022-05-26

## 2022-05-26 PROBLEM — S42.302A LEFT HUMERAL FRACTURE: Status: RESOLVED | Noted: 2022-04-25 | Resolved: 2022-05-26

## 2022-05-26 PROBLEM — S42.292A CLOSED FRACTURE OF HEAD OF LEFT HUMERUS: Status: RESOLVED | Noted: 2022-04-05 | Resolved: 2022-05-26

## 2022-05-26 PROCEDURE — 99214 OFFICE O/P EST MOD 30 MIN: CPT | Performed by: NURSE PRACTITIONER

## 2022-05-26 RX ORDER — CEPHALEXIN 500 MG/1
500 CAPSULE ORAL DAILY
COMMUNITY
Start: 2022-05-19 | End: 2022-06-01 | Stop reason: SDUPTHER

## 2022-05-26 NOTE — TELEPHONE ENCOUNTER
[address] gave them patients name and   They will be calling back with an intake person to get further information

## 2022-05-26 NOTE — TELEPHONE ENCOUNTER
Referral placed for home health services  Snapshot, notes, and office visit summary faxed along with insurance info to [place] , 5/26/22

## 2022-05-26 NOTE — PROGRESS NOTES
Assessment/Plan:       Diagnoses and all orders for this visit:    Sacral decubitus ulcer, stage III ([facility] [address] )  -     EXTERNAL Referral to [facility]; Future    Empty sella turcica ([facility] [address] )    Memory loss  -     EXTERNAL Referral to [facility]; Future    Toxic encephalopathy    Ambulatory dysfunction  -     EXTERNAL Referral to Home Health; Future    Other orders  -     cephalexin (KEFLEX) 500 mg capsule; Take 500 mg by mouth in the morning      Will have the office reach out to the office of aging to discuss care services - he is aware that he will have an intake person come to evaluate him  Referral also placed to home health - will reach out to [facility] for services for wound care and physical therapy  Telfa dressings provided to change in 2 days  He will return to our office on Wednesday for me to check his wound again  Subjective:      Patient ID: Sunil Guillermo is a 63 y o  male  Here today accompanied by his son - more recent hx of a sacral decubitus ulcer - reports he had it from a past hosptialization  It is in the earlier stages of healing and he is currently taking antibiotics  He is in need of wound care services but cannot drive to appointments  He has been distressed with this and is worried he is not going to get the care to heal   Hx of dementia vs psychosis  Was hospitalized in April due to mental status changes but it was thought to be toxic encephalopathy due to suboxone and diazepam use - he was on suboxone in the past but no longer takes it  He receives his diazepam from Dr Pamela Ramey  His son is concerned that he does not have the means to care for himself alone as he lives by himself  His son reports he needs help with groceries, daily tasks, and remembering his appointments  The patient also agrees with the need for more in-home care options    The son states he has been in contact with the office of aging but notes he has "gotten nowhere "      He has had other home health agencies in the home but there were some issues with his conduct and they are no longer offering their services to him - see additional chart notes  He is with ambulatory issues - uses a can with ambulation and cannot drive himself  He is with some memory issues and is with difficulty keeping track of his appointments  The following portions of the patient's history were reviewed and updated as appropriate: allergies, current medications, past family history, past medical history, past social history, past surgical history and problem list     Review of Systems   Constitutional: Negative  Respiratory: Negative  Cardiovascular: Negative  Musculoskeletal:        Ambulatory issues  Skin: Positive for wound  Neurological: Negative  All other systems reviewed and are negative  Objective:      /62 (BP Location: Left arm, Patient Position: Sitting, Cuff Size: Standard)   Pulse 74   Temp 98 °F (36 7 °C)   Ht 6' (1 829 m)   Wt 74 4 kg (164 lb)   SpO2 98%   BMI 22 24 kg/m²          Physical Exam  Pulmonary:      Effort: Pulmonary effort is normal    Skin:         Neurological:      Mental Status: He is oriented to person, place, and time  Psychiatric:         Mood and Affect: Mood is anxious

## 2022-06-01 ENCOUNTER — TELEPHONE (OUTPATIENT)
Dept: FAMILY MEDICINE CLINIC | Facility: CLINIC | Age: 62
End: 2022-06-01

## 2022-06-01 ENCOUNTER — OFFICE VISIT (OUTPATIENT)
Dept: FAMILY MEDICINE CLINIC | Facility: CLINIC | Age: 62
End: 2022-06-01
Payer: MEDICARE

## 2022-06-01 VITALS
TEMPERATURE: 98 F | BODY MASS INDEX: 22.84 KG/M2 | SYSTOLIC BLOOD PRESSURE: 140 MMHG | HEIGHT: 72 IN | WEIGHT: 168.6 LBS | DIASTOLIC BLOOD PRESSURE: 80 MMHG | HEART RATE: 105 BPM | OXYGEN SATURATION: 98 %

## 2022-06-01 DIAGNOSIS — L89.153 SACRAL DECUBITUS ULCER, STAGE III (HCC): Primary | ICD-10-CM
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
## 2022-06-01 DIAGNOSIS — S01.81XD LACERATION OF OTHER PART OF HEAD WITHOUT FOREIGN BODY, SUBSEQUENT ENCOUNTER: ICD-10-CM

## 2022-06-01 PROCEDURE — 99213 OFFICE O/P EST LOW 20 MIN: CPT | Performed by: NURSE PRACTITIONER

## 2022-06-01 RX ORDER — CEPHALEXIN 500 MG/1
500 CAPSULE ORAL EVERY 12 HOURS SCHEDULED
Qty: 14 CAPSULE | Refills: 0 | Status: SHIPPED | OUTPATIENT
Start: 2022-06-01 | End: 2022-06-08

## 2022-06-01 NOTE — PROGRESS NOTES
Assessment/Plan:       Diagnoses and all orders for this visit:    Sacral decubitus ulcer, stage III ([facility])  -     cephalexin (KEFLEX) 500 mg capsule; Take 1 capsule (500 mg total) by mouth every 12 (twelve) hours for 7 days    Laceration of other part of head without foreign body, subsequent encounter    Fall, initial encounter      He refuses to go to the hospital for further evaluation of his head injury post fall  There is a 1 5 cm laceration in the left eyebrow region - the bleeding is controlled  The head laceration would benefit from 2-3 sutures but steri-strips applied at this visit  He was instructed to not remove the steri-strips until they came off on their own  His sacral ulcer is in the stages of healing it appears  He would still benefit from wound care though  I have extended his antibiotic for another 7 days  I am unsure if he is actually taking them prior to this visit  Will have our office reach out to home health regarding if they received our information and orders for wound care and physical therapy  Subjective:      Patient ID: Huang Lala is a 63 y o  male  Here today accompanied by his son - he is here for a wound recheck of his sacral decubiti  He has recently met with the office of aging this morning regarding services to help him with needed care  He is also with no regular transportation - he is asking for home health to help with wound care and he is also needing physical therapy  Referral placed for home health at his visit last week  He also reports he fell this morning and his the side of his head  He suffered a laceration to the outer portion of his left eyebrow  He is refusing to go to the hospital for further care          The following portions of the patient's history were reviewed and updated as appropriate: allergies, current medications, past family history, past medical history, past social history, past surgical history and problem list     Review of Systems   Skin: Positive for wound  Neurological: Negative  All other systems reviewed and are negative  Objective:      /80 (BP Location: Right arm, Patient Position: Sitting, Cuff Size: Standard)   Pulse 105   Temp 98 °F (36 7 °C)   Ht 6' (1 829 m)   Wt 76 5 kg (168 lb 9 6 oz)   SpO2 98%   BMI 22 87 kg/m²          Physical Exam  Pulmonary:      Effort: Pulmonary effort is normal    Skin:         Neurological:      Mental Status: He is alert and oriented to person, place, and time

## 2022-06-01 NOTE — TELEPHONE ENCOUNTER
Ashanti Goldstein from [facility] stopped in to the office to ask about the patient and stated they will go to his house twice a week for wound care   They cannot go more than that due to staff shortage and she said they can get there for an initial visit as early as 6/4

## 2022-06-01 NOTE — TELEPHONE ENCOUNTER
Just spoke to Grisel and he is with his father now and stated he is fine and he does not seem to be bleeding so he will bring him in for his appointment today at 10:45

## 2022-06-01 NOTE — TELEPHONE ENCOUNTER
Carlene Vega called this morning to inform us that he fell down his steps and is bleeding from his head  He states he put gauze on it but it is still bleeding  He stated "Im not going to the ED because I have an appointment with your office for my wound " I advised patient that he needs to be evaluated in the emergency room due to him having a fall and a possible head injury  He was concerned that his wound care would be delayed, but I advised him that they will take care of it and his head injury at the ED  Patient continued to argue with me over going to the ED  He wanted us to suture his head, I advised him that we do not do suturing here in office and he would need further testing to evaluate his head injury  I advised Carlene Vega to have his son call us back to update us and to reschedule his appointment

## 2022-06-07 ENCOUNTER — TELEPHONE (OUTPATIENT)
Dept: FAMILY MEDICINE CLINIC | Facility: CLINIC | Age: 62
End: 2022-06-07

## 2022-06-07 NOTE — TELEPHONE ENCOUNTER
PT from [address] called and stated they went to his home for the initial visit and they will be going there once weekly for 8 weeks

## 2022-06-14 ENCOUNTER — OFFICE VISIT (OUTPATIENT)
Dept: FAMILY MEDICINE CLINIC | Facility: CLINIC | Age: 62
End: 2022-06-14
Payer: MEDICARE

## 2022-06-14 VITALS
TEMPERATURE: 98 F | OXYGEN SATURATION: 99 % | HEART RATE: 101 BPM | HEIGHT: 72 IN | DIASTOLIC BLOOD PRESSURE: 84 MMHG | SYSTOLIC BLOOD PRESSURE: 136 MMHG | BODY MASS INDEX: 22.62 KG/M2 | WEIGHT: 167 LBS

## 2022-06-14 DIAGNOSIS — S42.222D CLOSED 2-PART DISPLACED FRACTURE OF SURGICAL NECK OF LEFT HUMERUS WITH ROUTINE HEALING, SUBSEQUENT ENCOUNTER: ICD-10-CM

## 2022-06-14 DIAGNOSIS — I26.99 PE (PULMONARY THROMBOEMBOLISM) (HCC): Primary | ICD-10-CM

## 2022-06-14 DIAGNOSIS — L89.153 SACRAL DECUBITUS ULCER, STAGE III (HCC): ICD-10-CM

## 2022-06-14 DIAGNOSIS — Z12.11 SCREEN FOR COLON CANCER: ICD-10-CM

## 2022-06-14 PROBLEM — G92.9 TOXIC ENCEPHALOPATHY: Status: RESOLVED | Noted: 2022-04-25 | Resolved: 2022-06-14

## 2022-06-14 PROCEDURE — G0438 PPPS, INITIAL VISIT: HCPCS | Performed by: INTERNAL MEDICINE

## 2022-06-14 PROCEDURE — 99214 OFFICE O/P EST MOD 30 MIN: CPT | Performed by: INTERNAL MEDICINE

## 2022-06-14 NOTE — PROGRESS NOTES
Assessment/Plan:    PE (pulmonary thromboembolism) ([facility] [address] )  Noted  No evidence of recurrence  Sacral decubitus ulcer, stage III ([facility] [address] )  The area looks clean  He needs to avoid sitting on this ulcer as much as possible  Continue management with home health nursing  Closed 2-part displaced fracture of surgical neck of left humerus  His range of motion is extremely limited but he did declined surgical repair  I did offer to send him back to orthopedics but he wants to hold off on that for now  Chief Complaint     Medicare Wellness Visit          Patient ID: Rashida Monzon is a 63 y o  male who returns for routine follow up  He has a sacral ulcer that is being managed by home health nursing  He doesn't have good range of motion in his left shoulder after he fractured the left humerus in April  His left shoulder pain has improved but he is significantly limited in range of motion  He has a history of pulmonary embolism but no recurrence  Objective:    /84   Pulse 101   Temp 98 °F (36 7 °C)   Ht 6' (1 829 m)   Wt 75 8 kg (167 lb)   SpO2 99%   BMI 22 65 kg/m²     Wt Readings from Last 3 Encounters:   06/14/22 75 8 kg (167 lb)   06/01/22 76 5 kg (168 lb 9 6 oz)   05/26/22 74 4 kg (164 lb)         Physical Exam    General:  Well-developed, well-nourished, in no acute distress  Cardiac:  Regular rate and rhythm, no murmur, gallop, or rub  There is no JVD or HJR  Lungs:  Clear to auscultation and percussion  Abdomen:  Soft, nontender, normoactive bowel sounds, no palpable masses, no hepatosplenomegaly  Extremities:  No clubbing, cyanosis, or edema  Musculoskeletal:  He could only abduct the left shoulder about 20°  He really did have much discomfort with this abduction  Skin:  He has a 2 cm stage III decubitus ulcer in the right sacral region

## 2022-06-14 NOTE — ASSESSMENT & PLAN NOTE
The area looks clean  He needs to avoid sitting on this ulcer as much as possible  Continue management with home health nursing

## 2022-06-14 NOTE — ASSESSMENT & PLAN NOTE
His range of motion is extremely limited but he did declined surgical repair  I did offer to send him back to orthopedics but he wants to hold off on that for now

## 2022-06-14 NOTE — PROGRESS NOTES
Gloria Dueñas is here for his Subsequent Wellness visit  Last Medicare Wellness visit information reviewed, patient interviewed and updates made to the record today  Health Risk Assessment:   Patient rates overall health as good  Patient feels that their physical health rating is much better  Patient is satisfied with their life  Eyesight was rated as same  Hearing was rated as same  Patient feels that their emotional and mental health rating is same  Patients states they are never, rarely angry  Patient states they are sometimes unusually tired/fatigued  Pain experienced in the last 7 days has been some  Patient's pain rating has been 3/10  Patient states that he has experienced weight loss or gain in last 6 months  Depression Screening:   PHQ-2 Score: 1      Fall Risk Screening: In the past year, patient has experienced: history of falling in past year    Number of falls: 2 or more  Injured during fall?: Yes    Feels unsteady when standing or walking?: No    Worried about falling?: No      Home Safety:  Patient does not have trouble with stairs inside or outside of their home  Patient has working smoke alarms and has working carbon monoxide detector  Home safety hazards include: none  Has had PT  Has bilateral knee replacements  Nutrition:   Current diet is Regular  Medications:   Patient is currently taking over-the-counter supplements  OTC medications include: see medication list  Patient is able to manage medications  Activities of Daily Living (ADLs)/Instrumental Activities of Daily Living (IADLs):   Walk and transfer into and out of bed and chair?: Yes  Dress and groom yourself?: Yes    Bathe or shower yourself?: Yes    Feed yourself?  Yes  Do your laundry/housekeeping?: Yes  Manage your money, pay your bills and track your expenses?: Yes  Make your own meals?: Yes    Do your own shopping?: Yes    Previous Hospitalizations:   Any hospitalizations or ED visits within the last 12 months?: Yes How many hospitalizations have you had in the last year?: 3-4    Advance Care Planning:   Living will: No    Durable POA for healthcare: No    Advanced directive counseling given: Yes    End of Life Decisions reviewed with patient: Yes    Provider agrees with end of life decisions: Yes      Comments: Roberta Severs chooses comfort care measures in the event of a terminal diagnosis  PREVENTIVE SCREENINGS      Cardiovascular Screening:    General: Screening Not Indicated and History Lipid Disorder      Diabetes Screening:     General: Screening Current      Colorectal Cancer Screening:       Due for: Cologuard      Prostate Cancer Screening:    General: Screening Not Indicated      Abdominal Aortic Aneurysm (AAA) Screening:    Risk factors include: tobacco use        General: Screening Not Indicated      Lung Cancer Screening:     General: Screening Not Indicated    Screening, Brief Intervention, and Referral to Treatment (SBIRT)    Screening  Typical number of drinks in a day: 0  Typical number of drinks in a week: 0  Interpretation: Low risk drinking behavior      Single Item Drug Screening:  How often have you used an illegal drug (including marijuana) or a prescription medication for non-medical reasons in the past year? never    Single Item Drug Screen Score: 0  Interpretation: Negative screen for possible drug use disorder

## 2022-06-17 ENCOUNTER — TELEPHONE (OUTPATIENT)
Dept: FAMILY MEDICINE CLINIC | Facility: CLINIC | Age: 62
End: 2022-06-17

## 2022-06-28 ENCOUNTER — TELEPHONE (OUTPATIENT)
Dept: FAMILY MEDICINE CLINIC | Facility: CLINIC | Age: 62
End: 2022-06-28

## 2022-06-28 NOTE — TELEPHONE ENCOUNTER
Patient's home health called  They are looking for a verbal order to: cleanse wound area  Lightly pack wound with 2x2s and benja pads  Change dressing twice a week  DX stage 3 pressure injury left buttocks  Please let know if okay or if you'd prefer to talk to them directly

## 2022-07-13 ENCOUNTER — DOCUMENTATION (OUTPATIENT)
Dept: FAMILY MEDICINE CLINIC | Facility: CLINIC | Age: 62
End: 2022-07-13

## 2022-07-13 NOTE — PROGRESS NOTES
Received episode summary report from [facility] (full report scanned into chart) - RN notes taht they had called the patient x4 and also his son several times with no connection or return call  They did drive by the patients home and knocked on the door - the patient answered and was verbally yelling at the RN and cursing  He told them to leave that he wanted to be considered discharged

## 2022-09-19 ENCOUNTER — RA CDI HCC (OUTPATIENT)
Dept: OTHER | Facility: HOSPITAL | Age: 62
End: 2022-09-19

## 2022-09-19 NOTE — PROGRESS NOTES
[facility] [address]  coding opportunities       Chart reviewed, no opportunity found: CHART REVIEWED, NO OPPORTUNITY FOUND        Patients Insurance     Medicare Insurance: Medicare

## 2023-06-09 NOTE — CASE MANAGEMENT
Case Management Progress Note    Patient name Justyna Oconnor  Location [address] [id number]/-[id number] MRN [id number]  : 1960 Date 2022       LOS (days): 4  Geometric Mean LOS (GMLOS) (days): 4 20  Days to GMLOS:0 2        OBJECTIVE:        Current admission status: Inpatient  Preferred Pharmacy:   [facility] [address] [address] [address] [address]  Phone: [phone] Fax: [phone]    Primary Care Provider: Cass Brunner, MD    Primary Insurance: MEDICARE  Secondary Insurance: [facility] NOTE:      CM discussing discharge planning with pt and pts son, Chasidy Gaston, who is at bedside  C has been recommended by PT/OT, however, pt lives alone and has no supervision, as his son works full time  Pt with history of memory loss and has been deemed to have impaired range of functioning, due to the above, son strongly encouraging pt to consider STR, after much discussion, pt is agreeable  Son does not have a preference to any STR, would like referrals placed to "local" facilities  As per request, referrals placed to [address]- unable to accept  [facility]- unable to accept  [facility]-unable to dispense Suboxone  [facility]-reviewing  [facility] to [facility]  [facility]-unable to dispense [facility]one  [facility]-unable to accept pt  [facility]- will accept if pt is not on Suboxone       1631 CM having difficulty finding STR/SNF that will accept pt while he is in Suboxone    Cm placing additional referrals placed to:    [facility] To get better and follow your care plan as instructed.

## 2023-07-20 NOTE — ASSESSMENT & PLAN NOTE
Controlled  Continue to monitor   Patient had left humeral fracture diagnosed on 04/04/2022 on an ED visit    Still present on imaging  -Persistent medially displaced previously acute left humeral head and neck fracture  · Orthopedic recommendation appreciated:Patient may not lift more than 1 lb with the left upper extremity  · PT/OT pendulum exercises, active assisted range of motion, below shoulder height left upper extremity  PT OT cleared patient